# Patient Record
Sex: MALE | Race: WHITE | NOT HISPANIC OR LATINO | Employment: OTHER | URBAN - METROPOLITAN AREA
[De-identification: names, ages, dates, MRNs, and addresses within clinical notes are randomized per-mention and may not be internally consistent; named-entity substitution may affect disease eponyms.]

---

## 2019-03-28 ENCOUNTER — TRANSFERRED RECORDS (OUTPATIENT)
Dept: HEALTH INFORMATION MANAGEMENT | Facility: CLINIC | Age: 64
End: 2019-03-28

## 2019-04-16 ENCOUNTER — TRANSFERRED RECORDS (OUTPATIENT)
Dept: HEALTH INFORMATION MANAGEMENT | Facility: CLINIC | Age: 64
End: 2019-04-16

## 2019-04-29 ENCOUNTER — TRANSFERRED RECORDS (OUTPATIENT)
Dept: HEALTH INFORMATION MANAGEMENT | Facility: CLINIC | Age: 64
End: 2019-04-29

## 2019-06-03 ENCOUNTER — MEDICAL CORRESPONDENCE (OUTPATIENT)
Dept: HEALTH INFORMATION MANAGEMENT | Facility: CLINIC | Age: 64
End: 2019-06-03

## 2019-06-03 ENCOUNTER — TRANSFERRED RECORDS (OUTPATIENT)
Dept: HEALTH INFORMATION MANAGEMENT | Facility: CLINIC | Age: 64
End: 2019-06-03

## 2019-06-17 ENCOUNTER — OFFICE VISIT (OUTPATIENT)
Dept: NEUROSURGERY | Facility: CLINIC | Age: 64
End: 2019-06-17
Payer: COMMERCIAL

## 2019-06-17 VITALS
HEART RATE: 76 BPM | SYSTOLIC BLOOD PRESSURE: 159 MMHG | DIASTOLIC BLOOD PRESSURE: 102 MMHG | OXYGEN SATURATION: 96 % | HEIGHT: 71 IN | BODY MASS INDEX: 26.8 KG/M2 | WEIGHT: 191.4 LBS

## 2019-06-17 DIAGNOSIS — Z01.818 PRE-OPERATIVE EXAM: Primary | ICD-10-CM

## 2019-06-17 DIAGNOSIS — D32.0 BENIGN NEOPLASM OF CEREBRAL MENINGES (H): ICD-10-CM

## 2019-06-17 RX ORDER — LATANOPROST 50 UG/ML
1 SOLUTION/ DROPS OPHTHALMIC AT BEDTIME
COMMUNITY
Start: 2018-10-16

## 2019-06-17 RX ORDER — BRINZOLAMIDE 10 MG/ML
1 SUSPENSION/ DROPS OPHTHALMIC EVERY MORNING
COMMUNITY
Start: 2018-10-16

## 2019-06-17 RX ORDER — RAMIPRIL 10 MG/1
10 CAPSULE ORAL
COMMUNITY
Start: 2016-04-14

## 2019-06-17 RX ORDER — TIMOLOL MALEATE 5 MG/ML
1 SOLUTION/ DROPS OPHTHALMIC EVERY MORNING
COMMUNITY
Start: 2018-05-30

## 2019-06-17 ASSESSMENT — ENCOUNTER SYMPTOMS
LOSS OF CONSCIOUSNESS: 0
SMELL DISTURBANCE: 0
STIFFNESS: 0
HEADACHES: 1
COUGH DISTURBING SLEEP: 0
SORE THROAT: 0
BACK PAIN: 0
DOUBLE VISION: 0
DYSPNEA ON EXERTION: 0
TROUBLE SWALLOWING: 0
WHEEZING: 0
NECK PAIN: 0
SHORTNESS OF BREATH: 1
MUSCLE CRAMPS: 0
NECK MASS: 0
EYE PAIN: 1
MUSCLE WEAKNESS: 0
JOINT SWELLING: 0
INSOMNIA: 0
EYE WATERING: 1
COUGH: 1
MYALGIAS: 0
EYE REDNESS: 0
SPUTUM PRODUCTION: 1
HEMOPTYSIS: 0
SINUS CONGESTION: 1
SINUS PAIN: 1
DEPRESSION: 1
EYE IRRITATION: 1
POSTURAL DYSPNEA: 1
HOARSE VOICE: 0
ARTHRALGIAS: 1
TASTE DISTURBANCE: 0
DISTURBANCES IN COORDINATION: 0
SNORES LOUDLY: 1
NERVOUS/ANXIOUS: 1
MEMORY LOSS: 0
DIZZINESS: 0

## 2019-06-17 ASSESSMENT — MIFFLIN-ST. JEOR: SCORE: 1675.31

## 2019-06-17 ASSESSMENT — PAIN SCALES - GENERAL: PAINLEVEL: MILD PAIN (3)

## 2019-06-17 NOTE — LETTER
6/17/2019       RE: Vinh Mccarty  [address]  [facility] [address]     Dear Colleague,    Thank you for referring your patient, Vinh Mccarty, to the [facility] NEUROSURGERY at [facility]. Please see a copy of my visit note below.    See dictated note.    Again, thank you for allowing me to participate in the care of your patient.      Sincerely,    Matthew Fox MD

## 2019-06-17 NOTE — LETTER
6/17/2019      RE: Vinh Mccarty  [address]  [place] [address]       See dictated note.    Matthew Fox MD

## 2019-06-17 NOTE — LETTER
6/17/2019      RE: Vinh Mccarty  [address]  [place] [address]       See dictated note.    Matthew Fox MD

## 2019-06-17 NOTE — LETTER
6/17/2019       RE: Vinh Mccarty  [address]  [facility] [address]     Dear Colleague,    Thank you for referring your patient, Vinh Mccarty, to the [facility] NEUROSURGERY at [facility]. Please see a copy of my visit note below.    See dictated note.    Again, thank you for allowing me to participate in the care of your patient.      Sincerely,    Matthew Fox MD

## 2019-06-17 NOTE — LETTER
6/17/2019       RE: Vinh Mccarty  [address]  [facility] [address]     Dear Colleague,    Thank you for referring your patient, Vinh Mccarty, to the [facility] NEUROSURGERY at [facility]. Please see a copy of my visit note below.    See dictated note.    Again, thank you for allowing me to participate in the care of your patient.      Sincerely,    Matthew Fox MD

## 2019-06-17 NOTE — LETTER
6/17/2019       RE: Vinh Mccarty  [address]  [facility] [address]     Dear Colleague,    Thank you for referring your patient, Vinh Mccarty, to the [facility] NEUROSURGERY at [facility]. Please see a copy of my visit note below.    See dictated note.    Again, thank you for allowing me to participate in the care of your patient.      Sincerely,    Matthew Fox MD

## 2019-06-17 NOTE — LETTER
6/17/2019       RE: Vinh Mccarty  [address]  [facility] [address]     Dear Colleague,    Thank you for referring your patient, Vinh Mccarty, to the [facility] NEUROSURGERY at [facility]. Please see a copy of my visit note below.    See dictated note.    Again, thank you for allowing me to participate in the care of your patient.      Sincerely,    Matthew Fox MD

## 2019-06-17 NOTE — LETTER
6/17/2019       RE: Vinh Mccarty  [address]  [facility] [address]     Dear Colleague,    Thank you for referring your patient, Vinh Mccarty, to the [facility] NEUROSURGERY at [facility]. Please see a copy of my visit note below.    See dictated note.    Again, thank you for allowing me to participate in the care of your patient.      Sincerely,    Matthew Fox MD

## 2019-06-17 NOTE — LETTER
6/17/2019      RE: Vinh Mccarty  [address]  [place] [address]       See dictated note.    Matthew Fox MD

## 2019-06-17 NOTE — LETTER
6/17/2019       RE: Vinh Mccarty  [address]  [facility] [address]     Dear Colleague,    Thank you for referring your patient, Vinh Mccarty, to the [facility] NEUROSURGERY at [facility]. Please see a copy of my visit note below.    See dictated note.    Again, thank you for allowing me to participate in the care of your patient.      Sincerely,    Matthew Fox MD

## 2019-06-17 NOTE — LETTER
6/17/2019       RE: Vinh Mccarty  [address]  [facility] [address]     Dear Colleague,    Thank you for referring your patient, Vinh Mccarty, to the [facility] NEUROSURGERY at [facility]. Please see a copy of my visit note below.    See dictated note.    Again, thank you for allowing me to participate in the care of your patient.      Sincerely,    Matthew Fox MD

## 2019-06-17 NOTE — LETTER
6/17/2019       RE: Vinh Mccarty  [address]  [facility] [address]     Dear Colleague,    Thank you for referring your patient, Vinh Mccarty, to the [facility] NEUROSURGERY at [facility]. Please see a copy of my visit note below.    See dictated note.    Again, thank you for allowing me to participate in the care of your patient.      Sincerely,    Matthew Fox MD

## 2019-06-17 NOTE — NURSING NOTE
Chief Complaint   Patient presents with     New Patient     [facility] NEW MENINGIOMA       Agustina Leal, EMT

## 2019-06-17 NOTE — LETTER
6/17/2019       RE: Vinh Mccarty  [address]  [facility] [address]     Dear Colleague,    Thank you for referring your patient, Vinh Mccarty, to the [facility] NEUROSURGERY at [facility]. Please see a copy of my visit note below.    See dictated note.    Again, thank you for allowing me to participate in the care of your patient.      Sincerely,    Matthew Fox MD

## 2019-06-17 NOTE — LETTER
6/17/2019       RE: Vinh Mccarty  [address]  [facility] [address]     Dear Colleague,    Thank you for referring your patient, Vinh Mccarty, to the [facility] NEUROSURGERY at [facility]. Please see a copy of my visit note below.    See dictated note.    Again, thank you for allowing me to participate in the care of your patient.      Sincerely,    Matthew Fox MD

## 2019-06-17 NOTE — LETTER
6/17/2019       RE: Vinh Mccarty  [address]  [facility] [address]     Dear Colleague,    Thank you for referring your patient, Vinh Mccarty, to the [facility] NEUROSURGERY at [facility]. Please see a copy of my visit note below.    See dictated note.    Again, thank you for allowing me to participate in the care of your patient.      Sincerely,    Matthew Fox MD

## 2019-06-17 NOTE — LETTER
6/17/2019      RE: Vinh Mccarty  [address]  [place] [address]       See dictated note.    Matthew Fox MD

## 2019-06-17 NOTE — LETTER
6/17/2019       RE: Vinh Mccarty  [address]  [facility] [address]     Dear Colleague,    Thank you for referring your patient, Vinh Mccarty, to the [facility] NEUROSURGERY at [facility]. Please see a copy of my visit note below.    See dictated note.    Again, thank you for allowing me to participate in the care of your patient.      Sincerely,    Matthew Fox MD

## 2019-06-17 NOTE — LETTER
6/17/2019       RE: Vinh Mccarty  [address]  [facility] [address]     Dear Colleague,    Thank you for referring your patient, Vinh Mccarty, to the [facility] NEUROSURGERY at [facility]. Please see a copy of my visit note below.    See dictated note.    Again, thank you for allowing me to participate in the care of your patient.      Sincerely,    Matthew Fox MD

## 2019-06-17 NOTE — LETTER
6/17/2019      RE: Vinh Mccarty  [address]  [place] [address]       See dictated note.    Matthew Fox MD

## 2019-06-17 NOTE — LETTER
6/17/2019      RE: Vinh Mccarty  [address]  [place] [address]       See dictated note.    Matthew Fox MD

## 2019-06-17 NOTE — LETTER
6/17/2019      RE: Vinh Mccarty  [address]  [place] [address]       See dictated note.    Matthew Fox MD

## 2019-06-17 NOTE — LETTER
6/17/2019       RE: Vinh Mccarty  [address]  [facility] [address]     Dear Colleague,    Thank you for referring your patient, Vinh Mccarty, to the [facility] NEUROSURGERY at [facility]. Please see a copy of my visit note below.    See dictated note.    Again, thank you for allowing me to participate in the care of your patient.      Sincerely,    Matthew Fox MD

## 2019-06-17 NOTE — LETTER
6/17/2019       RE: Vinh Mccarty  [address]  [facility] [address]     Dear Colleague,    Thank you for referring your patient, Vinh Mccarty, to the [facility] NEUROSURGERY at [facility]. Please see a copy of my visit note below.    See dictated note.    Again, thank you for allowing me to participate in the care of your patient.      Sincerely,    Matthew Fox MD

## 2019-06-17 NOTE — LETTER
6/17/2019       RE: Vinh Mccarty  [address]  [facility] [address]     Dear Colleague,    Thank you for referring your patient, Vinh Mccarty, to the [facility] NEUROSURGERY at [facility]. Please see a copy of my visit note below.    See dictated note.    Again, thank you for allowing me to participate in the care of your patient.      Sincerely,    Matthew Fox MD

## 2019-06-17 NOTE — LETTER
6/17/2019       RE: Vinh Mccarty  [address]  [facility] [address]     Dear Colleague,    Thank you for referring your patient, Vinh Mccarty, to the [facility] NEUROSURGERY at [facility]. Please see a copy of my visit note below.    See dictated note.    Again, thank you for allowing me to participate in the care of your patient.      Sincerely,    Matthew Fox MD

## 2019-06-17 NOTE — LETTER
6/17/2019       RE: Vinh Mccarty  [address]  [facility] [address]     Dear Colleague,    Thank you for referring your patient, Vinh Mccarty, to the [facility] NEUROSURGERY at [facility]. Please see a copy of my visit note below.    See dictated note.    Again, thank you for allowing me to participate in the care of your patient.      Sincerely,    Matthew Fox MD

## 2019-06-17 NOTE — LETTER
6/17/2019      RE: Vinh Mccarty  [address]  [place] [address]       See dictated note.    Matthew Fox MD

## 2019-06-19 DIAGNOSIS — D32.0 BENIGN NEOPLASM OF CEREBRAL MENINGES (H): Primary | ICD-10-CM

## 2019-07-16 NOTE — TELEPHONE ENCOUNTER
Action 7/16/2019 2:28pm  PP   Action Taken Faxed request for EKG to [facility].        FUTURE VISIT INFORMATION      SURGERY INFORMATION:    Date: 7/25/2019    Location: [facility] OR    Surgeon:  Dr. Matthew Fox    Anesthesia Type:  General    RECORDS REQUESTED FROM:       Primary Care Provider:  Shanita Gan NP, [facility] [place]    Pertinent Medical History:  Hypertension, ERNESTINA    [facility]    Most recent Sleep Study:  6/10/2012 (Scan)

## 2019-07-17 ENCOUNTER — PATIENT OUTREACH (OUTPATIENT)
Dept: NEUROSURGERY | Facility: CLINIC | Age: 64
End: 2019-07-17

## 2019-07-17 NOTE — PROGRESS NOTES
Writer called pt to remind him not to take his ASA tomorrow morning and not to take it anymore prior to surgery.  Pt expressed understanding of the information given.

## 2019-07-18 ENCOUNTER — PATIENT OUTREACH (OUTPATIENT)
Dept: NEUROSURGERY | Facility: CLINIC | Age: 64
End: 2019-07-18

## 2019-07-18 NOTE — PROGRESS NOTES
Pre-op Teaching    Procedure:  (s) - multi select Right frontotemporal craniotomy and resection of tumor       Planned Surgery Date:7/24  Surgeon:Dr. Fox                Discussed pre-op routine and requirements to include:  surgical procedure, post-op recovery and expectations, need for H&P, NPO prior to OR, pre-op antibacterial showers, pain control and importance of follow-up visits.  Surgery scheduling will coordinate OR time/date and update patient as appropriate.  3C will call to re-inforce instructions 24-48 hours prior to surgery.       Ample time was provided for patient questions and in-depth discussion of topics of heightened interest.  Reviewed medication list and provided instructions regarding what medications to stop prior to surgery: ASA 81mg.   Anti-bacterial soap solution for pre-op showers will be provided at PAC visit as well as specific instructions for use. Approximately 20 minutes spent with patient/family discussing and reviewing.      Patient provided triage contact number for questions or concerns that may arise prior to surgery. Pre-op folder with specific written instructions given to patient for review.

## 2019-07-24 ENCOUNTER — PRE VISIT (OUTPATIENT)
Dept: SURGERY | Facility: CLINIC | Age: 64
End: 2019-07-24

## 2019-07-24 ENCOUNTER — OFFICE VISIT (OUTPATIENT)
Dept: SURGERY | Facility: CLINIC | Age: 64
End: 2019-07-24
Payer: COMMERCIAL

## 2019-07-24 ENCOUNTER — ANESTHESIA EVENT (OUTPATIENT)
Dept: SURGERY | Facility: CLINIC | Age: 64
DRG: 027 | End: 2019-07-24
Payer: MEDICARE

## 2019-07-24 VITALS
HEART RATE: 74 BPM | BODY MASS INDEX: 25.48 KG/M2 | TEMPERATURE: 98 F | DIASTOLIC BLOOD PRESSURE: 86 MMHG | WEIGHT: 182 LBS | RESPIRATION RATE: 16 BRPM | HEIGHT: 71 IN | SYSTOLIC BLOOD PRESSURE: 142 MMHG | OXYGEN SATURATION: 96 %

## 2019-07-24 DIAGNOSIS — D32.0 BENIGN NEOPLASM OF CEREBRAL MENINGES (H): ICD-10-CM

## 2019-07-24 DIAGNOSIS — Z01.818 PREOP EXAMINATION: Primary | ICD-10-CM

## 2019-07-24 DIAGNOSIS — Z01.818 PREOP EXAMINATION: ICD-10-CM

## 2019-07-24 LAB
ABO + RH BLD: NORMAL
ABO + RH BLD: NORMAL
ANION GAP SERPL CALCULATED.3IONS-SCNC: 4 MMOL/L (ref 3–14)
BLD GP AB SCN SERPL QL: NORMAL
BLOOD BANK CMNT PATIENT-IMP: NORMAL
BUN SERPL-MCNC: 12 MG/DL (ref 7–30)
CALCIUM SERPL-MCNC: 8.8 MG/DL (ref 8.5–10.1)
CHLORIDE SERPL-SCNC: 111 MMOL/L (ref 94–109)
CO2 SERPL-SCNC: 26 MMOL/L (ref 20–32)
CREAT SERPL-MCNC: 0.98 MG/DL (ref 0.66–1.25)
ERYTHROCYTE [DISTWIDTH] IN BLOOD BY AUTOMATED COUNT: 12 % (ref 10–15)
GFR SERPL CREATININE-BSD FRML MDRD: 81 ML/MIN/{1.73_M2}
GLUCOSE SERPL-MCNC: 101 MG/DL (ref 70–99)
HCT VFR BLD AUTO: 46 % (ref 40–53)
HGB BLD-MCNC: 15.1 G/DL (ref 13.3–17.7)
MCH RBC QN AUTO: 30.8 PG (ref 26.5–33)
MCHC RBC AUTO-ENTMCNC: 32.8 G/DL (ref 31.5–36.5)
MCV RBC AUTO: 94 FL (ref 78–100)
PLATELET # BLD AUTO: 185 10E9/L (ref 150–450)
POTASSIUM SERPL-SCNC: 3.6 MMOL/L (ref 3.4–5.3)
RBC # BLD AUTO: 4.9 10E12/L (ref 4.4–5.9)
SODIUM SERPL-SCNC: 142 MMOL/L (ref 133–144)
SPECIMEN EXP DATE BLD: NORMAL
WBC # BLD AUTO: 7.3 10E9/L (ref 4–11)

## 2019-07-24 ASSESSMENT — MIFFLIN-ST. JEOR: SCORE: 1632.68

## 2019-07-24 ASSESSMENT — PAIN SCALES - GENERAL: PAINLEVEL: MILD PAIN (2)

## 2019-07-24 NOTE — ANESTHESIA PREPROCEDURE EVALUATION
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 Pt has had prior anesthetic. Type: MAC, General and Regional           ROS/MED HX    ENT/Pulmonary: Comment: \"borderline\" ERNESTINA per pt    (+)sleep apnea, allergic rhinitis, doesn't use CPAP , . .    Neurologic: Comment: Benign neoplasm of cerebral meninges, affecting optic nerve, right visual changes      Cardiovascular:     (+) hypertension-range: on ramipril, ---. Taking blood thinners Pt has received instructions: Instructions Given to patient: stopped ASA 19. . . :. . No previous cardiac testing       METS/Exercise Tolerance: Comment: Walks 1-2 miles 5 days/week >4 METS   Hematologic:  - neg hematologic  ROS       Musculoskeletal: Comment: knees  (+) arthritis,  -       GI/Hepatic: Comment: Was taking famotidine for GERD, symptoms resolved w/ wt loss        Renal/Genitourinary:  - ROS Renal section negative       Endo:  - neg endo ROS       Psychiatric:  - neg psychiatric ROS       Infectious Disease:  - neg infectious disease ROS       Malignancy:      - no malignancy   Other:    (+) No chance of pregnancy C-spine cleared: N/A, no H/O Chronic Pain,                       PHYSICAL EXAM:   Mental Status/Neuro: A/A/O; Age Appropriate   Airway: Facies: Feasible  Mallampati: Not Assessed  Mouth/Opening: Not " "Assessed  TM distance: Not Assessed  Neck ROM: Not Assessed   Respiratory: Auscultation: CTAB     Resp. Rate: Normal     Resp. Effort: Normal      CV: Rhythm: Regular  Rate: Age appropriate  Heart: Normal Sounds   Comments:      Dental: Details                Lab Results   Component Value Date     05/05/2014    POTASSIUM 4.2 05/05/2014    CHLORIDE 105 05/05/2014    CO2 27 05/05/2014    BUN 11 05/05/2014    CR 0.93 05/05/2014    GLC 90 05/05/2014    BRAD 9.4 05/05/2014    ALT 34 07/26/2013    TSH 0.64 05/05/2014       Preop Vitals  BP Readings from Last 3 Encounters:   07/24/19 142/86   06/17/19 (!) 159/102   05/05/14 116/80    Pulse Readings from Last 3 Encounters:   07/24/19 74   06/17/19 76   05/05/14 68      Resp Readings from Last 3 Encounters:   07/24/19 16   05/05/14 12   04/08/14 12    SpO2 Readings from Last 3 Encounters:   07/24/19 96%   06/17/19 96%      Temp Readings from Last 1 Encounters:   07/24/19 98  F (36.7  C) (Oral)    Ht Readings from Last 1 Encounters:   07/24/19 1.803 m (5' 11\")      Wt Readings from Last 1 Encounters:   07/24/19 82.6 kg (182 lb)    Estimated body mass index is 25.38 kg/m  as calculated from the following:    Height as of this encounter: 1.803 m (5' 11\").    Weight as of this encounter: 82.6 kg (182 lb).     LDA:            Assessment:   ASA SCORE: 2    H&P: History and physical reviewed and following examination; no interval change.   Smoking Status:  Non-Smoker/Unknown   NPO Status: NPO Appropriate     Plan:   Anes. Type:  General   Pre-Medication: Midazolam   Induction:  IV (Standard)   Airway: ETT; Oral   Access/Monitoring: PIV; 2nd PIV; A-Line   Maintenance: Balanced     Postop Plan:   Postop Pain: Opioids  Postop Sedation/Airway: Not planned  Disposition: Inpatient/Admit     PONV Management:   Adult Risk Factors:, Non-Smoker, Postop Opioids   Prevention: Ondansetron, Dexamethasone     CONSENT: Direct conversation   Plan and risks discussed with: Patient; Spouse "   Blood Products: Consented (ALL Blood Products)                  PAC Discussion and Assessment    ASA Classification: 2  Case is suitable for: [place]  Anesthetic techniques and relevant risks discussed: GA  Invasive monitoring and risk discussed: No  Types:   Possibility and Risk of blood transfusion discussed: No  NPO instructions given:   Additional anesthetic preparation and risks discussed:   Needs early admission to pre-op area:   Other:     PAC Resident/NP Anesthesia Assessment:  Vinh Mccarty is a 64 year old male scheduled to undergo Right Frontotemporal Craniotomy And Resection Of Tumor with Matthew Fox MD on 7/25/19 at [facility] for treatment of Benign neoplasm of cerebral meninges.     He has the following specific operative considerations:      - Pt has had prior anesthetic. Type: MAC, General and Regional - no complications  - Anesthesia considerations:  Refer to PAC assessment in anesthesia records  - Risk of PONV score = 1.  If > 2, anti-emetic intervention recommended.    CARDIAC: METS >4, Walks 1-2 miles 5 days/week      - RCRI : Intermediate risk surgery,.  0.4% risk of major adverse cardiac event.    PULMONARY:    - ERNESTINA, no CPAP    - Never smoked, no asthma    GI: Hx GERD, resolved after wt loss    ENDO: BMI 26, no DM    HEME: VTE risk: 1.8%  - On ASA 81 mg, last dose on 7/17/19    ORTHO: full neck ROM, no TMJ       Patient was discussed with Dr Scales. Patient is optimized and is acceptable candidate for the proposed procedure, provided labs today are within acceptable range. No further diagnostic evaluation is needed.        Reviewed and Signed by PAC Mid-Level Provider/Resident  Mid-Level Provider/Resident: Thuy Kruse PA-C  Date: 7/24/19  Time: 1632    Attending Anesthesiologist Anesthesia Assessment:        Anesthesiologist:   Date:   Time:   Pass/Fail:   Disposition:     PAC Pharmacist Assessment:        Pharmacist:   Date:    Time:        Thuy Kruse PA-C

## 2019-07-24 NOTE — H&P
Pre-Operative H & P     CC:  Preoperative exam to assess for increased cardiopulmonary risk while undergoing surgery and anesthesia.    Date of Encounter: 7/24/2019  Primary Care Physician:  Shanita Gan  Reason for Visit: Benign neoplasm of cerebral meninges (H) [D32.0]    Vinh Mccarty is a 65 y/o male who presents for pre-operative H&P in preparation for Right Frontotemporal Craniotomy And Resection Of Tumor with Matthew Fox MD on 7/25/19 at [facility] for treatment of Benign neoplasm of cerebral meninges.    Mr. Mccarty was evaluated at [facility] for right sided visual loss, mainly in the periphery. MRI of his brain showed a small meningioma that is abutting the right optic nerve and internal carotid artery. It is likely that this is the cause for his visual decline. He now presents for the above procedure.    PMH is also significant for HTN (stable on Ramipril), HLD (taking crestor), ERNESTINA w/o CPAP use, GERD (resolved following wt loss). He is anticoagulated with ASA 81 mg (last dose 7/17/19 for upcoming surgery).    History was obtained from patient & chart review.     Past Medical History  Past Medical History:   Diagnosis Date     HTN (hypertension) 10/10/2011     hyperlipidemia 10/10/2011       Past Surgical History  Past Surgical History:   Procedure Laterality Date     ARTHROSCOPY KNEE  2011 LT; torn menicus knee     bakers cyst / removed  2011 LT     COLONOSCOPY  11/2011     ELBOW SURGERY      Removal of pin     GLAUCOMA SURGERY       surgically repaired/pins  1979    rt elbow fx       Hx of Blood transfusions/reactions: no     Hx of abnormal bleeding or anti-platelet use: on ASA 81 mg    Menstrual history: No LMP for male patient.: N/A    Steroid use in the last year: no    Personal or FH with difficulty with Anesthesia:  no    Prior to Admission Medications  Current Outpatient Medications   Medication Sig Dispense Refill      aspirin 81 MG tablet Take 1 tablet by mouth daily.       brinzolamide (AZOPT) 1 % ophthalmic suspension 1 drop       cefPROZIL (CEFZIL) 500 MG tablet Take 1 tablet (500 mg) by mouth 2 times daily (Patient not taking: Reported on 6/17/2019) 20 tablet 0     CRESTOR 10 MG tablet TAKE 1 TABLET (10 MG) BY MOUTH DAILY 90 tablet 0     famotidine (ACID REDUCER) 10 MG tablet Take 10 mg by mouth daily.       latanoprost (XALATAN) 0.005 % ophthalmic solution Inject 1 drop into the eye       ramipril (ALTACE) 10 MG capsule Take 10 mg by mouth       ramipril (ALTACE) 5 MG capsule TAKE 2 CAPSULES BY MOUTH DAILY. (Patient not taking: Reported on 6/17/2019) 180 capsule 0     sodium chloride (OCEAN NASAL SPRAY) 0.65 % nasal spray Spray 1 spray in nostril daily as needed.       timolol maleate (TIMOPTIC) 0.5 % ophthalmic solution 1 drop         Allergies  Allergies   Allergen Reactions     Cats      congestion     Horse Allergy      Per allergy testing     Penicillins        Social History  Social History     Socioeconomic History     Marital status:      Spouse name: Not on file     Number of children: Not on file     Years of education: Not on file     Highest education level: Not on file   Occupational History     Not on file   Social Needs     Financial resource strain: Not on file     Food insecurity:     Worry: Not on file     Inability: Not on file     Transportation needs:     Medical: Not on file     Non-medical: Not on file   Tobacco Use     Smoking status: Never Smoker     Smokeless tobacco: Never Used   Substance and Sexual Activity     Alcohol use: Yes     Comment: socially     Drug use: No     Sexual activity: Not on file   Lifestyle     Physical activity:     Days per week: Not on file     Minutes per session: Not on file     Stress: Not on file   Relationships     Social connections:     Talks on phone: Not on file     Gets together: Not on file     Attends Latter day service: Not on file     Active member of  "club or organization: Not on file     Attends meetings of clubs or organizations: Not on file     Relationship status: Not on file     Intimate partner violence:     Fear of current or ex partner: Not on file     Emotionally abused: Not on file     Physically abused: Not on file     Forced sexual activity: Not on file   Other Topics Concern      Service Not Asked     Blood Transfusions Yes     Caffeine Concern Yes     Comment: coffee > 32 oz     Occupational Exposure Not Asked     Hobby Hazards Not Asked     Sleep Concern Not Asked     Stress Concern Not Asked     Weight Concern Not Asked     Special Diet Not Asked     Back Care Not Asked     Exercise Yes     Comment: walking     Bike Helmet Not Asked     Seat Belt Not Asked     Self-Exams Not Asked     Parent/sibling w/ CABG, MI or angioplasty before 65F 55M? No   Social History Narrative     Not on file       Family History  Family History   Problem Relation Age of Onset     Heart Disease Father         CHF     Diabetes Father      Other - See Comments Father 62        emphysema     Respiratory Father      Cerebrovascular Disease Mother 94     Other - See Comments Brother         emphysema     Asthma No family hx of      Anesthesia Reaction No family hx of      Deep Vein Thrombosis (DVT) No family hx of        ROS/MED HX  The complete review of systems is negative other than noted in the HPI or here.  Patient denies recent illness, fever and respiratory infection during past month.  Pt denies steroid use during past year.    ENT/Pulmonary: Comment: \"borderline\" ERNESTINA per pt    (+)sleep apnea, allergic rhinitis, doesn't use CPAP , . .    Neurologic: Comment: Benign neoplasm of cerebral meninges, affecting optic nerve, right visual changes      Cardiovascular:     (+) hypertension-range: on ramipril, ---. Taking blood thinners Pt has received instructions: Instructions Given to patient: stopped ASA 7/17/19. . . :. . No previous cardiac testing     " "  METS/Exercise Tolerance: Comment: Walks 1-2 miles 5 days/week >4 METS   Hematologic:  - neg hematologic  ROS       Musculoskeletal: Comment: knees  (+) arthritis,  -       GI/Hepatic: Comment: Was taking famotidine for GERD, symptoms resolved w/ wt loss        Renal/Genitourinary:  - ROS Renal section negative       Endo:  - neg endo ROS       Psychiatric:  - neg psychiatric ROS       Infectious Disease:  - neg infectious disease ROS       Malignancy:      - no malignancy   Other:    (+) No chance of pregnancy C-spine cleared: N/A, no H/O Chronic Pain,             PHYSICAL EXAM:   Mental Status/Neuro: A/A/O; Age Appropriate   Airway: Facies: Feasible  Mallampati: I  Mouth/Opening: Full  TM distance: > 6 cm  Neck ROM: Full   Respiratory: Auscultation: CTAB     Resp. Rate: Normal     Resp. Effort: Normal      CV: Rhythm: Regular  Rate: Age appropriate  Heart: Normal Sounds   Comments:      Dental: Normal                Preop Vitals  BP Readings from Last 3 Encounters:   07/24/19 142/86   06/17/19 (!) 159/102   05/05/14 116/80    Pulse Readings from Last 3 Encounters:   07/24/19 74   06/17/19 76   05/05/14 68      Resp Readings from Last 3 Encounters:   07/24/19 16   05/05/14 12   04/08/14 12    SpO2 Readings from Last 3 Encounters:   07/24/19 96%   06/17/19 96%      Temp Readings from Last 1 Encounters:   07/24/19 98  F (36.7  C) (Oral)    Ht Readings from Last 1 Encounters:   07/24/19 1.803 m (5' 11\")      Wt Readings from Last 1 Encounters:   07/24/19 82.6 kg (182 lb)    Estimated body mass index is 25.38 kg/m  as calculated from the following:    Height as of this encounter: 1.803 m (5' 11\").    Weight as of this encounter: 82.6 kg (182 lb).       Temp: 98  F (36.7  C) Temp src: Oral BP: 142/86 Pulse: 74   Resp: 16 SpO2: 96 %         182 lbs 0 oz  5' 11\"   Body mass index is 25.38 kg/m .    Physical Exam  Constitutional: Awake, alert, cooperative, no apparent distress, and appears stated age.  Eyes: Pupils equal, " round and reactive to light, extra ocular muscles intact, sclera clear, conjunctiva normal.  HENT: Normocephalic, oral pharynx with moist mucus membranes, good dentition. No goiter appreciated. No removable dental hardware.  Respiratory: Clear to auscultation bilaterally, no crackles or wheezing. No SOB when supine.  Cardiovascular: Regular rate and rhythm, normal S1 and S2, and no murmur noted.  Carotids +2, no bruits. No edema. Palpable pulses to radial, DP and PT arteries.   GI: Normal bowel sounds, soft, non-distended, non-tender, no masses palpated, no hepatomegaly.    Lymph/Hematologic: No cervical lymphadenopathy and no supraclavicular lymphadenopathy.  Genitourinary:  deferred  Skin: Warm and dry.  No rashes at anticipated surgical site.   Musculoskeletal: full ROM of neck. There is no redness, warmth, or swelling of the joints. Gross motor strength is normal.    Neurologic: Awake, alert, oriented to name, place and time. Cranial nerves II-XII are grossly intact. Gait is normal. Ambulates from chair to exam table, seats self, lies supine and sits back up w/o assistance.  Neuropsychiatric: Calm, cooperative. Normal affect. Pleasant. Answers questions appropriately, follows commands w/o difficulty.    PRIOR LABS/DIAGNOSTIC STUDIES:  All labs and imaging personally reviewed    MR BRAIN W WO CONTRAST 4/19/19 ([facility]):  IMPRESSION: Two masses at the skull base. Meningioma is most likely, but certainly a neuroma or schwannoma is in the differential. Consider neurosurgery consultation.    Labs today: (personally reviewed)  CBC, BMP, T&S    Outside records reviewed from: Care Everywhere    ASSESSMENT and PLAN  Vinh Mccarty is a 64 year old male scheduled to undergo Right Frontotemporal Craniotomy And Resection Of Tumor with Matthew Fox MD on 7/25/19 at [facility] for treatment of Benign neoplasm of cerebral meninges.     He has the following  specific operative considerations:      - Pt has had prior anesthetic. Type: MAC, General and Regional - no complications  - Anesthesia considerations:  Refer to PAC assessment in anesthesia records  - Risk of PONV score = 1.  If > 2, anti-emetic intervention recommended.    CARDIAC: METS >4, Walks 1-2 miles 5 days/week      - RCRI : Intermediate risk surgery,.  0.4% risk of major adverse cardiac event.    PULMONARY:    - ERNESTINA, no CPAP    - Never smoked, no asthma    GI: Hx GERD, resolved after wt loss    ENDO: BMI 26, no DM    HEME: VTE risk: 1.8%  - On ASA 81 mg, last dose on 7/17/19    ORTHO: full neck ROM, no TMJ       Patient was discussed with Dr Scales. Patient is optimized and is acceptable candidate for the proposed procedure, provided labs today are within acceptable range. No further diagnostic evaluation is needed.    Arrival time, NPO, shower and medication instructions provided by nursing staff today.  Preparing For Your Surgery handout given.    Thuy Kruse PA-C  Preoperative Assessment Center  [facility]  Clinic and Surgery Center  Phone: [phone]  Fax: [phone]

## 2019-07-24 NOTE — PATIENT INSTRUCTIONS
Preparing for Your Surgery      Name:  Vinh Mccarty   MRN:  [id number]   :  1955   Today's Date:  2019     Arriving for surgery:  Surgery date:  19  Arrival time:  05:30 am  Please come to:       [facility] [address]  [address] [place]  [address]    - ? parking is available in front of the hospital      -    Please proceed to [address] on the 3rd floor. [phone]?     - ?If you are in need of directions, wheelchair or escort please stop at the Information Desk in the lobby.  Inform the information person that you are here for surgery; a wheelchair and escort to [address] will be provided.?     What can I eat or drink?  -  You may have solid food or milk products until 8 hours prior to your surgery.  -  You may have water, apple juice or 7up/Sprite until 2 hours prior to your surgery.    Which medicines can I take?  Stop Aspirin, vitamins and supplements one week prior to surgery.  Hold Ibuprofen for 24 hours and/or Naproxen for 48 hours prior to surgery.   Please do not take altace (ramipril) the morning of surgery.  -  Please take these medications the day of surgery:  Tylenol if needed; take all other scheduled medications normally taken in the morning.    How do I prepare myself?  -  Take two showers: one the night before surgery; and one the morning of surgery.         Use Scrubcare or Hibiclens to wash from neck down.  You may use your own     shampoo and conditioner. No other hair products.   -  Do NOT use lotion, powder, deodorant, or antiperspirant the day of your surgery.  -  Do NOT wear any jewelry.  - Do not bring your own medications to the hospital, except for inhalers and eye   drops.  -  Bring your ID and insurance card.        Questions or Concerns:  -If you have questions or concerns regarding the day of surgery, please call [phone].   -For questions after surgery please call your surgeons office.

## 2019-07-25 ENCOUNTER — ANESTHESIA (OUTPATIENT)
Dept: SURGERY | Facility: CLINIC | Age: 64
DRG: 027 | End: 2019-07-25
Payer: MEDICARE

## 2019-07-25 ENCOUNTER — HOSPITAL ENCOUNTER (INPATIENT)
Facility: CLINIC | Age: 64
LOS: 2 days | Discharge: HOME | DRG: 027 | End: 2019-07-27
Attending: NEUROLOGICAL SURGERY | Admitting: NEUROLOGICAL SURGERY
Payer: MEDICARE

## 2019-07-25 DIAGNOSIS — Z98.890 S/P CRANIOTOMY: Primary | ICD-10-CM

## 2019-07-25 LAB
ANION GAP SERPL CALCULATED.3IONS-SCNC: 7 MMOL/L (ref 3–14)
BUN SERPL-MCNC: 7 MG/DL (ref 7–30)
CALCIUM SERPL-MCNC: 8.4 MG/DL (ref 8.5–10.1)
CHLORIDE SERPL-SCNC: 109 MMOL/L (ref 94–109)
CO2 SERPL-SCNC: 23 MMOL/L (ref 20–32)
CREAT SERPL-MCNC: 0.65 MG/DL (ref 0.66–1.25)
ERYTHROCYTE [DISTWIDTH] IN BLOOD BY AUTOMATED COUNT: 12 % (ref 10–15)
GFR SERPL CREATININE-BSD FRML MDRD: >90 ML/MIN/{1.73_M2}
GLUCOSE BLDC GLUCOMTR-MCNC: 133 MG/DL (ref 70–99)
GLUCOSE SERPL-MCNC: 184 MG/DL (ref 70–99)
HCT VFR BLD AUTO: 41.8 % (ref 40–53)
HGB BLD-MCNC: 14.1 G/DL (ref 13.3–17.7)
MAGNESIUM SERPL-MCNC: 1.6 MG/DL (ref 1.6–2.3)
MCH RBC QN AUTO: 30.6 PG (ref 26.5–33)
MCHC RBC AUTO-ENTMCNC: 33.7 G/DL (ref 31.5–36.5)
MCV RBC AUTO: 91 FL (ref 78–100)
PHOSPHATE SERPL-MCNC: 2.6 MG/DL (ref 2.5–4.5)
PLATELET # BLD AUTO: 168 10E9/L (ref 150–450)
POTASSIUM SERPL-SCNC: 3.8 MMOL/L (ref 3.4–5.3)
RBC # BLD AUTO: 4.61 10E12/L (ref 4.4–5.9)
SODIUM SERPL-SCNC: 139 MMOL/L (ref 133–144)
WBC # BLD AUTO: 6.8 10E9/L (ref 4–11)

## 2019-07-25 PROCEDURE — C1713 ANCHOR/SCREW BN/BN,TIS/BN: HCPCS | Performed by: NEUROLOGICAL SURGERY

## 2019-07-25 PROCEDURE — 25000125 ZZHC RX 250: Performed by: NEUROLOGICAL SURGERY

## 2019-07-25 PROCEDURE — 25000132 ZZH RX MED GY IP 250 OP 250 PS 637: Performed by: NURSE PRACTITIONER

## 2019-07-25 PROCEDURE — 27210794 ZZH OR GENERAL SUPPLY STERILE: Performed by: NEUROLOGICAL SURGERY

## 2019-07-25 PROCEDURE — 00B10ZZ EXCISION OF CEREBRAL MENINGES, OPEN APPROACH: ICD-10-PCS | Performed by: NEUROLOGICAL SURGERY

## 2019-07-25 PROCEDURE — 40000170 ZZH STATISTIC PRE-PROCEDURE ASSESSMENT II: Performed by: NEUROLOGICAL SURGERY

## 2019-07-25 PROCEDURE — 80048 BASIC METABOLIC PNL TOTAL CA: CPT | Performed by: NURSE PRACTITIONER

## 2019-07-25 PROCEDURE — 25800030 ZZH RX IP 258 OP 636: Performed by: NURSE PRACTITIONER

## 2019-07-25 PROCEDURE — 27210995 ZZH RX 272: Performed by: NEUROLOGICAL SURGERY

## 2019-07-25 PROCEDURE — 85027 COMPLETE CBC AUTOMATED: CPT | Performed by: NURSE PRACTITIONER

## 2019-07-25 PROCEDURE — 25800030 ZZH RX IP 258 OP 636: Performed by: NURSE ANESTHETIST, CERTIFIED REGISTERED

## 2019-07-25 PROCEDURE — 27810169 ZZH OR IMPLANT GENERAL: Performed by: NEUROLOGICAL SURGERY

## 2019-07-25 PROCEDURE — 71000015 ZZH RECOVERY PHASE 1 LEVEL 2 EA ADDTL HR: Performed by: NEUROLOGICAL SURGERY

## 2019-07-25 PROCEDURE — 25000128 H RX IP 250 OP 636: Performed by: NURSE PRACTITIONER

## 2019-07-25 PROCEDURE — 84100 ASSAY OF PHOSPHORUS: CPT | Performed by: NURSE PRACTITIONER

## 2019-07-25 PROCEDURE — 25000131 ZZH RX MED GY IP 250 OP 636 PS 637: Performed by: STUDENT IN AN ORGANIZED HEALTH CARE EDUCATION/TRAINING PROGRAM

## 2019-07-25 PROCEDURE — 25000128 H RX IP 250 OP 636: Performed by: NURSE ANESTHETIST, CERTIFIED REGISTERED

## 2019-07-25 PROCEDURE — 37000009 ZZH ANESTHESIA TECHNICAL FEE, EACH ADDTL 15 MIN: Performed by: NEUROLOGICAL SURGERY

## 2019-07-25 PROCEDURE — 25800030 ZZH RX IP 258 OP 636: Performed by: ANESTHESIOLOGY

## 2019-07-25 PROCEDURE — 25000125 ZZHC RX 250: Performed by: NURSE ANESTHETIST, CERTIFIED REGISTERED

## 2019-07-25 PROCEDURE — 40000014 ZZH STATISTIC ARTERIAL MONITORING DAILY

## 2019-07-25 PROCEDURE — 83735 ASSAY OF MAGNESIUM: CPT | Performed by: NURSE PRACTITIONER

## 2019-07-25 PROCEDURE — 40000275 ZZH STATISTIC RCP TIME EA 10 MIN

## 2019-07-25 PROCEDURE — 88307 TISSUE EXAM BY PATHOLOGIST: CPT | Performed by: NEUROLOGICAL SURGERY

## 2019-07-25 PROCEDURE — 37000008 ZZH ANESTHESIA TECHNICAL FEE, 1ST 30 MIN: Performed by: NEUROLOGICAL SURGERY

## 2019-07-25 PROCEDURE — 25000128 H RX IP 250 OP 636: Performed by: NEUROLOGICAL SURGERY

## 2019-07-25 PROCEDURE — 36000074 ZZH SURGERY LEVEL 6 1ST 30 MIN - UMMC: Performed by: NEUROLOGICAL SURGERY

## 2019-07-25 PROCEDURE — 25000566 ZZH SEVOFLURANE, EA 15 MIN: Performed by: NEUROLOGICAL SURGERY

## 2019-07-25 PROCEDURE — C1763 CONN TISS, NON-HUMAN: HCPCS | Performed by: NEUROLOGICAL SURGERY

## 2019-07-25 PROCEDURE — 36000076 ZZH SURGERY LEVEL 6 EA 15 ADDTL MIN - UMMC: Performed by: NEUROLOGICAL SURGERY

## 2019-07-25 PROCEDURE — 25000128 H RX IP 250 OP 636: Performed by: ANESTHESIOLOGY

## 2019-07-25 PROCEDURE — 25000125 ZZHC RX 250: Performed by: STUDENT IN AN ORGANIZED HEALTH CARE EDUCATION/TRAINING PROGRAM

## 2019-07-25 PROCEDURE — 00NG0ZZ RELEASE OPTIC NERVE, OPEN APPROACH: ICD-10-PCS | Performed by: NEUROLOGICAL SURGERY

## 2019-07-25 PROCEDURE — 00000146 ZZHCL STATISTIC GLUCOSE BY METER IP

## 2019-07-25 PROCEDURE — 25800030 ZZH RX IP 258 OP 636: Performed by: NEUROLOGICAL SURGERY

## 2019-07-25 PROCEDURE — 20000004 ZZH R&B ICU UMMC

## 2019-07-25 PROCEDURE — 71000014 ZZH RECOVERY PHASE 1 LEVEL 2 FIRST HR: Performed by: NEUROLOGICAL SURGERY

## 2019-07-25 DEVICE — GRAFT DURAGEN 3X3" ID330: Type: IMPLANTABLE DEVICE | Site: BRAIN | Status: FUNCTIONAL

## 2019-07-25 DEVICE — IMP BUR HOLE COVER 17MM LOW PROFILE TI 421.527: Type: IMPLANTABLE DEVICE | Site: BRAIN | Status: FUNCTIONAL

## 2019-07-25 DEVICE — IMP PLATE SYN STR DOG BONE LOW PROFILE 2H TI 421.502: Type: IMPLANTABLE DEVICE | Site: BRAIN | Status: FUNCTIONAL

## 2019-07-25 DEVICE — IMPLANTABLE DEVICE: Type: IMPLANTABLE DEVICE | Site: BRAIN | Status: FUNCTIONAL

## 2019-07-25 RX ORDER — DEXAMETHASONE SODIUM PHOSPHATE 4 MG/ML
INJECTION, SOLUTION INTRA-ARTICULAR; INTRALESIONAL; INTRAMUSCULAR; INTRAVENOUS; SOFT TISSUE PRN
Status: DISCONTINUED | OUTPATIENT
Start: 2019-07-25 | End: 2019-07-25

## 2019-07-25 RX ORDER — NALOXONE HYDROCHLORIDE 0.4 MG/ML
.1-.4 INJECTION, SOLUTION INTRAMUSCULAR; INTRAVENOUS; SUBCUTANEOUS
Status: DISCONTINUED | OUTPATIENT
Start: 2019-07-25 | End: 2019-07-27 | Stop reason: HOSPADM

## 2019-07-25 RX ORDER — DEXAMETHASONE 4 MG/1
4 TABLET ORAL EVERY 8 HOURS SCHEDULED
Status: CANCELLED | OUTPATIENT
Start: 2019-07-25 | End: 2019-07-26

## 2019-07-25 RX ORDER — FENTANYL CITRATE 50 UG/ML
INJECTION, SOLUTION INTRAMUSCULAR; INTRAVENOUS PRN
Status: DISCONTINUED | OUTPATIENT
Start: 2019-07-25 | End: 2019-07-25

## 2019-07-25 RX ORDER — PANTOPRAZOLE SODIUM 40 MG/1
40 TABLET, DELAYED RELEASE ORAL
Status: DISCONTINUED | OUTPATIENT
Start: 2019-07-26 | End: 2019-07-27 | Stop reason: HOSPADM

## 2019-07-25 RX ORDER — LIDOCAINE 40 MG/G
CREAM TOPICAL
Status: DISCONTINUED | OUTPATIENT
Start: 2019-07-25 | End: 2019-07-27 | Stop reason: HOSPADM

## 2019-07-25 RX ORDER — LABETALOL HYDROCHLORIDE 5 MG/ML
INJECTION, SOLUTION INTRAVENOUS PRN
Status: DISCONTINUED | OUTPATIENT
Start: 2019-07-25 | End: 2019-07-25

## 2019-07-25 RX ORDER — LIDOCAINE HYDROCHLORIDE 20 MG/ML
INJECTION, SOLUTION INFILTRATION; PERINEURAL PRN
Status: DISCONTINUED | OUTPATIENT
Start: 2019-07-25 | End: 2019-07-25

## 2019-07-25 RX ORDER — DEXAMETHASONE 1 MG
1 TABLET ORAL DAILY
Status: CANCELLED | OUTPATIENT
Start: 2019-07-30 | End: 2019-07-31

## 2019-07-25 RX ORDER — SODIUM CHLORIDE, SODIUM LACTATE, POTASSIUM CHLORIDE, CALCIUM CHLORIDE 600; 310; 30; 20 MG/100ML; MG/100ML; MG/100ML; MG/100ML
INJECTION, SOLUTION INTRAVENOUS CONTINUOUS
Status: DISCONTINUED | OUTPATIENT
Start: 2019-07-25 | End: 2019-07-25 | Stop reason: HOSPADM

## 2019-07-25 RX ORDER — DEXTROSE MONOHYDRATE 25 G/50ML
25-50 INJECTION, SOLUTION INTRAVENOUS
Status: CANCELLED | OUTPATIENT
Start: 2019-07-25

## 2019-07-25 RX ORDER — SODIUM CHLORIDE 9 MG/ML
INJECTION, SOLUTION INTRAVENOUS CONTINUOUS
Status: DISCONTINUED | OUTPATIENT
Start: 2019-07-25 | End: 2019-07-26

## 2019-07-25 RX ORDER — HYDROMORPHONE HYDROCHLORIDE 1 MG/ML
.3-.5 INJECTION, SOLUTION INTRAMUSCULAR; INTRAVENOUS; SUBCUTANEOUS
Status: DISCONTINUED | OUTPATIENT
Start: 2019-07-25 | End: 2019-07-27 | Stop reason: HOSPADM

## 2019-07-25 RX ORDER — ACETAMINOPHEN 325 MG/1
975 TABLET ORAL ONCE
Status: DISCONTINUED | OUTPATIENT
Start: 2019-07-25 | End: 2019-07-25 | Stop reason: HOSPADM

## 2019-07-25 RX ORDER — ACETAMINOPHEN 325 MG/1
975 TABLET ORAL EVERY 8 HOURS
Status: DISCONTINUED | OUTPATIENT
Start: 2019-07-25 | End: 2019-07-27 | Stop reason: HOSPADM

## 2019-07-25 RX ORDER — ONDANSETRON 2 MG/ML
4 INJECTION INTRAMUSCULAR; INTRAVENOUS EVERY 6 HOURS PRN
Status: DISCONTINUED | OUTPATIENT
Start: 2019-07-25 | End: 2019-07-27 | Stop reason: HOSPADM

## 2019-07-25 RX ORDER — POTASSIUM CHLORIDE 29.8 MG/ML
20 INJECTION INTRAVENOUS
Status: CANCELLED | OUTPATIENT
Start: 2019-07-25

## 2019-07-25 RX ORDER — DEXAMETHASONE 4 MG/1
4 TABLET ORAL EVERY 8 HOURS SCHEDULED
Status: COMPLETED | OUTPATIENT
Start: 2019-07-25 | End: 2019-07-26

## 2019-07-25 RX ORDER — PROCHLORPERAZINE MALEATE 5 MG
5 TABLET ORAL EVERY 6 HOURS PRN
Status: DISCONTINUED | OUTPATIENT
Start: 2019-07-25 | End: 2019-07-27 | Stop reason: HOSPADM

## 2019-07-25 RX ORDER — POLYETHYLENE GLYCOL 3350 17 G/17G
17 POWDER, FOR SOLUTION ORAL DAILY
Status: CANCELLED | OUTPATIENT
Start: 2019-07-26

## 2019-07-25 RX ORDER — ROSUVASTATIN CALCIUM 10 MG/1
10 TABLET, COATED ORAL DAILY
Status: CANCELLED | OUTPATIENT
Start: 2019-07-26

## 2019-07-25 RX ORDER — FAMOTIDINE 10 MG
10 TABLET ORAL DAILY
Status: CANCELLED | OUTPATIENT
Start: 2019-07-26

## 2019-07-25 RX ORDER — ACETAMINOPHEN 325 MG/1
650 TABLET ORAL EVERY 4 HOURS PRN
Status: DISCONTINUED | OUTPATIENT
Start: 2019-07-28 | End: 2019-07-27 | Stop reason: HOSPADM

## 2019-07-25 RX ORDER — DEXAMETHASONE 1.5 MG/1
3 TABLET ORAL EVERY 8 HOURS SCHEDULED
Status: CANCELLED | OUTPATIENT
Start: 2019-07-26 | End: 2019-07-27

## 2019-07-25 RX ORDER — RAMIPRIL 10 MG/1
10 CAPSULE ORAL DAILY
Status: CANCELLED | OUTPATIENT
Start: 2019-07-26

## 2019-07-25 RX ORDER — BUPIVACAINE HYDROCHLORIDE AND EPINEPHRINE 5; 5 MG/ML; UG/ML
INJECTION, SOLUTION PERINEURAL PRN
Status: DISCONTINUED | OUTPATIENT
Start: 2019-07-25 | End: 2019-07-25 | Stop reason: HOSPADM

## 2019-07-25 RX ORDER — DEXAMETHASONE 1 MG
2 TABLET ORAL EVERY 8 HOURS SCHEDULED
Status: DISCONTINUED | OUTPATIENT
Start: 2019-07-27 | End: 2019-07-27 | Stop reason: HOSPADM

## 2019-07-25 RX ORDER — NALOXONE HYDROCHLORIDE 0.4 MG/ML
.1-.4 INJECTION, SOLUTION INTRAMUSCULAR; INTRAVENOUS; SUBCUTANEOUS
Status: DISCONTINUED | OUTPATIENT
Start: 2019-07-25 | End: 2019-07-25

## 2019-07-25 RX ORDER — POTASSIUM CHLORIDE 750 MG/1
20-40 TABLET, EXTENDED RELEASE ORAL
Status: CANCELLED | OUTPATIENT
Start: 2019-07-25

## 2019-07-25 RX ORDER — POTASSIUM CHLORIDE 1.5 G/1.58G
20-40 POWDER, FOR SOLUTION ORAL
Status: CANCELLED | OUTPATIENT
Start: 2019-07-25

## 2019-07-25 RX ORDER — DEXAMETHASONE 1 MG
2 TABLET ORAL DAILY
Status: DISCONTINUED | OUTPATIENT
Start: 2019-07-30 | End: 2019-07-27 | Stop reason: HOSPADM

## 2019-07-25 RX ORDER — AMOXICILLIN 250 MG
1 CAPSULE ORAL 2 TIMES DAILY
Status: DISCONTINUED | OUTPATIENT
Start: 2019-07-25 | End: 2019-07-27 | Stop reason: HOSPADM

## 2019-07-25 RX ORDER — NICOTINE POLACRILEX 4 MG
15-30 LOZENGE BUCCAL
Status: CANCELLED | OUTPATIENT
Start: 2019-07-25

## 2019-07-25 RX ORDER — OXYCODONE HYDROCHLORIDE 5 MG/1
5-10 TABLET ORAL EVERY 4 HOURS PRN
Status: DISCONTINUED | OUTPATIENT
Start: 2019-07-25 | End: 2019-07-27 | Stop reason: HOSPADM

## 2019-07-25 RX ORDER — LIDOCAINE 40 MG/G
CREAM TOPICAL
Status: DISCONTINUED | OUTPATIENT
Start: 2019-07-25 | End: 2019-07-25 | Stop reason: HOSPADM

## 2019-07-25 RX ORDER — LABETALOL 20 MG/4 ML (5 MG/ML) INTRAVENOUS SYRINGE
10-40 EVERY 10 MIN PRN
Status: DISCONTINUED | OUTPATIENT
Start: 2019-07-25 | End: 2019-07-27 | Stop reason: HOSPADM

## 2019-07-25 RX ORDER — DEXAMETHASONE 2 MG/1
2 TABLET ORAL EVERY 8 HOURS SCHEDULED
Status: CANCELLED | OUTPATIENT
Start: 2019-07-27 | End: 2019-07-28

## 2019-07-25 RX ORDER — AMOXICILLIN 250 MG
2 CAPSULE ORAL 2 TIMES DAILY
Status: DISCONTINUED | OUTPATIENT
Start: 2019-07-25 | End: 2019-07-27 | Stop reason: HOSPADM

## 2019-07-25 RX ORDER — POTASSIUM CL/LIDO/0.9 % NACL 10MEQ/0.1L
10 INTRAVENOUS SOLUTION, PIGGYBACK (ML) INTRAVENOUS
Status: CANCELLED | OUTPATIENT
Start: 2019-07-25

## 2019-07-25 RX ORDER — DEXAMETHASONE 1 MG
3 TABLET ORAL EVERY 8 HOURS SCHEDULED
Status: COMPLETED | OUTPATIENT
Start: 2019-07-26 | End: 2019-07-27

## 2019-07-25 RX ORDER — METOCLOPRAMIDE 5 MG/1
5 TABLET ORAL EVERY 6 HOURS PRN
Status: DISCONTINUED | OUTPATIENT
Start: 2019-07-25 | End: 2019-07-27 | Stop reason: HOSPADM

## 2019-07-25 RX ORDER — ONDANSETRON 4 MG/1
4 TABLET, ORALLY DISINTEGRATING ORAL EVERY 6 HOURS PRN
Status: DISCONTINUED | OUTPATIENT
Start: 2019-07-25 | End: 2019-07-27 | Stop reason: HOSPADM

## 2019-07-25 RX ORDER — HYDRALAZINE HYDROCHLORIDE 20 MG/ML
10-20 INJECTION INTRAMUSCULAR; INTRAVENOUS EVERY 30 MIN PRN
Status: DISCONTINUED | OUTPATIENT
Start: 2019-07-25 | End: 2019-07-27 | Stop reason: HOSPADM

## 2019-07-25 RX ORDER — ONDANSETRON 4 MG/1
4 TABLET, ORALLY DISINTEGRATING ORAL EVERY 30 MIN PRN
Status: DISCONTINUED | OUTPATIENT
Start: 2019-07-25 | End: 2019-07-25 | Stop reason: HOSPADM

## 2019-07-25 RX ORDER — MANNITOL 20 G/100ML
INJECTION, SOLUTION INTRAVENOUS PRN
Status: DISCONTINUED | OUTPATIENT
Start: 2019-07-25 | End: 2019-07-25

## 2019-07-25 RX ORDER — ESMOLOL HYDROCHLORIDE 10 MG/ML
INJECTION INTRAVENOUS PRN
Status: DISCONTINUED | OUTPATIENT
Start: 2019-07-25 | End: 2019-07-25

## 2019-07-25 RX ORDER — CLINDAMYCIN PHOSPHATE 900 MG/50ML
900 INJECTION, SOLUTION INTRAVENOUS SEE ADMIN INSTRUCTIONS
Status: DISCONTINUED | OUTPATIENT
Start: 2019-07-25 | End: 2019-07-25 | Stop reason: HOSPADM

## 2019-07-25 RX ORDER — HYDROMORPHONE HYDROCHLORIDE 1 MG/ML
.3-.5 INJECTION, SOLUTION INTRAMUSCULAR; INTRAVENOUS; SUBCUTANEOUS EVERY 5 MIN PRN
Status: DISCONTINUED | OUTPATIENT
Start: 2019-07-25 | End: 2019-07-25 | Stop reason: HOSPADM

## 2019-07-25 RX ORDER — PROPOFOL 10 MG/ML
INJECTION, EMULSION INTRAVENOUS PRN
Status: DISCONTINUED | OUTPATIENT
Start: 2019-07-25 | End: 2019-07-25

## 2019-07-25 RX ORDER — METOCLOPRAMIDE HYDROCHLORIDE 5 MG/ML
5 INJECTION INTRAMUSCULAR; INTRAVENOUS EVERY 6 HOURS PRN
Status: DISCONTINUED | OUTPATIENT
Start: 2019-07-25 | End: 2019-07-27 | Stop reason: HOSPADM

## 2019-07-25 RX ORDER — ONDANSETRON 2 MG/ML
4 INJECTION INTRAMUSCULAR; INTRAVENOUS EVERY 30 MIN PRN
Status: DISCONTINUED | OUTPATIENT
Start: 2019-07-25 | End: 2019-07-25 | Stop reason: HOSPADM

## 2019-07-25 RX ORDER — CLINDAMYCIN PHOSPHATE 900 MG/50ML
900 INJECTION, SOLUTION INTRAVENOUS
Status: COMPLETED | OUTPATIENT
Start: 2019-07-25 | End: 2019-07-25

## 2019-07-25 RX ORDER — DEXAMETHASONE 1 MG
2 TABLET ORAL EVERY 12 HOURS SCHEDULED
Status: DISCONTINUED | OUTPATIENT
Start: 2019-07-28 | End: 2019-07-27 | Stop reason: HOSPADM

## 2019-07-25 RX ORDER — SODIUM CHLORIDE, SODIUM LACTATE, POTASSIUM CHLORIDE, CALCIUM CHLORIDE 600; 310; 30; 20 MG/100ML; MG/100ML; MG/100ML; MG/100ML
INJECTION, SOLUTION INTRAVENOUS CONTINUOUS PRN
Status: DISCONTINUED | OUTPATIENT
Start: 2019-07-25 | End: 2019-07-25

## 2019-07-25 RX ORDER — MAGNESIUM SULFATE HEPTAHYDRATE 40 MG/ML
4 INJECTION, SOLUTION INTRAVENOUS EVERY 4 HOURS PRN
Status: CANCELLED | OUTPATIENT
Start: 2019-07-25

## 2019-07-25 RX ORDER — ONDANSETRON 2 MG/ML
INJECTION INTRAMUSCULAR; INTRAVENOUS PRN
Status: DISCONTINUED | OUTPATIENT
Start: 2019-07-25 | End: 2019-07-25

## 2019-07-25 RX ORDER — POTASSIUM CHLORIDE 7.45 MG/ML
10 INJECTION INTRAVENOUS
Status: CANCELLED | OUTPATIENT
Start: 2019-07-25

## 2019-07-25 RX ORDER — FENTANYL CITRATE 50 UG/ML
25-50 INJECTION, SOLUTION INTRAMUSCULAR; INTRAVENOUS
Status: DISCONTINUED | OUTPATIENT
Start: 2019-07-25 | End: 2019-07-25 | Stop reason: HOSPADM

## 2019-07-25 RX ORDER — DEXAMETHASONE 1 MG
1 TABLET ORAL EVERY 8 HOURS SCHEDULED
Status: CANCELLED | OUTPATIENT
Start: 2019-07-28 | End: 2019-07-29

## 2019-07-25 RX ADMIN — FENTANYL CITRATE 50 MCG: 50 INJECTION, SOLUTION INTRAMUSCULAR; INTRAVENOUS at 13:44

## 2019-07-25 RX ADMIN — ROCURONIUM BROMIDE 70 MG: 10 INJECTION INTRAVENOUS at 07:51

## 2019-07-25 RX ADMIN — SENNOSIDES AND DOCUSATE SODIUM 1 TABLET: 8.6; 5 TABLET ORAL at 21:52

## 2019-07-25 RX ADMIN — ONDANSETRON 4 MG: 2 INJECTION INTRAMUSCULAR; INTRAVENOUS at 20:27

## 2019-07-25 RX ADMIN — PROPOFOL 90 MG: 10 INJECTION, EMULSION INTRAVENOUS at 08:11

## 2019-07-25 RX ADMIN — FENTANYL CITRATE 50 MCG: 50 INJECTION, SOLUTION INTRAMUSCULAR; INTRAVENOUS at 15:15

## 2019-07-25 RX ADMIN — SODIUM CHLORIDE, POTASSIUM CHLORIDE, SODIUM LACTATE AND CALCIUM CHLORIDE: 600; 310; 30; 20 INJECTION, SOLUTION INTRAVENOUS at 08:00

## 2019-07-25 RX ADMIN — CLINDAMYCIN PHOSPHATE 900 MG: 18 INJECTION, SOLUTION INTRAVENOUS at 08:24

## 2019-07-25 RX ADMIN — ONDANSETRON 4 MG: 2 INJECTION INTRAMUSCULAR; INTRAVENOUS at 15:52

## 2019-07-25 RX ADMIN — ROCURONIUM BROMIDE 30 MG: 10 INJECTION INTRAVENOUS at 12:47

## 2019-07-25 RX ADMIN — FENTANYL CITRATE 50 MCG: 50 INJECTION, SOLUTION INTRAMUSCULAR; INTRAVENOUS at 15:56

## 2019-07-25 RX ADMIN — ROCURONIUM BROMIDE 10 MG: 10 INJECTION INTRAVENOUS at 11:31

## 2019-07-25 RX ADMIN — FENTANYL CITRATE 25 MCG: 50 INJECTION INTRAMUSCULAR; INTRAVENOUS at 17:03

## 2019-07-25 RX ADMIN — DEXAMETHASONE SODIUM PHOSPHATE 10 MG: 4 INJECTION, SOLUTION INTRA-ARTICULAR; INTRALESIONAL; INTRAMUSCULAR; INTRAVENOUS; SOFT TISSUE at 08:30

## 2019-07-25 RX ADMIN — ESMOLOL HYDROCHLORIDE 50 MG: 10 INJECTION, SOLUTION INTRAVENOUS at 08:11

## 2019-07-25 RX ADMIN — LABETALOL HYDROCHLORIDE 10 MG: 5 INJECTION INTRAVENOUS at 16:18

## 2019-07-25 RX ADMIN — ROCURONIUM BROMIDE 10 MG: 10 INJECTION INTRAVENOUS at 10:12

## 2019-07-25 RX ADMIN — ONDANSETRON 4 MG: 2 INJECTION INTRAMUSCULAR; INTRAVENOUS at 08:30

## 2019-07-25 RX ADMIN — OXYCODONE HYDROCHLORIDE 10 MG: 5 TABLET ORAL at 20:21

## 2019-07-25 RX ADMIN — HYDROMORPHONE HYDROCHLORIDE 0.3 MG: 1 INJECTION, SOLUTION INTRAMUSCULAR; INTRAVENOUS; SUBCUTANEOUS at 18:18

## 2019-07-25 RX ADMIN — LABETALOL HYDROCHLORIDE 10 MG: 5 INJECTION INTRAVENOUS at 16:10

## 2019-07-25 RX ADMIN — LABETALOL HYDROCHLORIDE 10 MG: 5 INJECTION INTRAVENOUS at 16:04

## 2019-07-25 RX ADMIN — FENTANYL CITRATE 50 MCG: 50 INJECTION, SOLUTION INTRAMUSCULAR; INTRAVENOUS at 07:43

## 2019-07-25 RX ADMIN — FENTANYL CITRATE 50 MCG: 50 INJECTION, SOLUTION INTRAMUSCULAR; INTRAVENOUS at 07:51

## 2019-07-25 RX ADMIN — SUGAMMADEX 200 MG: 100 INJECTION, SOLUTION INTRAVENOUS at 16:02

## 2019-07-25 RX ADMIN — ROCURONIUM BROMIDE 20 MG: 10 INJECTION INTRAVENOUS at 15:15

## 2019-07-25 RX ADMIN — HYDRALAZINE HYDROCHLORIDE 20 MG: 20 INJECTION INTRAMUSCULAR; INTRAVENOUS at 20:26

## 2019-07-25 RX ADMIN — ROCURONIUM BROMIDE 10 MG: 10 INJECTION INTRAVENOUS at 11:49

## 2019-07-25 RX ADMIN — CLINDAMYCIN PHOSPHATE 900 MG: 18 INJECTION, SOLUTION INTRAVENOUS at 13:53

## 2019-07-25 RX ADMIN — FENTANYL CITRATE 50 MCG: 50 INJECTION, SOLUTION INTRAMUSCULAR; INTRAVENOUS at 08:51

## 2019-07-25 RX ADMIN — LABETALOL HYDROCHLORIDE 10 MG: 5 INJECTION INTRAVENOUS at 16:14

## 2019-07-25 RX ADMIN — PROCHLORPERAZINE EDISYLATE 5 MG: 5 INJECTION INTRAMUSCULAR; INTRAVENOUS at 16:49

## 2019-07-25 RX ADMIN — FENTANYL CITRATE 50 MCG: 50 INJECTION, SOLUTION INTRAMUSCULAR; INTRAVENOUS at 13:02

## 2019-07-25 RX ADMIN — PROPOFOL 200 MG: 10 INJECTION, EMULSION INTRAVENOUS at 07:51

## 2019-07-25 RX ADMIN — SODIUM CHLORIDE: 9 INJECTION, SOLUTION INTRAVENOUS at 17:04

## 2019-07-25 RX ADMIN — FENTANYL CITRATE 50 MCG: 50 INJECTION, SOLUTION INTRAMUSCULAR; INTRAVENOUS at 09:25

## 2019-07-25 RX ADMIN — HYDROMORPHONE HYDROCHLORIDE 0.3 MG: 1 INJECTION, SOLUTION INTRAMUSCULAR; INTRAVENOUS; SUBCUTANEOUS at 23:13

## 2019-07-25 RX ADMIN — FENTANYL CITRATE 50 MCG: 50 INJECTION, SOLUTION INTRAMUSCULAR; INTRAVENOUS at 08:09

## 2019-07-25 RX ADMIN — ROCURONIUM BROMIDE 20 MG: 10 INJECTION INTRAVENOUS at 14:13

## 2019-07-25 RX ADMIN — ROCURONIUM BROMIDE 10 MG: 10 INJECTION INTRAVENOUS at 10:49

## 2019-07-25 RX ADMIN — DEXAMETHASONE 4 MG: 4 TABLET ORAL at 21:52

## 2019-07-25 RX ADMIN — MANNITOL 41.5 G: 20 INJECTION, SOLUTION INTRAVENOUS at 08:15

## 2019-07-25 RX ADMIN — ROCURONIUM BROMIDE 10 MG: 10 INJECTION INTRAVENOUS at 13:37

## 2019-07-25 RX ADMIN — ROCURONIUM BROMIDE 10 MG: 10 INJECTION INTRAVENOUS at 09:25

## 2019-07-25 RX ADMIN — SODIUM CHLORIDE, POTASSIUM CHLORIDE, SODIUM LACTATE AND CALCIUM CHLORIDE: 600; 310; 30; 20 INJECTION, SOLUTION INTRAVENOUS at 12:38

## 2019-07-25 RX ADMIN — FENTANYL CITRATE 50 MCG: 50 INJECTION, SOLUTION INTRAMUSCULAR; INTRAVENOUS at 16:14

## 2019-07-25 RX ADMIN — LABETALOL HYDROCHLORIDE 10 MG: 5 INJECTION INTRAVENOUS at 15:59

## 2019-07-25 RX ADMIN — SODIUM CHLORIDE, POTASSIUM CHLORIDE, SODIUM LACTATE AND CALCIUM CHLORIDE: 600; 310; 30; 20 INJECTION, SOLUTION INTRAVENOUS at 07:35

## 2019-07-25 RX ADMIN — LIDOCAINE HYDROCHLORIDE 100 MG: 20 INJECTION, SOLUTION INFILTRATION; PERINEURAL at 07:51

## 2019-07-25 ASSESSMENT — PAIN DESCRIPTION - DESCRIPTORS
DESCRIPTORS: HEADACHE

## 2019-07-25 ASSESSMENT — MIFFLIN-ST. JEOR: SCORE: 1640.13

## 2019-07-25 ASSESSMENT — ACTIVITIES OF DAILY LIVING (ADL): ADLS_ACUITY_SCORE: 14

## 2019-07-25 NOTE — BRIEF OP NOTE
[facility], [place]    Brief Operative Note    Pre-operative diagnosis: Benign Neoplasm Of Cerebral Meninges  Post-operative diagnosis * No post-op diagnosis entered *  Procedure: Procedure(s):  Right Frontotemporal Craniotomy And Resection Of Tumor  Surgeon: Surgeon(s) and Role:     * Matthew Fox MD - Primary     * Leschke, John M, MD - Resident - Assisting     * Tacho Mathis MD - Resident - Assisting  Anesthesia: General   Estimated blood loss: 30cc  Drains: None  Specimens:   ID Type Source Tests Collected by Time Destination   A : skullbase tumor Tissue Brain SURGICAL PATHOLOGY EXAM Matthew Fox MD 7/25/2019 11:38 AM      Findings:   gross total resection of clinoidal meningioma.  Complications: None.  Implants:    Implant Name Type Inv. Item Serial No.  Lot No. LRB No. Used   GRAFT DURAGEN 3X3&quot; [id number] Bone/Tissue/Biologic GRAFT DURAGEN 3X3&quot; ID-[id number]  [employer] [id number] Right 1   IMP PLATE SYN STR DOG BONE LOW PROFILE 2H .502 Metallic Hardware/[place] IMP PLATE SYN STR DOG BONE LOW PROFILE 2H .502  SYNTHES-STRATEC NONE Right 1   IMP SCR SYN MATRIX LOW PROFILE 2.0X03MM EMERG [id number] Metallic Hardware/[place] IMP SCR SYN MATRIX LOW PROFILE 2.0X03MM EMERG [id number]  SYN[employer]ES-STRATEC NONE Right 11   IMP BUR HOLE COVER 17MM LOW PROFILE .527 Metallic Hardware/[place] IMP BUR HOLE COVER 17MM LOW PROFILE .527  [employer] NONE Right 2        To ICU overnight - pre-op exam with right eye temporal hemianopsia. Will monitor visual function post op given tumor adherence to the optic nerve.

## 2019-07-25 NOTE — ANESTHESIA CARE TRANSFER NOTE
Patient: Vinh Mccarty    Procedure(s):  Right Frontotemporal Craniotomy And Resection Of Tumor    Diagnosis: Benign Neoplasm Of Cerebral Meninges  Diagnosis Additional Information: No value filed.    Anesthesia Type:   General     Note:  Airway :Nasal Cannula  Patient transferred to:PACU  Comments: To PACU, VSS, airway patent, RN at bedside, patient awake and alert.Handoff Report: Identifed the Patient, Identified the Reponsible Provider, Reviewed the pertinent medical history, Discussed the surgical course, Reviewed Intra-OP anesthesia mangement and issues during anesthesia, Set expectations for post-procedure period and Allowed opportunity for questions and acknowledgement of understanding      Vitals: (Last set prior to Anesthesia Care Transfer)    CRNA VITALS  7/25/2019 1553 - 7/25/2019 1632      7/25/2019             Resp Rate (observed):  8                Electronically Signed By: RYLEE Ansari CRNA  July 25, 2019  4:32 PM

## 2019-07-25 NOTE — ANESTHESIA POSTPROCEDURE EVALUATION
Anesthesia POST Procedure Evaluation    Patient: Vinh Mccarty   MRN:     [id number] Gender:   male   Age:    64 year old :      1955        Preoperative Diagnosis: Benign Neoplasm Of Cerebral Meninges   Procedure(s):  Right Frontotemporal Craniotomy And Resection Of Tumor   Postop Comments: No value filed.       Anesthesia Type:  Not documented  General    Reportable Event: NO     PAIN: Uncomplicated   Sign Out status: Comfortable, Well controlled pain     PONV: No PONV   Sign Out status:  No Nausea or Vomiting     Neuro/Psych: Uneventful perioperative course   Sign Out Status: Preoperative baseline; Age appropriate mentation     Airway/Resp.: Uneventful perioperative course   Sign Out Status: Non labored breathing, age appropriate RR; Resp. Status within EXPECTED Parameters     CV: Uneventful perioperative course   Sign Out status: Appropriate BP and perfusion indices; Appropriate HR/Rhythm     Disposition:   Sign Out in:  ICU  Disposition:  ICU  Recovery Course: Recovery in ICU  Follow-Up: Not required           Last Anesthesia Record Vitals:  CRNA VITALS  2019 1549 - 2019 1649      2019             Pulse:  75    ART BP:  130/75          Last PACU Vitals:  Vitals Value Taken Time   /84 2019  4:50 PM   Temp     Pulse 74 2019  4:50 PM   Resp 10 2019  4:30 PM   SpO2 95 % 2019  5:00 PM   Temp src     NIBP     Pulse 75 2019  4:33 PM   SpO2     Resp     Temp     Ht Rate     Temp 2     Vitals shown include unvalidated device data.      Electronically Signed By: Courtney Tobar MD, 2019, 5:02 PM

## 2019-07-25 NOTE — PROGRESS NOTES
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 He understands risks of the surgery including death, stroke, blindness, failure to improve, CSF leak, infection, hemorrhage, need for reoperation and agrees to proceed.  SHAHLA Fox MD

## 2019-07-25 NOTE — OR NURSING
Dr. Mathis reviewed lab results drawn in PACU. Patient okay to transfer to . Dr. Mathis notified that patient has slight visual cut on right side that was also seen in pre-op. Patient feels it is getting better. Will continue to monitor.

## 2019-07-25 NOTE — ANESTHESIA PROCEDURE NOTES
Arterial Line Procedure Note  Staff:     Anesthesiologist:  Alberto Gonzalez MD  Location: In OR After Induction  Procedure Start/Stop Times:     patient identified, IV checked, site marked, risks and benefits discussed, informed consent, monitors and equipment checked, pre-op evaluation and at physician/surgeon's request      Correct Patient: Yes      Correct Position: Yes      Correct Site: Yes      Correct Procedure: Yes      Correct Laterality:  Yes    Site Marked:  Yes  Line Placement:     Procedure:  Arterial Line    Insertion Site:  Radial    Insertion laterality:  Left    Skin Prep: Chloraprep      Patient Prep: mask, sterile gloves, hat and hand hygiene      Local skin infiltration:  None    Ultrasound Guided?: No      Catheter size:  20 gauge, 12 cm    Cath secured with: suture      Dressing:  Tegaderm    Complications:  None obvious    Arterial waveform: Yes      IBP within 10% of NIBP: Yes

## 2019-07-25 NOTE — OR NURSING
Dr. Leschke at bedside in PACU. Performed neuro exam, intact, except slightly disoriented on situation, but is improving. Confirmed no head CT needed prior to transfer to .

## 2019-07-26 ENCOUNTER — APPOINTMENT (OUTPATIENT)
Dept: PHYSICAL THERAPY | Facility: CLINIC | Age: 64
DRG: 027 | End: 2019-07-26
Attending: NURSE PRACTITIONER
Payer: MEDICARE

## 2019-07-26 LAB
GLUCOSE BLDC GLUCOMTR-MCNC: 169 MG/DL (ref 70–99)
INTERPRETATION ECG - MUSE: NORMAL

## 2019-07-26 PROCEDURE — 97530 THERAPEUTIC ACTIVITIES: CPT | Mod: GP | Performed by: STUDENT IN AN ORGANIZED HEALTH CARE EDUCATION/TRAINING PROGRAM

## 2019-07-26 PROCEDURE — 00000146 ZZHCL STATISTIC GLUCOSE BY METER IP

## 2019-07-26 PROCEDURE — 25000131 ZZH RX MED GY IP 250 OP 636 PS 637: Performed by: STUDENT IN AN ORGANIZED HEALTH CARE EDUCATION/TRAINING PROGRAM

## 2019-07-26 PROCEDURE — 97161 PT EVAL LOW COMPLEX 20 MIN: CPT | Mod: GP | Performed by: STUDENT IN AN ORGANIZED HEALTH CARE EDUCATION/TRAINING PROGRAM

## 2019-07-26 PROCEDURE — 93010 ELECTROCARDIOGRAM REPORT: CPT | Performed by: INTERNAL MEDICINE

## 2019-07-26 PROCEDURE — 25800030 ZZH RX IP 258 OP 636: Performed by: NURSE PRACTITIONER

## 2019-07-26 PROCEDURE — 93005 ELECTROCARDIOGRAM TRACING: CPT

## 2019-07-26 PROCEDURE — 12000001 ZZH R&B MED SURG/OB UMMC

## 2019-07-26 PROCEDURE — 97116 GAIT TRAINING THERAPY: CPT | Mod: GP | Performed by: STUDENT IN AN ORGANIZED HEALTH CARE EDUCATION/TRAINING PROGRAM

## 2019-07-26 PROCEDURE — 25000125 ZZHC RX 250: Performed by: NEUROLOGICAL SURGERY

## 2019-07-26 PROCEDURE — 25000132 ZZH RX MED GY IP 250 OP 250 PS 637: Performed by: NURSE PRACTITIONER

## 2019-07-26 RX ORDER — TIMOLOL MALEATE 5 MG/ML
1 SOLUTION/ DROPS OPHTHALMIC DAILY
Status: DISCONTINUED | OUTPATIENT
Start: 2019-07-26 | End: 2019-07-27 | Stop reason: HOSPADM

## 2019-07-26 RX ORDER — TIMOLOL MALEATE 5 MG/ML
1 SOLUTION/ DROPS OPHTHALMIC DAILY
Status: DISCONTINUED | OUTPATIENT
Start: 2019-07-26 | End: 2019-07-26

## 2019-07-26 RX ORDER — LATANOPROST 50 UG/ML
1 SOLUTION/ DROPS OPHTHALMIC DAILY
Status: DISCONTINUED | OUTPATIENT
Start: 2019-07-26 | End: 2019-07-26

## 2019-07-26 RX ORDER — LATANOPROST 50 UG/ML
1 SOLUTION/ DROPS OPHTHALMIC AT BEDTIME
Status: DISCONTINUED | OUTPATIENT
Start: 2019-07-27 | End: 2019-07-27 | Stop reason: HOSPADM

## 2019-07-26 RX ORDER — BRINZOLAMIDE 10 MG/ML
1 SUSPENSION/ DROPS OPHTHALMIC 3 TIMES DAILY
Status: DISCONTINUED | OUTPATIENT
Start: 2019-07-26 | End: 2019-07-26

## 2019-07-26 RX ORDER — BRINZOLAMIDE 10 MG/ML
1 SUSPENSION/ DROPS OPHTHALMIC EVERY MORNING
Status: DISCONTINUED | OUTPATIENT
Start: 2019-07-26 | End: 2019-07-27 | Stop reason: HOSPADM

## 2019-07-26 RX ADMIN — DEXAMETHASONE 4 MG: 4 TABLET ORAL at 13:27

## 2019-07-26 RX ADMIN — OXYCODONE HYDROCHLORIDE 5 MG: 5 TABLET ORAL at 11:22

## 2019-07-26 RX ADMIN — OXYCODONE HYDROCHLORIDE 5 MG: 5 TABLET ORAL at 23:05

## 2019-07-26 RX ADMIN — SODIUM CHLORIDE: 9 INJECTION, SOLUTION INTRAVENOUS at 05:29

## 2019-07-26 RX ADMIN — OXYCODONE HYDROCHLORIDE 5 MG: 5 TABLET ORAL at 18:33

## 2019-07-26 RX ADMIN — ACETAMINOPHEN 975 MG: 325 TABLET, FILM COATED ORAL at 05:28

## 2019-07-26 RX ADMIN — DEXAMETHASONE 3 MG: 1 TABLET ORAL at 21:15

## 2019-07-26 RX ADMIN — ACETAMINOPHEN 975 MG: 325 TABLET, FILM COATED ORAL at 21:15

## 2019-07-26 RX ADMIN — OXYCODONE HYDROCHLORIDE 5 MG: 5 TABLET ORAL at 03:35

## 2019-07-26 RX ADMIN — ACETAMINOPHEN 975 MG: 325 TABLET, FILM COATED ORAL at 13:27

## 2019-07-26 RX ADMIN — TIMOLOL MALEATE 1 DROP: 5 SOLUTION OPHTHALMIC at 19:52

## 2019-07-26 RX ADMIN — SENNOSIDES AND DOCUSATE SODIUM 2 TABLET: 8.6; 5 TABLET ORAL at 20:02

## 2019-07-26 RX ADMIN — DEXAMETHASONE 4 MG: 4 TABLET ORAL at 05:28

## 2019-07-26 RX ADMIN — PANTOPRAZOLE SODIUM 40 MG: 40 TABLET, DELAYED RELEASE ORAL at 08:14

## 2019-07-26 ASSESSMENT — PAIN DESCRIPTION - DESCRIPTORS
DESCRIPTORS: HEADACHE

## 2019-07-26 ASSESSMENT — ACTIVITIES OF DAILY LIVING (ADL)
ADLS_ACUITY_SCORE: 13

## 2019-07-26 NOTE — PROGRESS NOTES
Admitted/transferred from: PACU  Reason for admission/transfer: R frontal temporal cranny   Patient status upon admission/transfer: Stable  Interventions: Skin assessment, pain management, frequent neuro checks  Plan: neuro checks, manage pain  2 RN skin assessment: completed by Reid Cabral RN and JOSE Bourgeois.   Result of skin assessment and interventions/actions: Intact  Height, weight, drug calc weight: done  Patient belongings:   MDRO education (if applicable):

## 2019-07-26 NOTE — PROGRESS NOTES
Pt admitted to 4A from PACU on 7/25/19.    Neuro: A&Ox4, Follows commands, HA (treated with tylenol, oxycodone, and dilaudid), R-field visual cut (hx of Glaucoma).   Cv: BP maintained within goal <140 (Sys: 110-130), tachycardic at beginning of shift, SR currently. S1/S2 no adventitious sounds. Pulses +2 equal bilaterally. Cap refill <3.  Resp: NC 2L, SpO2: . Capnography to be continued until 1900 on 7/26. EtCo2: 28-35.  GI/: Bentley cath with AUO (75/hr) which was removed at 0600. Pt states BM on morning of 7/25. BS+, soft/nontender.  Skin: Incision site on R-temporal area. Skin warm and appropriate color.    Drips: NS @ 75ml/hr  Sedation: None    Plan: Continue to monitor, notify MD of acute changes. Transfer to .

## 2019-07-26 NOTE — PLAN OF CARE
Discharge Planner PT   Patient plan for discharge: Not addressed   Current status: Patient currently ambulating ~350ft with SBA due to mild unsteadiness. Patient performs SBA for transfers in and out of bed. Overall doing well post op. Occasionally nearly running into things on the R side due to baseline R field cut.   Barriers to return to prior living situation: None  Recommendations for discharge: Home with assistance for IADLS pending trial of stairs and formal balance assessment.   Rationale for recommendations: Patient minorly unsteady and hasn't trialed stairs yet.        Entered by: Feli Wilburn 07/26/2019 10:32 AM

## 2019-07-26 NOTE — PROGRESS NOTES
07/26/19 1000   Quick Adds   Type of Visit Initial PT Evaluation   Living Environment   Lives With spouse   Living Arrangements house   Home Accessibility stairs to enter home   Number of Stairs, Main Entrance 3   Transportation Anticipated family or friend will provide   Living Environment Comment Patient lives with his wife in a home with 3 PIETER. He can stay on the main level   Self-Care   Usual Activity Tolerance excellent   Current Activity Tolerance good   Regular Exercise Yes   Activity/Exercise Type walking   Exercise Amount/Frequency daily   Activity/Exercise/Self-Care Comment Patient is exercising daily walking several miles per day.    Functional Level Prior   Ambulation 0-->independent   Transferring 0-->independent   Toileting 0-->independent   Bathing 0-->independent   Communication 0-->understands/communicates without difficulty   Swallowing 0-->swallows foods/liquids without difficulty   Cognition 0 - no cognition issues reported   Fall history within last six months no   Which of the above functional risks had a recent onset or change? none   Prior Functional Level Comment Patient ambulates miles a day without AD. He does have a baseline R peripheral field deficit. He reports he occasionally is effected by this if people come up on his right side too quickly but overall has learned to adapt.    General Information   Onset of Illness/Injury or Date of Surgery - Date 07/25/19   Referring Physician Becca Stewart   Patient/Family Goals Statement Get back home    Pertinent History of Current Problem (include personal factors and/or comorbidities that impact the POC) 63 y/o male who presents for pre-operative H&P in preparation for Right Frontotemporal Craniotomy And Resection Of Tumor with Matthew Fox MD on 7/25/19 at [facility] . PMH HTN, HLD and glacoma surgery with baseline R visual field cut   Precautions/Limitations   (cranial)   General Observations R visual field  cut   General Info Comments Activity: Up with assistance    Cognitive Status Examination   Orientation orientation to person, place and time   Level of Consciousness alert   Follows Commands and Answers Questions 100% of the time   Personal Safety and Judgment intact   Memory intact   Cognitive Comment No deficits noted.    Pain Assessment   Patient Currently in Pain Yes, see Vital Sign flowsheet   Integumentary/Edema   Integumentary/Edema no deficits were identifed   Integumentary/Edema Comments Incision covered on examination with scant blood noted    Posture    Posture Not impaired   Range of Motion (ROM)   ROM Quick Adds No deficits were identified   Strength   Manual Muscle Testing Quick Adds No deficits were identified   Strength Comments Not formally tested due to cranial precautions- stands slowly but without A, reports baseline L ankle problems    Bed Mobility   Bed Mobility Comments Min A for supine to sit    Transfer Skills   Transfer Comments Sit to stand with SBA- appears minorly effortful, but able to do with assistance only for balance   Gait   Gait Comments Ambulated with decreased stance time on the RLE and quicker heel off on the L side, slightly wider RUDDY but not overt LOB. Gait is slow but steady throughout with SBA and no AD    Balance   Balance Comments Stands with SBA without UE support.    Sensory Examination   Sensory Perception Comments NT   Coordination   Coordination Comments NT   Muscle Tone   Muscle Tone Comments NT   General Therapy Interventions   Planned Therapy Interventions bed mobility training;balance training;gait training;transfer training;risk factor education;progressive activity/exercise   Clinical Impression   Criteria for Skilled Therapeutic Intervention yes, treatment indicated   PT Diagnosis Impaired functional mobility   Influenced by the following impairments Pain, high level balance deficits, R visual field cut and joint pain   Functional limitations due to  "impairments Bed mobility, transfers, gait and stairs    Clinical Presentation Stable/Uncomplicated   Clinical Presentation Rationale Stable vitals/neuro status    Clinical Decision Making (Complexity) Low complexity   Therapy Frequency Daily   Predicted Duration of Therapy Intervention (days/wks) 4 days    Anticipated Discharge Disposition Home with Assist   Risk & Benefits of therapy have been explained Yes   Patient, Family & other staff in agreement with plan of care Yes   Clinical Impression Comments Patient moving well post-op with mild unsteadiness noted. Anticipate with 1-2 more sessions and trial of stairs, patient will be able to discharge home with assistance from wife for heavy ADLs to adhere to cranial precautions.    [facility] Gema Touch-PAC TM \"6 Clicks\"   2016, Trustees of [facility], under license to [employer].  All rights reserved.   6 Clicks Short Forms Basic Mobility Inpatient Short Form   [facility] AM-PAC  \"6 Clicks\" V.2 Basic Mobility Inpatient Short Form   1. Turning from your back to your side while in a flat bed without using bedrails? 3 - A Little   2. Moving from lying on your back to sitting on the side of a flat bed without using bedrails? 3 - A Little   3. Moving to and from a bed to a chair (including a wheelchair)? 3 - A Little   4. Standing up from a chair using your arms (e.g., wheelchair, or bedside chair)? 3 - A Little   5. To walk in hospital room? 3 - A Little   6. Climbing 3-5 steps with a railing? 3 - A Little   Basic Mobility Raw Score (Score out of 24.Lower scores equate to lower levels of function) 18   Total Evaluation Time   Total Evaluation Time (Minutes) 5     "

## 2019-07-26 NOTE — PLAN OF CARE
Pt to transfer to 6A from 4A following R craniotomy. Pt travel via wheelchair, belongings sent, family with patient and aware of transfer status.  Pt with VSS, pain controlled, able to tolerate clear liquids with no further nausea.  L radial art line pulled this am approx 1100, BPs within parameters, no prns required.  Pt with baseline neuro status of R visual field cut, blurry vision in R eye, hx of glaucoma surgery/treatment.  PTA eye gtts ordered by MD.  Pt requires magnifying glass and flashlight to read, room service with assist ordered to facilitate meal ordering.  Bentley cath removed approx 0700 prior to shift change.  No void at time of transfer.  Continue plan of care, assess for neuro changes. Family present and supportive.

## 2019-07-26 NOTE — PLAN OF CARE
Shift:   VS: Temp: 98.6  F (37  C) Temp src: Oral BP: 120/70 Pulse: 73 Heart Rate: 79 Resp: 16 SpO2: 97 % O2 Device: None (Room air) Oxygen Delivery: 2 LPM  Pain: Given scheduled tylenol and prn oxycodone 5mg for mild headache  Neuro: Neuros at baseline. R visual field cut, blurry vision in R eye, hx of glaucoma surgery/treatment.   Respiratory: RA, denies SOB. CAPNO discontinued.  GI/Diet/Appetite: Good oral intake. Denies N/V.   :  Adequate UO. No BM yet. Urinal at bedside.  LDA's: PIVs SL  Skin: R temporal incision dressing intact. L radial pressure dressing intact.  Activity: Ambulated in hanson and bathroom with SBA and gait belt.  Tests/Procedures:   Pertinent Labs/Lab Collection:      Plan: .Continue with cares and update MD with any changes.

## 2019-07-27 VITALS
OXYGEN SATURATION: 94 % | BODY MASS INDEX: 25.71 KG/M2 | TEMPERATURE: 98.5 F | HEIGHT: 71 IN | RESPIRATION RATE: 16 BRPM | WEIGHT: 183.64 LBS | DIASTOLIC BLOOD PRESSURE: 84 MMHG | SYSTOLIC BLOOD PRESSURE: 120 MMHG | HEART RATE: 73 BPM

## 2019-07-27 PROCEDURE — 25000132 ZZH RX MED GY IP 250 OP 250 PS 637: Performed by: NEUROLOGICAL SURGERY

## 2019-07-27 PROCEDURE — 25000125 ZZHC RX 250: Performed by: NEUROLOGICAL SURGERY

## 2019-07-27 PROCEDURE — 25000131 ZZH RX MED GY IP 250 OP 636 PS 637: Performed by: STUDENT IN AN ORGANIZED HEALTH CARE EDUCATION/TRAINING PROGRAM

## 2019-07-27 PROCEDURE — 25000132 ZZH RX MED GY IP 250 OP 250 PS 637: Performed by: NURSE PRACTITIONER

## 2019-07-27 PROCEDURE — 25000131 ZZH RX MED GY IP 250 OP 636 PS 637: Performed by: NURSE PRACTITIONER

## 2019-07-27 RX ORDER — DEXAMETHASONE 2 MG/1
2 TABLET ORAL EVERY 8 HOURS
Qty: 1 TABLET | Refills: 0 | Status: SHIPPED | OUTPATIENT
Start: 2019-07-27 | End: 2019-07-28

## 2019-07-27 RX ORDER — OXYCODONE HYDROCHLORIDE 5 MG/1
5 TABLET ORAL EVERY 4 HOURS PRN
Qty: 40 TABLET | Refills: 0 | Status: SHIPPED | OUTPATIENT
Start: 2019-07-27

## 2019-07-27 RX ORDER — DEXAMETHASONE 2 MG/1
2 TABLET ORAL DAILY
Qty: 1 TABLET | Refills: 0 | Status: SHIPPED | OUTPATIENT
Start: 2019-07-30 | End: 2019-07-31

## 2019-07-27 RX ORDER — ACETAMINOPHEN 325 MG/1
650 TABLET ORAL EVERY 4 HOURS PRN
Qty: 1 BOTTLE | Refills: 0 | Status: SHIPPED | OUTPATIENT
Start: 2019-07-28

## 2019-07-27 RX ORDER — AMOXICILLIN 250 MG
1 CAPSULE ORAL 2 TIMES DAILY
Qty: 14 TABLET | Refills: 0 | Status: SHIPPED | OUTPATIENT
Start: 2019-07-27 | End: 2019-08-03

## 2019-07-27 RX ORDER — ONDANSETRON 4 MG/1
4 TABLET, ORALLY DISINTEGRATING ORAL EVERY 6 HOURS PRN
Qty: 20 TABLET | Refills: 0 | Status: SHIPPED | OUTPATIENT
Start: 2019-07-27 | End: 2019-08-03

## 2019-07-27 RX ORDER — DEXAMETHASONE 2 MG/1
2 TABLET ORAL EVERY 12 HOURS
Qty: 2 TABLET | Refills: 0 | Status: SHIPPED | OUTPATIENT
Start: 2019-07-28 | End: 2019-07-29

## 2019-07-27 RX ORDER — PANTOPRAZOLE SODIUM 40 MG/1
40 TABLET, DELAYED RELEASE ORAL
Qty: 3 TABLET | Refills: 0 | Status: SHIPPED | OUTPATIENT
Start: 2019-07-28 | End: 2019-07-31

## 2019-07-27 RX ADMIN — ONDANSETRON 4 MG: 4 TABLET, ORALLY DISINTEGRATING ORAL at 04:12

## 2019-07-27 RX ADMIN — PANTOPRAZOLE SODIUM 40 MG: 40 TABLET, DELAYED RELEASE ORAL at 07:12

## 2019-07-27 RX ADMIN — OXYCODONE HYDROCHLORIDE 5 MG: 5 TABLET ORAL at 15:10

## 2019-07-27 RX ADMIN — OXYCODONE HYDROCHLORIDE 5 MG: 5 TABLET ORAL at 07:13

## 2019-07-27 RX ADMIN — ACETAMINOPHEN 975 MG: 325 TABLET, FILM COATED ORAL at 13:10

## 2019-07-27 RX ADMIN — BRINZOLAMIDE 1 DROP: 10 SUSPENSION/ DROPS OPHTHALMIC at 07:13

## 2019-07-27 RX ADMIN — OXYCODONE HYDROCHLORIDE 5 MG: 5 TABLET ORAL at 04:02

## 2019-07-27 RX ADMIN — TIMOLOL MALEATE 1 DROP: 5 SOLUTION OPHTHALMIC at 07:13

## 2019-07-27 RX ADMIN — SENNOSIDES AND DOCUSATE SODIUM 2 TABLET: 8.6; 5 TABLET ORAL at 07:12

## 2019-07-27 RX ADMIN — DEXAMETHASONE 3 MG: 1 TABLET ORAL at 05:13

## 2019-07-27 RX ADMIN — DEXAMETHASONE 3 MG: 1 TABLET ORAL at 13:10

## 2019-07-27 RX ADMIN — ACETAMINOPHEN 975 MG: 325 TABLET, FILM COATED ORAL at 05:13

## 2019-07-27 ASSESSMENT — ACTIVITIES OF DAILY LIVING (ADL)
ADLS_ACUITY_SCORE: 13

## 2019-07-27 NOTE — PLAN OF CARE
POD#2 s/p Right Frontotemporal Craniotomy And Resection Of Tumor, VSS, Patient c/o headache relieved with Tylenol and Oxycodone. Up Independently. Shower completed from neck down. R crani incision with sutures, JAMES, CDI. Regular diet, tolerated well. No BM, passing gas, BS+, Senna given. Plan to discharge this afternoon, continue to monitor and follow POC.

## 2019-07-27 NOTE — OP NOTE
Procedure Date: 07/25/2019      PREOPERATIVE DIAGNOSES:     1.  Right clinoidal and planum sphenoidale meningioma.   2.  Vision loss secondary to #1.      POSTOPERATIVE DIAGNOSES:     1.  Right clinoidal and planum sphenoidale meningioma.     2.  Vision loss secondary to  #1.      TITLE OF PROCEDURES:   1.  Right frontal craniotomy.   2.  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   3.  Right optic canal decompression.   4.  Intraoperative use of microscope.      ATTENDING SURGEON:  Matthew Fox MD      RESIDENT SURGEON:  John Leschke, MD      ANESTHESIA:  GETA.      HISTORY OF PRESENT ILLNESS:  Mr. Mccarty is a 64-year-old man who presented with right-sided vision loss.  A formal ophthalmological examination confirmed visual field deficit, as well as a decline in his right-sided visual acuity.  Imaging studies show a moderate-sized enhancing tumor along the medial aspect of the planum sphenoidale with extension towards the right optic canal and anterior clinoid process.  There is marked elevation of the right optic nerve.  Imaging characteristics are consistent with a meningioma.  Because of the extension into the optic canal, we determined that a transcranial approach would give us the best chance of complete decompression of the optic nerve and achieve a gross total resection.  He presents for the above stated procedure.      DESCRIPTION OF PROCEDURE:  Upon intubation and induction of general anesthesia, the patient was placed in supine position on the operating table.  The Reynolds headholder was applied and his head was rotated about 30 degrees to the left and placed in a slight extension.  After securing his head in this position, his scalp was prepared and draped in the usual sterile fashion.  We marked out  a frontotemporal scalp incision just behind the hairline on the right side.  We planned to use the upper two-thirds of this incision.  The planned incision  was infiltrated with 0.5% bupivacaine with epinephrine.      The incision was carried down to the pericranium and temporalis fascia.  the upper 1/3 of the temporalis fascia and muscle were incised with the monopolar cautery.  The myocutaneous flap was elevated and retracted anteriorly and inferiorly with  fishhooks.  A single bur hole was made along the frontal convexity just above the superior temporal line.  A durotomy was identified in the bur hole.  We made a second bur hole in the superior aspect of the squamous temporal bone.  Next, we used the high-speed cutting drill to complete a frontal craniotomy, centered over the keyhole.  The bone flap was elevated and set aside.  The patient's dura was quite adherent and there were numerous durotomies.  We drilled down the inner table of the frontal bone and a flattened some of the sphenoid ridge.  We used the existing durotomy to complete the dural incision and retracted this anteriorly against the sphenoid ridge.  The operative microscope was brought into the field.      The frontal lobe was elevated and the carotid cistern was opened with the [place] blade.  We released some of the frontal lobe from its basal arachnoid attachments using micro scissors.  While the internal carotid artery was readily visible, the right optic nerve was splayed and was initially indiscernible from the underlying and adjacent tumor.  We worked medial to the tumor and released more of the basal arachnoid attachments.  We identified the left optic nerve and followed this posteriorly towards the optic chiasm.  However, the optic chiasm was not readily visible due to overhang from the tumor, as well as dense arachnoid investments.  We decided to identify the right optic nerve in its canal.  This would help define this portion of the nerve as well as provide early decompression.      The dura overlying the anterior clinoid process and the right optic canal was incised and flapped down.  The  optic canal was unroofed using a 2 mm mariya drill bit and under continuous irrigation.  We drilled down the bone to eggshell thickness and then curetted the remainder off.  We defined the medial and lateral aspects of the nerve and extended the drilling into the anterior clinoid process.  The optic canal was slightly hyperostotic.  We incised the optic sheath with the [place] blade and completed division of the falciform ligament with micro scissors.  We could see the transition between the normal-appearing nerve and the optic canal and its splayed cisternal component.  We still could not identify the medial border of the right optic nerve within the cistern and decided to debulk the medial aspect of the tumor.  We used a combination of micro scissors and the Sonopet ultrasonic aspirator.  We cauterized and took down the dural attachments off the planum.  Specimens were sent for permanent pathology.  Following the debulking, we were able to mobilize the tumor capsule off the basal frontal lobe.  We continued debulking more laterally underneath the right optic nerve.  The decompression of the tumor led to increased definition of the right optic nerve.        We then turned our attention to the carotid-optic space.  We took down the arachnoid overlying the space.  We identified the superior hypophyseal branches.  We mobilized the ICA laterally and resected the lateral aspect of the tumor using micro scissors and the Sonopet.  There was still residual tumor directly under the right optic nerve, but we could now see the lateral and medial borders.  We mobilized the tumor capsule further off the basal frontal lobe.  We dissected the tumor capsule sharply off the right olfactory tract and several basal frontal veins.  We finally identified  the optic chiasm and then identified its junction with the right optic nerve.  We continued mobilizing the capsule off of the undersurface of the right optic nerve and completed this  portion of the resection.  The right optic nerve was mostly decompressed.  There remained a small hidden portion under the optic nerve as it entered its canal.  We used a blunt micro hooks to deliver this portion of the tumor and sharply dissected this residual off of its dural base.  We inspected on either side of the optic canal and identified the prominent ophthalmic artery.  There was no obvious residual tumor.  The wound was irrigated and bleeding points were secured.  We then resected the dura off the planum, as well as the anterior clinoid process.  The optic apparatus appeared anatomically intact and fully decompressed.      There was one area of subpial dissection along the right orbitofrontal gyrus.  This was covered with Surgicel.  The dura was reapproximated with interrupted 4-0 Nurolon.  The durotomies were covered with a layer of DuraGen.  Bone flap was replaced and secured with Synthes mini plates and screws.  Temporalis fascia and muscle were reapproximated with interrupted 2-0 Vicryl.  The scalp was then closed in 2 layers.  A sterile dressing was applied.  The head burt was removed.  He was extubated and transported to the recovery room without incident.  Blood loss was approximately 200 mL.  Sponge and needle counts were correct at the end of the case.        I was present for the key portions and immediately available for the entire operation.  Please note that the firm consistency of the tumor and its dense adhesions to the right optic nerve increased the complexity of the operation and added 20% of time and effort.         MARE MONTE MD             D: 2019   T: 2019   MT: DA      Name:     DARNELL MCCONNELL   MRN:      [id number]        Account:        [id number]   :      1955           Procedure Date: 2019      Document: [id number]

## 2019-07-27 NOTE — PLAN OF CARE
Status: POD#2 s/p Right Frontotemporal Craniotomy And Resection Of Tumor  Vitals: VSS  Neuros: Intact ex R field cut and blurred vision of right eye  IV: PIV saline locked  Resp/trach: Lung sounds clear throughout  Diet: Regular  Bowel status: No BM, Senna given, BS+  : Voiding spontaneously  Skin: R crani incision with sutures, CDI  Pain: Patient c/o head pain relieved with Tylenol and Oxycodone  Activity: Up with SBA  Social: Patient calm and cooperative with cares, slept throughout night  Plan: Discharge home today at 1200, continue to monitor and follow POC

## 2019-07-27 NOTE — PLAN OF CARE
Status: POD#2 s/p Right Frontotemporal Craniotomy And Resection Of Tumor  Vitals: VSS on room air  Neuros: A&Ox4. slight blurred vision and vision field cut in right eye at baseline. 5/5 strengths and neuros intact otherwise.  IV: PIV removed for discharge.  Resp/trach: lung sounds clear. No respiratory issues.  Diet: regular diet. Tolerating well.  Bowel status: has not had a bowel movement since before surgery. bowel sounds active in all quadrants, passing gas, on bowel regimen.  Gu: voiding without difficulty.  Skin: right cranial incision with sutures and JAMES.  Pain: intermittent incisional pain. Tylenol and oxy prn for pain control.  Activity: up independently.  Social: family at bedside.  Plan: patient discharged home @ 1515 with family. Discharge instructions reviewed & sent home with patient. Medications picked up at pharmacy.

## 2019-07-28 ENCOUNTER — PATIENT OUTREACH (OUTPATIENT)
Dept: CARE COORDINATION | Facility: CLINIC | Age: 64
End: 2019-07-28

## 2019-07-28 NOTE — PLAN OF CARE
Physical Therapy Discharge Summary    Reason for therapy discharge:    Discharged to home.    Progress towards therapy goal(s). See goals on Care Plan in [facility] electronic health record for goal details.  Goals not met.  Barriers to achieving goals:   discharge from facility.    Therapy recommendation(s):    Continued therapy is recommended.  Rationale/Recommendations:  for continued progression of indep moblity.

## 2019-07-29 ENCOUNTER — DOCUMENTATION ONLY (OUTPATIENT)
Dept: PHARMACY | Facility: CLINIC | Age: 64
End: 2019-07-29

## 2019-07-29 ENCOUNTER — PATIENT OUTREACH (OUTPATIENT)
Dept: NEUROSURGERY | Facility: CLINIC | Age: 64
End: 2019-07-29

## 2019-07-29 LAB — COPATH REPORT: NORMAL

## 2019-07-29 NOTE — PROGRESS NOTES
Neurosurgery Discharge Follow-Up      Responsible Attending Physician:   Date of Discharge:    Discharge to:  Home    Current Status:  Pt is a 65 y/o male s/p 1.  Right frontal craniotomy.   2.  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   3.  Right optic canal decompression on .  Reports pre-op symptoms improved.   Operative  pain is decreasing.  Ambulating without assistance.  Pain well controlled with current meds, ample supply.  Reports incision CDI without signs of infection.  Denies redness, swelling, increased tenderness, or elevated temp.  Denies  bladder issues.  Pt state that he has not had a BM since last Wednesday.  Writer suggested OTC Miralax, Magnesium, and a suppository and follow the instructions on the package.  Writer and pt will touch base tomorrow.  Discharge instructions and medication use were reviewed.  RN triage/on call number given:  [phone] or after hours and w/e - [phone].  Patient verbalized understanding and agreement with current plan.    Follow up appointments/imaging/tests needed:

## 2019-07-29 NOTE — DISCHARGE SUMMARY
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  Right frontal craniotomy.   2.  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   3.  Right optic canal decompression.            Brief History of Illness:   Mr. Mccarty is a 64-year-old man who presented with right-sided vision loss.  A formal ophthalmological examination confirmed visual field deficit, as well as a decline in his right-sided visual acuity.  Imaging studies show a moderate-sized enhancing tumor along the medial aspect of the planum sphenoidale with extension towards the right optic canal and anterior clinoid process.  There is marked elevation of the right optic nerve.  Imaging characteristics are consistent with a meningioma.  Because of the extension into the optic canal, we determined that a transcranial approach would give us the best chance of complete decompression of the optic nerve and achieve a gross total resection.  He presented for the above stated procedures.            Hospital Course:   Patient underwent above-mentioned procedure on 7/25/19. The operation was uncomplicated and he was admitted to the surgical ICU for routine post operative cares. On post operative day 1 he was doing well and transferred to the floor. On post operative day 2, he was ambulating, voiding without a smiley, eating a regular diet, pain was well controlled and therefore he was discharged home.     Exam at discharge:  /84 (BP Location: Right arm)   Pulse  "73   Temp 98.5  F (36.9  C) (Oral)   Resp 16   Ht 1.803 m (5' 11\")   Wt 83.3 kg (183 lb 10.3 oz)   SpO2 94%   BMI 25.61 kg/m    S: Doing well postoperatively.  Continues to note ongoing right visual field cut, vision intact otherwise.     O:  Exam:  General: Awake;  Alert, In No Acute Distress  Pulm: Breathing Comfortably on room air   Mental status: Oriented x Person, Place and Time   Cranial Nerves: Cranial Nerves II-XII Intact Bilaterally  Strength:      Del Tr Bi WE WF Gr  R 5 5 5 5 5 5  L 5 5 5 5 5 5     HF KE KF DF PF   R 5 5 5 5 5   L 5 5 5 5 5     Pronator Drift: Absent  Sensory: Intact to Light Touch  Reflexes: No Hyperreflexia,  s or Clonus Present; Toes Down-Going Bilaterally  INCISION: Clean, Dry and Intact with Surgical Dressing            Discharge Medications:     Discharge Medication List as of 7/27/2019  2:41 PM      START taking these medications    Details   acetaminophen (TYLENOL) 325 MG tablet Take 2 tablets (650 mg) by mouth every 4 hours as needed for other (multimodal surgical pain management along with NSAIDS and opioid medication as indicated based on pain control and physical function.), Disp-1 Bottle, R-0, Local Print      !! dexamethasone (DECADRON) 2 MG tablet Take 1 tablet (2 mg) by mouth every 8 hours for 1 dose At 10pm, Disp-1 tablet, R-0, E-Prescribe      !! dexamethasone (DECADRON) 2 MG tablet Take 1 tablet (2 mg) by mouth every 12 hours for 1 day Starting 7/28 at 10pm, Disp-2 tablet, R-0, E-Prescribe      !! dexamethasone (DECADRON) 2 MG tablet Take 1 tablet (2 mg) by mouth daily for 1 day At 8am, Disp-1 tablet, R-0, E-Prescribe      ondansetron (ZOFRAN-ODT) 4 MG ODT tab Take 1 tablet (4 mg) by mouth every 6 hours as needed for nausea or vomiting, Disp-20 tablet, R-0, E-Prescribe      oxyCODONE (ROXICODONE) 5 MG tablet Take 1 tablet (5 mg) by mouth every 4 hours as needed for moderate to severe pain, Disp-40 tablet, R-0, Local Print      pantoprazole (PROTONIX) 40 MG " EC tablet Take 1 tablet (40 mg) by mouth every morning (before breakfast) for 3 days, Disp-3 tablet, R-0, E-Prescribe      senna-docusate (SENOKOT-S/PERICOLACE) 8.6-50 MG tablet Take 1 tablet by mouth 2 times daily for 7 days, Disp-14 tablet, R-0, E-Prescribe       !! - Potential duplicate medications found. Please discuss with provider.      CONTINUE these medications which have CHANGED    Details   aspirin (ASA) 81 MG tablet Take 1 tablet (81 mg) by mouth daily Please hold for 7 days after surgery (until 8/1), Historical         CONTINUE these medications which have NOT CHANGED    Details   brinzolamide (AZOPT) 1 % ophthalmic suspension Place 1 drop Into the left eye every morning , Historical      CRESTOR 10 MG tablet TAKE 1 TABLET (10 MG) BY MOUTH DAILY, Disp-90 tablet, R-0, E-Prescribe      famotidine (ACID REDUCER) 10 MG tablet Take 10 mg by mouth daily., 10 mg, Oral, DAILY, Until Discontinued, Historical      latanoprost (XALATAN) 0.005 % ophthalmic solution Place 1 drop into both eyes At Bedtime , Historical      ramipril (ALTACE) 10 MG capsule Take 10 mg by mouth, Historical      sodium chloride (OCEAN NASAL SPRAY) 0.65 % nasal spray Spray 1 spray in nostril daily as needed., 1 spray, Nasal, DAILY PRN, Until Discontinued, Historical      timolol maleate (TIMOPTIC) 0.5 % ophthalmic solution Place 1 drop Into the left eye every morning , Historical         STOP taking these medications       cefPROZIL (CEFZIL) 500 MG tablet Comments:   Reason for Stopping:                       Discharge Instructions and Follow-Up:     Discharge diet: Regular   Discharge activity:  -We encourage short frequent walks, increasing as tolerated.  - No driving until you are seen in clinic and cleared by your neurosurgeon or while on narcotics (oxycodone).  If you have had a seizure, you may not drive for at least 3 months according to [place] law.    - No strenuous activity.  - No lifting more than 10 pounds until you are seen  in clinic (a gallon of milk weighs approximately 8 pounds)    Discharge follow-up: You underwent surgery to have a brain tumor removed by Matthew Fox MD   - If you have not received the results of the pathology (diagnosis) at the time of discharge, our office will call you with these results as soon as they become available, or we will discuss them with you during your clinic visit, whichever is first.     - If you are on a steroid medication (decadron or dexamethasone) please follow the detailed instructions with the prescription to slowly decrease the amount you are taking.     - You will have follow up scheduled with the physician assistants and/or nurse practitioners in our clinic 2 weeks after your surgery.  If you live far away, you may see your primary care doctor for a wound check at 2 weeks.     - If you have not heard from our clinic about your follow up visit by 3-4 days following your discharge, please call our clinic at [phone] to schedule an appointment with the Neurosurgery teams.     - Follow up with Dr. Fox in neurosurgery clinic in 3 months with MRI brain with and without contrast.    Wound care:    - You are ok to shower, but do not soak your incisions. Pat them dry if they get damp.   - Avoid coloring your hair or permanent styling until cleared by your surgeon   - No baths, hot tubs or pools for 4-6 weeks after surgery.   - No strenuous activity.   - No lifting more than 10 pounds until you are seen in clinic (a gallon of milk weighs approximately 8 pounds)                 Please call if you have:  1. increased pain, redness, drainage, swelling at your incision  2. fevers > 101.5 F degrees  3. with any questions or concerns.  You may reach the Neurosurgery clinic at [phone] during regular work hours. ER at [phone].    and ask for the Neurosurgery Resident on call at [phone], during off hours or weekends.         Discharge Disposition:     Discharged to  home          Rene Rose MD  Neurosurgery Resident PGY2

## 2019-07-29 NOTE — PROGRESS NOTES
[facility]: Post-Discharge Note  SITUATION                                                      Admission:    Admission Date: 07/25/19   Reason for Admission: S/P craniotomy   Discharge:   Discharge Date: 07/27/19  Discharge Diagnosis: S/P craniotomy   Discharge Service: Neurosurgery     BACKGROUND                                                      TITLE OF PROCEDURES:   1.  Right frontal craniotomy.   2.  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   3.  Right optic canal decompression.   4.  Intraoperative use of microscope.     HISTORY OF PRESENT ILLNESS:  Mr. Mccarty is a 64-year-old man who presented with right-sided vision loss.  A formal ophthalmological examination confirmed visual field deficit, as well as a decline in his right-sided visual acuity.  Imaging studies show a moderate-sized enhancing tumor along the medial aspect of the planum sphenoidale with extension towards the right optic canal and anterior clinoid process.  There is marked elevation of the right optic nerve.  Imaging characteristics are consistent with a meningioma.  Because of the extension into the optic canal, we determined that a transcranial approach would give us the best chance of complete decompression of the optic nerve and achieve a gross total resection.  He presents for the above stated procedure.     ASSESSMENT      Discharge Assessment  Patient reports symptoms are: Improved  Does the patient have all of their medications?: Yes  Does patient know what their new medications are for?: Yes  Does patient have a follow-up appointment scheduled?: Yes  Does patient have any other questions or concerns?: No    Post-op  Did the patient have surgery or a procedure: Yes  Incision: healing  Drainage: No  Bleeding: none  Fever: No  Chills: No  Redness: No  Warmth: No  Swelling: No  Incision site pain: No  Eating & Drinking: eating and drinking without complaints/concerns  PO  Intake: regular diet  Bowel Function: normal  Urinary Status: voiding without complaint/concerns    PLAN                                                      Future Appointments   Date Time Provider Department Center   8/8/2019  3:30 PM Rosa M Hansen APRN [facility]           Marta Alvares, CMA

## 2019-07-30 ENCOUNTER — PATIENT OUTREACH (OUTPATIENT)
Dept: NEUROSURGERY | Facility: CLINIC | Age: 64
End: 2019-07-30

## 2019-09-16 ENCOUNTER — PATIENT OUTREACH (OUTPATIENT)
Dept: NEUROSURGERY | Facility: CLINIC | Age: 64
End: 2019-09-16

## 2019-09-16 ENCOUNTER — OFFICE VISIT (OUTPATIENT)
Dept: NEUROSURGERY | Facility: CLINIC | Age: 64
End: 2019-09-16
Payer: COMMERCIAL

## 2019-09-16 ENCOUNTER — ANCILLARY PROCEDURE (OUTPATIENT)
Dept: MRI IMAGING | Facility: CLINIC | Age: 64
End: 2019-09-16
Attending: NEUROLOGICAL SURGERY
Payer: COMMERCIAL

## 2019-09-16 VITALS
BODY MASS INDEX: 26.26 KG/M2 | HEART RATE: 74 BPM | OXYGEN SATURATION: 98 % | RESPIRATION RATE: 16 BRPM | SYSTOLIC BLOOD PRESSURE: 143 MMHG | DIASTOLIC BLOOD PRESSURE: 87 MMHG | HEIGHT: 71 IN | WEIGHT: 187.6 LBS

## 2019-09-16 DIAGNOSIS — Z98.890 S/P CRANIOTOMY: Primary | ICD-10-CM

## 2019-09-16 DIAGNOSIS — D32.0 BENIGN NEOPLASM OF CEREBRAL MENINGES (H): Primary | ICD-10-CM

## 2019-09-16 DIAGNOSIS — D32.0 BENIGN NEOPLASM OF CEREBRAL MENINGES (H): ICD-10-CM

## 2019-09-16 DIAGNOSIS — Z98.890 S/P CRANIOTOMY: ICD-10-CM

## 2019-09-16 LAB — RADIOLOGIST FLAGS: ABNORMAL

## 2019-09-16 RX ORDER — GADOBUTROL 604.72 MG/ML
7.5 INJECTION INTRAVENOUS ONCE
Status: COMPLETED | OUTPATIENT
Start: 2019-09-16 | End: 2019-09-16

## 2019-09-16 RX ADMIN — GADOBUTROL 7.5 ML: 604.72 INJECTION INTRAVENOUS at 11:25

## 2019-09-16 ASSESSMENT — PAIN SCALES - GENERAL: PAINLEVEL: NO PAIN (0)

## 2019-09-16 ASSESSMENT — MIFFLIN-ST. JEOR: SCORE: 1658.08

## 2019-09-16 NOTE — PATIENT INSTRUCTIONS
You have an appointment today for a 12:15pm MRI on the first [facility].  Dr. Fox will review the MRI and someone from his office will be in touch with you regarding the results and follow up.

## 2019-09-16 NOTE — LETTER
9/16/2019      RE: Vinh Mccarty  [address]  [place] [address]       See dictated note.    Matthew Fox MD

## 2019-09-16 NOTE — LETTER
9/16/2019       RE: Vinh Mccarty  [address]  [facility] [address]     Dear Colleague,    Thank you for referring your patient, Vinh Mccarty, to the [facility] NEUROSURGERY at [facility]. Please see a copy of my visit note below.    See dictated note.    Again, thank you for allowing me to participate in the care of your patient.      Sincerely,    Matthew Fox MD

## 2019-09-16 NOTE — LETTER
9/16/2019      RE: Vinh Mccarty  [address]  [place] [address]       See dictated note.    Matthew Fox MD

## 2019-09-16 NOTE — LETTER
9/16/2019       RE: Vinh Mccarty  [address]  [facility] [address]     Dear Colleague,    Thank you for referring your patient, Vinh Mccarty, to the [facility] NEUROSURGERY at [facility]. Please see a copy of my visit note below.    See dictated note.    Again, thank you for allowing me to participate in the care of your patient.      Sincerely,    Mathtew Fox MD

## 2019-09-16 NOTE — LETTER
9/16/2019       RE: Vinh Mccarty  [address]  [facility] [address]     Dear Colleague,    Thank you for referring your patient, Vinh Mccarty, to the [facility] NEUROSURGERY at [facility]. Please see a copy of my visit note below.    See dictated note.    Again, thank you for allowing me to participate in the care of your patient.      Sincerely,    Matthew Fox MD

## 2019-09-23 ENCOUNTER — PATIENT OUTREACH (OUTPATIENT)
Dept: NEUROSURGERY | Facility: CLINIC | Age: 64
End: 2019-09-23

## 2022-05-09 ENCOUNTER — TELEPHONE (OUTPATIENT)
Dept: NEUROSURGERY | Facility: CLINIC | Age: 67
End: 2022-05-09
Payer: COMMERCIAL

## 2022-05-09 NOTE — TELEPHONE ENCOUNTER
Patient called stating that he had a new MRI completed in April and would like Dr. Hodge to review. He is wondering about radiation vs. Another surgery. He is currently followed by Dr. Neal Blank at [facility] in [place]. Writer will forward to Dr. Hodge's team for follow up/next steps.

## 2022-05-19 ENCOUNTER — TELEPHONE (OUTPATIENT)
Dept: NEUROSURGERY | Facility: CLINIC | Age: 67
End: 2022-05-19
Payer: COMMERCIAL

## 2022-05-19 NOTE — TELEPHONE ENCOUNTER
Spoke with Vinh ARRINGTON RT 7/25/22 Right Frontotemporal Craniotomy and Resection of Tumor, currently being followed by Dr Abhay BENAVIDES in [place].  New MRI Brain completed on 4/20/22, Dr Blank recommending CyberKnife treatment.  Patient asking for appointment for RT to review films and give 2nd opinion.    Note sent to MR to get MR films for Dr Fox ot review.  Note sent to scheduling for appointment with RT to review.

## 2022-05-19 NOTE — TELEPHONE ENCOUNTER
Writer left voice mail for [facility] in [facility] for imaging to be pushed to [facility]. Reports verified in Care Everywhere    Ekaterina Trinidad LPN  Neurosurgery

## 2022-05-19 NOTE — TELEPHONE ENCOUNTER
Returned patient call. Patient reports that he had a MRI done in [place] that shows he has another lesion on his brain. In addition, his previous meningioma is growing in size. He would like to have a second opinion from Dr. Hodge as far as treatment options (radiation vs. Surgery). Writer forwarded to Dr. Hodge's team for further assistance.

## 2022-05-20 ENCOUNTER — CARE COORDINATION (OUTPATIENT)
Dept: NEUROSURGERY | Facility: CLINIC | Age: 67
End: 2022-05-20
Payer: COMMERCIAL

## 2022-05-20 NOTE — PROGRESS NOTES
Writer left voice mail for urgent request for 04/20/2022 MR imaging from [facility] to be pushed to [facility].     Ekaterina Trinidad LPN  Neurosurgery

## 2022-05-31 ENCOUNTER — TELEPHONE (OUTPATIENT)
Dept: NEUROSURGERY | Facility: CLINIC | Age: 67
End: 2022-05-31
Payer: COMMERCIAL

## 2022-06-02 ENCOUNTER — TELEPHONE (OUTPATIENT)
Dept: NEUROSURGERY | Facility: CLINIC | Age: 67
End: 2022-06-02
Payer: COMMERCIAL

## 2022-07-10 NOTE — TELEPHONE ENCOUNTER
7/11/2022-Request for images faxed to [facility]-MR @ 920am    7/22/2022-[facility] Images now in PACS-MR @ 921am        FUTURE VISIT INFORMATION      FUTURE VISIT INFORMATION:    Date: 7/26/2022    Time: 2pm    Location: [facility]  REFERRAL INFORMATION:    Referring provider:  Self     Referring providers clinic:      Reason for visit/diagnosis  Follow-up     RECORDS REQUESTED FROM:       Clinic name Comments Records Status Imaging Status   Internal Dr. Fox-9/16/2019, 6/17/2019    MR Brain-9/6/2019 Epic PACS         [facility]  NARCISO Uribe-4/25/2022    Dr. Blank-10/7/2019    MR IAC-4/20/2022    MR Brain-3/11/2022 Care EVerywhere Requested to PACS

## 2022-07-26 ENCOUNTER — VIRTUAL VISIT (OUTPATIENT)
Dept: NEUROSURGERY | Facility: CLINIC | Age: 67
End: 2022-07-26
Payer: COMMERCIAL

## 2022-07-26 ENCOUNTER — PRE VISIT (OUTPATIENT)
Dept: NEUROSURGERY | Facility: CLINIC | Age: 67
End: 2022-07-26

## 2022-07-26 DIAGNOSIS — D32.0 BENIGN NEOPLASM OF CEREBRAL MENINGES (H): Primary | ICD-10-CM

## 2022-07-26 PROCEDURE — 99207 PR NO DOCUMENTATION ON VISIT: CPT | Performed by: NEUROLOGICAL SURGERY

## 2022-07-26 RX ORDER — AMLODIPINE BESYLATE 5 MG/1
5 TABLET ORAL
COMMUNITY
Start: 2022-04-13

## 2022-07-26 NOTE — NURSING NOTE
Chief Complaint   Patient presents with     Telephone     Right Frontotemporal Craniotomy And Resection Of Tumor

## 2022-07-26 NOTE — LETTER
7/26/2022       RE: Vinh Mccarty  [address]  [place] [address]     Dear Colleague,    Thank you for referring your patient, Vihn Mccarty, to the [facility] NEUROSURGERY CLINIC [facility]. Please see a copy of my visit note below.    Vinh is a 67 year old who is being evaluated via a billable telephone visit.      What phone number would you like to be contacted at? [phone]  How would you like to obtain your AVS? Mail a copy  Phone call duration: 10 minutes    Feels nauseous intermittently, Very active. Fell recently and struck head, no LOC. No HA. Can't read from right eye. Eye exam pending with Dr. Malhotra. Glaucoma surgery right eye 5-6 yrs ago. Peripheral vision never recovered from right eye. [facility].    See dictated note.    SHAHLA Fox MD      Again, thank you for allowing me to participate in the care of your patient.      Sincerely,    Matthew Fox MD

## 2022-07-26 NOTE — LETTER
2022      RE: Vinh Mccarty  [address]  [place] [address]         Feels nauseous intermittently, Very active. Fell recently and struck head, no LOC. No HA. Can't read from right eye. Eye exam pending with Dr. Malhotra. Glaucoma surgery right eye 5-6 yrs ago. Peripheral vision never recovered from right eye. Repeat MRI with [facility] in 2023.    See dictated note.    SHAHLA Fox MD    Service Date: 2022    Paresh Alvarado MD   [facility]  [address] [place] [address]    RE:      Vinh Mccarty  MRN:  [id number]  :   1955    Dear Dr. Alvarado:    We spoke to Mr. Mccarty as part of a telephone followup in [facility] on 2022.  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  His vision did not improve following surgery.  Confounding his right-sided vision was his glaucoma, for which he underwent surgery 5-6 years ago.  His right temporal field deficit never recovered following surgery.  His visual acuity is not good enough to read from the right side.  His left-sided vision seems to be normal.    He fell recently and struck his head, but did not lose consciousness.  He denies any headaches.  He remains very active, but he does have intermittent nausea.    He was evaluated earlier this year by our neurosurgical colleagues up in [place] for a growing second meningioma.  The second meningioma is in the superior aspect of the right cerebellopontine cistern extending towards Meckel cave.  Because this posterior fossa meningioma has grown, he was evaluated for radiosurgery.  He saw Dr. Denney on 2022, and he recommended hypofractionated stereotactic radiosurgery.    Mr. Mccarty is not having any problems with his balance or facial sensation.  Apart from recovering from his recent fall, he is doing well.  He has an  upcoming eye exam with Dr. Malhotra.    Over the phone, he sounded well.  Speech, language and phonation were normal.    REVIEW OF STUDIES:  We went over his MRI from 04/20/2022.  We compared it to his previous MRIs.  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  It measures about 1 cm.  We do not see any obvious recurrence of the right clinoidal meningioma.  There is some atrophy of the right optic nerve, which is stable.    IMPRESSION AND PLAN:  Mr. Mccarty's vision has stabilized, and it did not improve substantially following resection of a clinoidal meningioma.  He has a second meningioma in the posterior fossa that has grown, but still remains small.  He is asymptomatic from the second meningioma.  We believe that Dr. Denney and Dr. Blank's recommendation for radiosurgery is reasonable, given the small size of the tumor and its current growth and anticipated growth.  On the other hand, continued observation is also reasonable.  There is room for this tumor to grow without necessarily compromising the efficacy of radiosurgery.  It is also possible that this tumor will stabilize and not grow further for a while.  Therefore, we agree that surgical resection of this tumor is unnecessary at this time.  Surgery remains an option because of the accessibility of the tumor.  Ultimately, we believe that continued observation is reasonable.  If there is continued growth, then we agree with his other physicians that radiosurgery would be better than surgery for his age.  We recommend repeating an MRI in 04/2023 (1 year from his last MRI).  This can be done at the [facility] in [place], [place].  He seemed to have a good understanding of all of the options, and we stressed to him that both observation and radiosurgery are reasonable.  We will also look forward to reviewing the results of his upcoming eye exam.  Please do not hesitate to  contact us with questions.    Sincerely,    Matthew Fox MD        D: 2022   T: 2022   MT: adriana    Name:     DARNELL MCCONNELL  MRN:      [id number]        Account:      [id number]   :      1955           Service Date: 2022       Document: [id number]

## 2022-07-26 NOTE — LETTER
2022       RE: Vinh Mccarty  [address]  [place] [address]     Dear Colleague,    Thank you for referring your patient, Vinh Mccarty, to the [facility] NEUROSURGERY CLINIC [place] at [facility]. Please see a copy of my visit note below.    Feels nauseous intermittently, Very active. Fell recently and struck head, no LOC. No HA. Can't read from right eye. Eye exam pending with Dr. Malhotra. Glaucoma surgery right eye 5-6 yrs ago. Peripheral vision never recovered from right eye. Repeat MRI with [facility] in 2023.    See dictated note.    SHAHLA Fox MD    Service Date: 2022    Paresh Alvarado MD   [facility]  [address] [place] [address]    RE:      Vinh Mccarty  MRN:  [id number]  :   1955    Dear Dr. Alvarado:    We spoke to Mr. Mccarty as part of a telephone followup in [facility] on 2022.  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  His vision did not improve following surgery.  Confounding his right-sided vision was his glaucoma, for which he underwent surgery 5-6 years ago.  His right temporal field deficit never recovered following surgery.  His visual acuity is not good enough to read from the right side.  His left-sided vision seems to be normal.    He fell recently and struck his head, but did not lose consciousness.  He denies any headaches.  He remains very active, but he does have intermittent nausea.    He was evaluated earlier this year by our neurosurgical colleagues up in [place] for a growing second meningioma.  The second meningioma is in the superior aspect of the right cerebellopontine cistern extending towards Meckel cave.  Because this posterior fossa meningioma has grown, he was evaluated for radiosurgery.  He saw Dr. Denney on  05/02/2022, and he recommended hypofractionated stereotactic radiosurgery.    Mr. Mccarty is not having any problems with his balance or facial sensation.  Apart from recovering from his recent fall, he is doing well.  He has an upcoming eye exam with Dr. Malhotra.    Over the phone, he sounded well.  Speech, language and phonation were normal.    REVIEW OF STUDIES:  We went over his MRI from 04/20/2022.  We compared it to his previous MRIs.  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  It measures about 1 cm.  We do not see any obvious recurrence of the right clinoidal meningioma.  There is some atrophy of the right optic nerve, which is stable.    IMPRESSION AND PLAN:  Mr. Mccarty's vision has stabilized, and it did not improve substantially following resection of a clinoidal meningioma.  He has a second meningioma in the posterior fossa that has grown, but still remains small.  He is asymptomatic from the second meningioma.  We believe that Dr. Denney and Dr. Blank's recommendation for radiosurgery is reasonable, given the small size of the tumor and its current growth and anticipated growth.  On the other hand, continued observation is also reasonable.  There is room for this tumor to grow without necessarily compromising the efficacy of radiosurgery.  It is also possible that this tumor will stabilize and not grow further for a while.  Therefore, we agree that surgical resection of this tumor is unnecessary at this time.  Surgery remains an option because of the accessibility of the tumor.  Ultimately, we believe that continued observation is reasonable.  If there is continued growth, then we agree with his other physicians that radiosurgery would be better than surgery for his age.  We recommend repeating an MRI in 04/2023 (1 year from his last MRI).  This can be done at the [facility] in [place], [place].  He seemed to  have a good understanding of all of the options, and we stressed to him that both observation and radiosurgery are reasonable.  We will also look forward to reviewing the results of his upcoming eye exam.  Please do not hesitate to contact us with questions.    Sincerely,    Matthew Fox MD        D: 2022   T: 2022   MT: adriana    Name:     DARNELL MCCONNELL  MRN:      [id number]        Account:      [id number]   :      1955           Service Date: 2022       Document: [id number]

## 2022-07-26 NOTE — LETTER
July 26, 2022       TO: Vinh Mccarty  [address]  [place] [address]       DearMr.Bibiana,    We are writing to inform you of your test results.    {ump results letter list:[id number]}    No results found from the In Basket message.    ***

## 2022-07-26 NOTE — PROGRESS NOTES
Vinh is a 67 year old who is being evaluated via a billable telephone visit.      What phone number would you like to be contacted at? [phone]  How would you like to obtain your AVS? Mail a copy  Phone call duration: 10 minutes    Feels nauseous intermittently, Very active. Fell recently and struck head, no LOC. No HA. Can't read from right eye. Eye exam pending with Dr. Malhotra. Glaucoma surgery right eye 5-6 yrs ago. Peripheral vision never recovered from right eye. Repeat MRI with [facility] in April 2023.    See dictated note.    SHAHLA Fox MD

## 2022-07-27 NOTE — PATIENT INSTRUCTIONS
Repeat MRI brain with contrast in April 2023 (ordered)    Phone follow up after MRI    Obtain eye exam records from Dr. Malhotra after upcoming appointment.

## 2022-07-27 NOTE — PROGRESS NOTES
Service Date: 2022    Paresh Alvarado MD   [facility]  [address] [place] [address]    RE:      Vinh Mccarty  MRN:  [id number]  :   1955    Dear Dr. Alvarado:    We spoke to Mr. Mccarty as part of a telephone followup in [facility] on 2022.  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  His vision did not improve following surgery.  Confounding his right-sided vision was his glaucoma, for which he underwent surgery 5-6 years ago.  His right temporal field deficit never recovered following surgery.  His visual acuity is not good enough to read from the right side.  His left-sided vision seems to be normal.    He fell recently and struck his head, but did not lose consciousness.  He denies any headaches.  He remains very active, but he does have intermittent nausea.    He was evaluated earlier this year by our neurosurgical colleagues up in [place] for a growing second meningioma.  The second meningioma is in the superior aspect of the right cerebellopontine cistern extending towards Meckel cave.  Because this posterior fossa meningioma has grown, he was evaluated for radiosurgery.  He saw Dr. Denney on 2022, and he recommended hypofractionated stereotactic radiosurgery.    Mr. Mccarty is not having any problems with his balance or facial sensation.  Apart from recovering from his recent fall, he is doing well.  He has an upcoming eye exam with Dr. Malhotra.    Over the phone, he sounded well.  Speech, language and phonation were normal.    REVIEW OF STUDIES:  We went over his MRI from 2022.  We compared it to his previous MRIs.  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  It measures about 1 cm.  We do not see any obvious recurrence of the right clinoidal meningioma.  There is  some atrophy of the right optic nerve, which is stable.    IMPRESSION AND PLAN:  Mr. Mccarty's vision has stabilized, and it did not improve substantially following resection of a clinoidal meningioma.  He has a second meningioma in the posterior fossa that has grown, but still remains small.  He is asymptomatic from the second meningioma.  We believe that Dr. Denney and Dr. Blank's recommendation for radiosurgery is reasonable, given the small size of the tumor and its current growth and anticipated growth.  On the other hand, continued observation is also reasonable.  There is room for this tumor to grow without necessarily compromising the efficacy of radiosurgery.  It is also possible that this tumor will stabilize and not grow further for a while.  Therefore, we agree that surgical resection of this tumor is unnecessary at this time.  Surgery remains an option because of the accessibility of the tumor.  Ultimately, we believe that continued observation is reasonable.  If there is continued growth, then we agree with his other physicians that radiosurgery would be better than surgery for his age.  We recommend repeating an MRI in 2023 (1 year from his last MRI).  This can be done at the [facility] in [place], [place].  He seemed to have a good understanding of all of the options, and we stressed to him that both observation and radiosurgery are reasonable.  We will also look forward to reviewing the results of his upcoming eye exam.  Please do not hesitate to contact us with questions.    Sincerely,    Matthew Fox MD        D: 2022   T: 2022   MT: adriana    Name:     DARNELL MCCARTY  MRN:      [id number]        Account:      [id number]   :      1955           Service Date: 2022       Document: [id number]

## 2022-07-28 ENCOUNTER — TELEPHONE (OUTPATIENT)
Dept: NEUROSURGERY | Facility: CLINIC | Age: 67
End: 2022-07-28

## 2022-07-28 NOTE — TELEPHONE ENCOUNTER
[facility] Call Center    Phone Message    May a detailed message be left on voicemail: yes     Reason for Call:    Patient called request  fax MRI orders to [facility] [phone]    Action Taken: Message routed to:  [facility] ([facility] neurosurgery    Travel Screening: Not Applicable

## 2022-07-29 NOTE — TELEPHONE ENCOUNTER
Writer faxed MRI order to [facility]   Fax: [phone]    Left patient a voice mail regarding above with call back number .     Ekaterina Trinidad LPN  Neurosurgery

## 2023-04-13 ENCOUNTER — TELEPHONE (OUTPATIENT)
Dept: NEUROSURGERY | Facility: CLINIC | Age: 68
End: 2023-04-13
Payer: COMMERCIAL

## 2023-04-13 NOTE — TELEPHONE ENCOUNTER
Writer resolved 04/10/2023 MR Head Brain [facility] in Pacs/Report in Care Everywhere. Called patient to let him know imaging received. Per patient request, sent message to RNCC for Dr. Fox.     Ekaterina Trinidad LPN  Neurosurgery

## 2023-04-13 NOTE — TELEPHONE ENCOUNTER
[facility] Call Center    Phone Message    May a detailed message be left on voicemail: yes     Reason for Call: Other: Patient called wanting to confirm that his recent MRI done 4/10/2023 at [facility] in [place], [place] was received by Dr. Fox. Patient is wondering what the next steps are and how to proceed.     Action Taken: Message routed to:  Other: Neurosurgery    Travel Screening: Not Applicable

## 2023-04-18 NOTE — PROGRESS NOTES
"Neurosurgery Progress Note      S: Doing well postoperatively.  Continues to note ongoing right visual field cut, vision intact otherwise.     O:  Exam:  /72   Pulse (!) 224   Temp 99.4  F (37.4  C) (Oral)   Resp 18   Ht 1.803 m (5' 11\")   Wt 83.3 kg (183 lb 10.3 oz)   SpO2 96%   BMI 25.61 kg/m    General: Awake;  Alert, In No Acute Distress  Pulm: Breathing Comfortably on room air   Mental status: Oriented x Person, Place and Time   Cranial Nerves: Cranial Nerves II-XII Intact Bilaterally  Strength:      Del Tr Bi WE WF Gr  R 5 5 5 5 5 5  L 5 5 5 5 5 5     HF KE KF DF PF   R 5 5 5 5 5   L 5 5 5 5 5     Pronator Drift: Absent  Sensory: Intact to Light Touch  Reflexes: No Hyperreflexia,  s or Clonus Present; Toes Down-Going Bilaterally  INCISION: Clean, Dry and Intact with Surgical Dressing     Assessment: Mr. Mccarty is a 59 year old male with Right-Sided Peripheral Vision Loss Related to the Presence of Meningioma Abutting the Optic Nerve and Internal Carotid Artery; Now S/P Right Frontotemporal Craniotomy for Tumor Resection.     Plan:     Neuro:   Neuro Examination Q 4HR   Sutures out: 8/8/2019     HEENT: Continue home Eye Drops (Brinzolamide, Latanoprost, and Timolol)    Cardiovascular: Goal SBP < 160 mmHg; Continue home Ramipril 10 mg QD and Crestor 10 mg QD; Hold home Aspirin 81 mmHg until 8/1    Pulmonary: Incentive Spirometry Q 1 HR While Awake    Gastrointestinal: Advance Diet as Tolerated; Bowel Regimen     Renal: Remove Bentley on Post-Operative Day #1     Heme: No issues     Endocrine: No issues    Infectious: Racheal-Operative Antibiotics Completed    PT/OT: Evaluations Pending     DVT prophylaxis: Sequential compression devices to Bilateral Lower Extremities    Ulcer prophylaxis: Not Indicated    DISPO:  6A    Barriers: evaluation by therapies      RYLEE Lizama CNP  Department of Neurosurgery  Pager: [phone]    I have reviewed the history above and agree with the " resident's assessment and plan.  SHAHLA Fox MD     Body Location Override (Optional - Billing Will Still Be Based On Selected Body Map Location If Applicable): UMBILICUS Detail Level: Detailed Depth Of Biopsy: dermis Was A Bandage Applied: Yes Size Of Lesion In Cm: 0.3 X Size Of Lesion In Cm: 0 Biopsy Type: H and E Biopsy Method: double edge Personna blade Anesthesia Type: 1% lidocaine with epinephrine Anesthesia Volume In Cc (Will Not Render If 0): 0.5 Hemostasis: Aluminum Chloride Wound Care: Vaseline Dressing: bandage Destruction After The Procedure: No Type Of Destruction Used: Curettage Curettage Text: The wound bed was treated with curettage after the biopsy was performed. Cryotherapy Text: The wound bed was treated with cryotherapy after the biopsy was performed. Electrodesiccation Text: The wound bed was treated with electrodesiccation after the biopsy was performed. Electrodesiccation And Curettage Text: The wound bed was treated with electrodesiccation and curettage after the biopsy was performed. Silver Nitrate Text: The wound bed was treated with silver nitrate after the biopsy was performed. Lab: [id number] Consent: Written consent was obtained and risks were reviewed including but not limited to scarring, infection, bleeding, scabbing, incomplete removal, nerve damage and allergy to anesthesia. Post-Care Instructions: I reviewed with the patient in detail post-care instructions. Patient is to keep the biopsy site dry overnight, and then apply bacitracin twice daily until healed. Patient may apply hydrogen peroxide soaks to remove any crusting. Notification Instructions: Patient will be notified of biopsy results. However, patient instructed to call the office if not contacted within 2 weeks. Billing Type: [employer] Information: Selecting Yes will display possible errors in your note based on the variables you have selected. This validation is only offered as a suggestion for you. PLEASE NOTE THAT THE VALIDATION TEXT WILL BE REMOVED WHEN YOU FINALIZE YOUR NOTE. IF YOU WANT TO FAX A PRELIMINARY NOTE YOU WILL NEED TO TOGGLE THIS TO 'NO' IF YOU DO NOT WANT IT IN YOUR FAXED NOTE. Body Location Override (Optional - Billing Will Still Be Based On Selected Body Map Location If Applicable): LEFT NASOLABIAL FOLD Size Of Lesion In Cm: 0.4 Lab: [id number] Billing Type: Third-Party Bill

## 2023-05-02 ENCOUNTER — TELEPHONE (OUTPATIENT)
Dept: NEUROSURGERY | Facility: CLINIC | Age: 68
End: 2023-05-02
Payer: COMMERCIAL

## 2023-05-02 NOTE — TELEPHONE ENCOUNTER
Writer confirmed 04/10/2023 MRI [facility] pushed to PAC, report in Epic/Care Everywhere. Routed message to Neurosurgery Clinic Scheduling for appointment with Dr. Fox to review imaging results. Marilyn Farnsworth RNCC included for awareness. Patient asking for next steps.     Ekaterina Trinidad LPN  Neurosurgery

## 2023-05-02 NOTE — TELEPHONE ENCOUNTER
Health Call Center    Phone Message    May a detailed message be left on voicemail: yes     Reason for Call: Other: Patient is calling to check on the results and status of Dr. Fox reviewing his MRI. The patient would like a call back to discuss what his next steps are.     Action Taken: Message routed to:  [facility] ([facility]): [facility] Neurosurgery    Travel Screening: Not Applicable

## 2023-05-18 ENCOUNTER — TELEPHONE (OUTPATIENT)
Dept: NEUROSURGERY | Facility: CLINIC | Age: 68
End: 2023-05-18
Payer: COMMERCIAL

## 2023-05-18 NOTE — TELEPHONE ENCOUNTER
Writer routed to Neurosurgery Clinic Scheduling   Imaging in Pacs/Report scanned to patient chart.     *Patient requesting earlier appointment.     Ekaterina Trinidad LPN  Neurosurgery

## 2023-05-18 NOTE — TELEPHONE ENCOUNTER
Health Call Center    Phone Message    May a detailed message be left on voicemail: yes     Reason for Call: Other: Pt would like a call back with his MRI results writer did offer him a phone appt with  but Pt would not like to wait until July. Please call Pt back.  Pt would like to know if he will be needing surgery and would like to get that on the books if so.     Action Taken: Message routed to:  [facility]): Neurosurgery    Travel Screening: Not Applicable

## 2023-05-30 ENCOUNTER — VIRTUAL VISIT (OUTPATIENT)
Dept: NEUROSURGERY | Facility: CLINIC | Age: 68
End: 2023-05-30
Payer: COMMERCIAL

## 2023-05-30 DIAGNOSIS — D32.0 CEREBRAL MENINGIOMA (H): ICD-10-CM

## 2023-05-30 DIAGNOSIS — D32.0 BENIGN NEOPLASM OF CEREBRAL MENINGES (H): Primary | ICD-10-CM

## 2023-05-30 PROCEDURE — 99202 OFFICE O/P NEW SF 15 MIN: CPT | Mod: 93 | Performed by: NEUROLOGICAL SURGERY

## 2023-05-30 NOTE — PATIENT INSTRUCTIONS
Repeat MRI brain with contrast at [facility] in one year (ordered).    Stroke & Endovascular RN Care Coordinators:    Vania Barajas, RN, BSN  Maricruz Sawyer, RN, CNRN, SCRN    If you have any questions please contact the RN Care Coordinators at [phone], option 1.     After business hours call the  at [phone] and have the Neuro-Interventional Fellow paged.    Thank you for choosing [facility] for your health care needs.

## 2023-05-30 NOTE — NURSING NOTE
Is the patient currently in the state of [place]? YES    Visit mode:VIDEO    If the visit is dropped, the patient can be reconnected by: TELEPHONE VISIT: Phone number: [phone]    Will anyone else be joining the visit? NO      How would you like to obtain your AVS? Mail a copy    Are changes needed to the allergy or medication list? NO    Reason for visit: Telephone (Benign neoplasm of cerebral meninges )

## 2023-05-30 NOTE — PROGRESS NOTES
Virtual Visit Details    Type of service:  Telephone Visit   Phone call duration: 15 minutes     Feels well in general. Right vision remains poor. Can still drive but not at night.   No balance issues. Very active.  No facial pain. Right eye throbbing intermittently 1/week, very brief.     Repeat MRI at [facility] in one year    Dr. Malhotra is ophthalmologist.     See dictated note.    SHAHLA Fox MD

## 2023-05-30 NOTE — LETTER
2023       RE: Vinh Mccarty  [address]  [place] [address]     Dear Colleague,    Thank you for referring your patient, Vinh Mccarty, to the [facility] NEUROSURGERY CLINIC [place] at [facility]. Please see a copy of my visit note below.    Virtual Visit Details    Type of service:  Telephone Visit   Phone call duration: 15 minutes     Feels well in general. Right vision remains poor. Can still drive but not at night.   No balance issues. Very active.  No facial pain. Right eye throbbing intermittently 1/week, very brief.     Repeat MRI at [facility] in one year    Dr. Malhotra is ophthalmologist.     See dictated note.    SHAHLA Fox MD    Service Date: 2023    Paresh Alvarado MD  [facility]  [address] [place]  [address]    RE:  Vinh Mccarty  MRN:  [id number]  :  1955    Dear Dr. Alvarado:    We spoke to Mr. Mccarty as part of a telephone followup in [facility] today.  He is 4 years out from a right frontal craniotomy and resection of clinoidal meningioma.  His right sided vision did not recover following surgery. He reports stability in his vision, although Dr. Malhotra's exam notes are not available to us.  He is still able to drive, but he avoids night driving.  He denies any problems with his balance.  He is very active.  He is not having any facial pain but has very brief episodes once a week of right eye throbbing.  The symptoms last a few seconds and then resolve.  He denies any sensory changes in the face.    Over the phone, his speech, language and phonation were normal.    REVIEW OF STUDIES:  We went over his MRI from 04/10/2023 and compared it to last year's study.  All of the reports suggest up to 4 mm of growth in the remaining right petrous apex meningioma.  We believe there has been minimal growth.  The mass effect on the ventral lateral pedro is  unchanged.  We do not see any recurrence of the resected right clinoidal meningioma.    IMPRESSION AND PLAN:  The posterior fossa meningioma remains small, approximately 12-13 mm.  We believe the growth has been minimal over the past few years and can continue observation.  If there is obvious growth, then radiosurgery would likely be the best option, though craniotomy and resection can also be considered.  He should continue with his scheduled eye exams with Dr. Malhotra.  Please do not hesitate to contact us with questions.    Sincerely,    Matthew Fox MD        D: 2023   T: 2023   MT: per    Name:     DARNELL MCCONNELL  MRN:      [id number]        Account:      [id number]   :      1955           Service Date: 2023       Document: [id number]

## 2023-05-30 NOTE — PROGRESS NOTES
Service Date: 2023    Paresh Alvarado MD  [facility]  [address] [place]  [address]    RE:  Darnell Mccarty  MRN:  [id number]  :  1955    Dear Dr. Alvarado:    We spoke to Mr. Mccarty as part of a telephone followup in [facility] today.  He is 4 years out from a right frontal craniotomy and resection of clinoidal meningioma.  His right sided vision did not recover following surgery. He reports stability in his vision, although Dr. Malhotra's exam notes are not available to us.  He is still able to drive, but he avoids night driving.  He denies any problems with his balance.  He is very active.  He is not having any facial pain but has very brief episodes once a week of right eye throbbing.  The symptoms last a few seconds and then resolve.  He denies any sensory changes in the face.    Over the phone, his speech, language and phonation were normal.    REVIEW OF STUDIES:  We went over his MRI from 04/10/2023 and compared it to last year's study.  All of the reports suggest up to 4 mm of growth in the remaining right petrous apex meningioma.  We believe there has been minimal growth.  The mass effect on the ventral lateral pedro is unchanged.  We do not see any recurrence of the resected right clinoidal meningioma.    IMPRESSION AND PLAN:  The posterior fossa meningioma remains small, approximately 12-13 mm.  We believe the growth has been minimal over the past few years and can continue observation.  If there is obvious growth, then radiosurgery would likely be the best option, though craniotomy and resection can also be considered.  He should continue with his scheduled eye exams with Dr. Malhotra.  Please do not hesitate to contact us with questions.    Sincerely,    Matthew Fox MD        D: 2023   T: 2023   MT: per    Name:     DARNELL MCCARTY  MRN:      [id number]        Account:      [id number]   :      1955            Service Date: 05/30/2023       Document: [id number]

## 2023-05-30 NOTE — LETTER
2023      RE: Darnell Mccarty  [address]  [place] [address]           Service Date: 2023    Paresh Alvarado MD  [facility]  [address] [place]  [address]    RE:  Darnell Mccarty  MRN:  [id number]  :  1955    Dear Dr. Alvarado:    We spoke to Mr. Mccarty as part of a telephone followup in Cranial Neurosurgery Clinic today.  He is 4 years out from a right frontal craniotomy and resection of clinoidal meningioma.  His right sided vision did not recover following surgery. He reports stability in his vision, although Dr. Malhotra's exam notes are not available to us.  He is still able to drive, but he avoids night driving.  He denies any problems with his balance.  He is very active.  He is not having any facial pain but has very brief episodes once a week of right eye throbbing.  The symptoms last a few seconds and then resolve.  He denies any sensory changes in the face.    Over the phone, his speech, language and phonation were normal.    REVIEW OF STUDIES:  We went over his MRI from 04/10/2023 and compared it to last year's study.  All of the reports suggest up to 4 mm of growth in the remaining right petrous apex meningioma.  We believe there has been minimal growth.  The mass effect on the ventral lateral pedro is unchanged.  We do not see any recurrence of the resected right clinoidal meningioma.    IMPRESSION AND PLAN:  The posterior fossa meningioma remains small, approximately 12-13 mm.  We believe the growth has been minimal over the past few years and can continue observation.  If there is obvious growth, then radiosurgery would likely be the best option, though craniotomy and resection can also be considered.  He should continue with his scheduled eye exams with Dr. Malhotra.  Please do not hesitate to contact us with questions.    Sincerely,    Matthew Fox MD        D: 2023   T: 2023   MT: per    Name:     YOONDARNELL ZAMORA  DENI.  MRN:      [id number]        Account:      [id number]   :      1955           Service Date: 2023       Document: [id number]     Matthew Fox MD

## 2024-03-08 ENCOUNTER — DOCUMENTATION ONLY (OUTPATIENT)
Dept: NEUROSURGERY | Facility: CLINIC | Age: 69
End: 2024-03-08
Payer: COMMERCIAL

## 2024-03-08 NOTE — CONFIDENTIAL NOTE
Faxed MRI order to [facility] at fax# [phone]. Confirmed order was received. Spoke to patient let him know order was faxed to  in [place] per his request. patient aware and will call to scheduled. No other questions or concerns at this time.

## 2024-06-26 ENCOUNTER — CARE COORDINATION (OUTPATIENT)
Dept: NEUROSURGERY | Facility: CLINIC | Age: 69
End: 2024-06-26
Payer: COMMERCIAL

## 2024-06-26 ENCOUNTER — TELEPHONE (OUTPATIENT)
Dept: NEUROSURGERY | Facility: CLINIC | Age: 69
End: 2024-06-26
Payer: COMMERCIAL

## 2024-06-26 NOTE — TELEPHONE ENCOUNTER
Outbound call to [facility],  Dr Tawana Ritter is patients Ophthalmology physician .  Patient had yearly exam in Jan 2024, appointment on July 11th is only a pressure check.  Faxing report to Dr Fox now at .  Note sent to to Ruddy's team to try and find an OV RTN Telephone appointment to review MRI Brain, film now in PACS.    Voices understanding.

## 2024-06-26 NOTE — TELEPHONE ENCOUNTER
Spoke with patient  MRI completed on 5/30/24 at [facility] in [facility], films need to push to PAC, report in Care Everywhere.  Patient has eye exam on 7/11/24 At [facility]  will call to get report faxed to Dr crump.    Explained appointment will be set for Telephone OV with Dr Crump after 7/11 so he has all the material needed.  Patient has eye vision issues and important to have office visit information.      Will call back with Telephone OV info  Voices understanding

## 2024-07-01 ENCOUNTER — TELEPHONE (OUTPATIENT)
Dept: NEUROSURGERY | Facility: CLINIC | Age: 69
End: 2024-07-01
Payer: COMMERCIAL

## 2024-07-01 NOTE — TELEPHONE ENCOUNTER
RECORDS RECEIVED FROM: Care Everywhere   REASON FOR VISIT: Benign neoplasm of cerebral meninges   PROVIDER: Matthew Fox MD   DATE OF APPT: 7/10/24 @ 1:30 pm    NOTES (FOR ALL VISITS) STATUS DETAILS   OFFICE NOTE from referring provider Internal 7/1/24 (Phone Note)    OFFICE NOTE from other specialist Care Everywhere 5/30/24, 7/26/22, 9/16/19, 6/17/19 [facility]    4/25/22 Carol Uribe APRN, CNP @[facility] Neurosurgery    10/7/19 Neal Blank MD  @[facility] Neurosurgery    9/13/19 Kobi Valdovinos MD  @[facility] Ophthalmology    See Additional Encounters   DISCHARGE SUMMARY from hospital Internal 7/25/19-7/27/19 Matthew Fox MD @[facility]     OPERATIVE REPORT Internal 7/25/19 Matthew Fox MD @[facility] Right Frontotemporal Craniotomy And Resection Of Tumor      MEDICATION LIST Internal    IMAGING  (FOR ALL VISITS)     MRI (HEAD, NECK, SPINE) Internal [facility]  5/30/24 MR Brain  4/10/23 MR Brain  4/20/22 MR IAC  4/16/19 MR [facility] Face & or Neck  4/16/19 MR Brain      [facility]  9/16/19 MR Brain     CT (HEAD, NECK, SPINE) Internal [facility]  3/4/20 CT Head  1/2/20 CT Head  11/11/19 CT Head  10/7/19 CT Head

## 2024-07-01 NOTE — TELEPHONE ENCOUNTER
Appointment with RT   7/10/24 @ 130PM Telephone office visit with Dr Fox    Left detailed message for appointment 7/10/23 130 PM Telephone Dr Fox.    Please call Laurie GARCIA to verify

## 2024-07-10 ENCOUNTER — PRE VISIT (OUTPATIENT)
Dept: NEUROSURGERY | Facility: CLINIC | Age: 69
End: 2024-07-10

## 2024-07-10 ENCOUNTER — VIRTUAL VISIT (OUTPATIENT)
Dept: NEUROSURGERY | Facility: CLINIC | Age: 69
End: 2024-07-10
Payer: COMMERCIAL

## 2024-07-10 VITALS — HEIGHT: 70 IN | BODY MASS INDEX: 24.34 KG/M2 | WEIGHT: 170 LBS

## 2024-07-10 DIAGNOSIS — D32.0 CEREBRAL MENINGIOMA (H): Primary | ICD-10-CM

## 2024-07-10 PROCEDURE — 99213 OFFICE O/P EST LOW 20 MIN: CPT | Performed by: NEUROLOGICAL SURGERY

## 2024-07-10 RX ORDER — OMEPRAZOLE 40 MG/1
CAPSULE, DELAYED RELEASE ORAL
COMMUNITY
Start: 2024-01-31

## 2024-07-10 ASSESSMENT — PAIN SCALES - GENERAL: PAINLEVEL: NO PAIN (0)

## 2024-07-10 NOTE — LETTER
7/10/2024       RE: Vinh Mccarty  [address]  [place] [address]     Dear Colleague,    Thank you for referring your patient, Vinh Mccarty, to the [facility] [place] at [facility]. Please see a copy of my visit note below.    Virtual Visit Details  Date of service: 7/10/2024  Type of service:  Telephone Visit   Phone call duration: 25 minutes   Originating Location (pt. Location): Home    Distant Location (provider location):  On-site    Paresh Alvarado MD  [facility]  [address] [place]  [address]     RE:  Vinh Mccarty  MRN:  [id number]  :  1955     Dear Dr. Alvarado:    We spoke to Mr. Mccarty as part of a telephone follow-up in cranial neurosurgery clinic today.  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    At his recent 's license renewal, the vision exam suggested worsening, and he now has a restricted license.  For the most part, he is independent and feels well otherwise.  Specifically, he denies any problems with balance or hearing.  He denies any facial pain or arm sensory changes.    Over the phone, his speech and language and phonation were normal.    We reviewed the MRI from 2024 and compared it to the past few studies.  There is no obvious residual or recurrence of the resected right clinoidal meningioma.  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  There is mild increase in the size of the right petrous ridge meningioma with mild compression of the right ventral lateral pedro.  The origin of the right trigeminal nerve also appears compressed.  There is no edema in the brainstem.  The tumor measures about 15 mm in maximum dimension and has grown considerably compared to the studies dating back to 2019.    Given the current size of the tumor, its continued  growth, and his good life expectancy, we believe treatment is indicated.  We discussed surgery and radiosurgery for this tumor.  We went over the advantages and disadvantages of each option.  If he is open to the idea of an additional craniotomy, we believe this is the best option.  If not, then radiosurgery is the best option.  He seemed to have good understanding and we will check in with him later this month to answer any additional questions.  We will keep you informed of his progress.  Please not hesitate contact us with questions.    Sincerely,        Matthew Fox MD  Department of Neurosurgery

## 2024-07-10 NOTE — LETTER
October 1, 2024    Vinh Mccarty  [address]  [place] [address]    Dear Vinh,     Below you will find a schedule of appointments and information about preparing for surgery. Please review the information and let me know what questions you have.      Thank you,  Laurie Greer,  RN, BSN  Neurosurgery Care Coordinator  [facility] Neurosurgery  [phone]        Surgery     Surgery: Right Retrosigmoid approach for resection of meningioma  Date/time: 10/28/24 0730 . Arrive on [address] at 0530  Surgeon: Dr. Thaddeus Fox  Location: [facility]  3rd Floor ([address]), [address] [place] [address], [phone]            Pre- and Post-Surgery Appointments     Preoperative Assessment Center (PAC)  At this appointment, a provider will review your health history and medications, and will explain how to prepare for surgery.   Date/time: Your PCP Office  Location: 5th [facility], [address] [place] [address]  Provider: Your Primary Care Office with in 30 days of Procedure.         Pre-Surgery Lab Tests   Date/time: to be determined by your PCP  Location:         Post-Surgery Follow-up  Date/time: 11/5/24  1:30 PM IN PERSON  Location: 3rd [facility], [address] [place] [address]  Provider: Lorna CONTI           Preparing for Surgery     Medications   7 days before surgery: Stop aspirin, fish oil (omega-3 fatty acids), multivitamins and other supplements.     4 days before surgery: Stop naproxen. You may take acetaminophen (Tylenol) if you need something for pain relief prior to surgery.      24 hours before surgery: Stop ibuprofen. You may take acetaminophen (Tylenol) if you need something for pain relief prior to surgery.      *Please follow any additional medication instructions given at your PCP pre operative  appointment. Be sure to follow those instructions as well.          Eating and Drinking Before Surgery Arrival Time  8 hours before surgery arrival time: Stop eating solid food and dairy products.   You may have clear liquids until 2 hours before arrival time. Examples of clear liquids: water, broth, clear juice (like apple juice), clear soda (7-Up, Sprite), black coffee or plain tea, Gatorade. Do not drink milk or other dairy products.      2 hours before arrival time: Stop drinking clear liquids.         Preoperative Showers   You will need to purchase a 4-ounce bottle of antibacterial soap such as Hibiclens or ScrubCare at your local pharmacy. If your pharmacy doesn't carry one of these brands, the pharmacist can recommend a substitute.     The night before surgery, take a shower using your regular soap and shampoo. Before getting out of the shower, use half the bottle of the antibacterial soap from the neck down. Let it sit on your skin for 1-2 minutes before rinsing. Use clean towels, clean pajamas and clean sheets on your bed. Do not use lotion, powder, fragrance or deodorant.      The day of surgery, repeat the above shower process. Wear clean clothes to the hospital. Do not use lotion, powder, fragrance or deodorant.         *If you become ill or have a fever within a few days before your surgery, let your provider know right away.   *Do not drink alcohol or use cannabis products 24 hours before or after surgery, or if you are taking narcotic pain medication.  *Remove all jewelry and leave other valuables at home.   *Bring your insurance card and identification with you on the day of surgery.   *You must have a responsible adult who can drive you to and from surgery, and who can stay with you for the first 24 hours after your discharge from the hospital.

## 2024-07-10 NOTE — PROGRESS NOTES
Virtual Visit Details  Date of service: 7/10/2024  Type of service:  Telephone Visit   Phone call duration: 25 minutes   Originating Location (pt. Location): Home    Distant Location (provider location):  On-site    Paresh Alvarado MD  [facility]  [address] [place]  [address]     RE:  Vinh Mccarty  MRN:  [id number]  :  1955     Dear Dr. Alvarado:    We spoke to Mr. Mccarty as part of a telephone follow-up in cranial neurosurgery clinic today.  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    At his recent 's license renewal, the vision exam suggested worsening, and he now has a restricted license.  For the most part, he is independent and feels well otherwise.  Specifically, he denies any problems with balance or hearing.  He denies any facial pain or arm sensory changes.    Over the phone, his speech and language and phonation were normal.    We reviewed the MRI from 2024 and compared it to the past few studies.  There is no obvious residual or recurrence of the resected right clinoidal meningioma.  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  There is mild increase in the size of the right petrous ridge meningioma with mild compression of the right ventral lateral pedro.  The origin of the right trigeminal nerve also appears compressed.  There is no edema in the brainstem.  The tumor measures about 15 mm in maximum dimension and has grown considerably compared to the studies dating back to 2019.    Given the current size of the tumor, its continued growth, and his good life expectancy, we believe treatment is indicated.  We discussed surgery and radiosurgery for this tumor.  We went over the advantages and disadvantages of each option.  If he is open to the idea of an additional craniotomy, we believe this is the best option.  If not, then radiosurgery is the best option.   He seemed to have good understanding and we will check in with him later this month to answer any additional questions.  We will keep you informed of his progress.  Please not hesitate contact us with questions.    Sincerely,        Matthew Fox MD  Department of Neurosurgery

## 2024-07-10 NOTE — LETTER
7/10/2024       RE: Vinh Mccarty  [address]  [place] [address]     Dear Vinh,     Below you will find a schedule of appointments and information about preparing for surgery. Please review the information and let me know what questions you have.      Thank you,  Laurie Greer,  RN, BSN  Neurosurgery Care Coordinator  [facility] Neurosurgery  [phone]        Surgery     Surgery: Right Retrosigmoid approach for resection of meningioma  Date/time: 10/28/24 0730 . Arrive on [address] at 0530  Surgeon: Dr. Thaddeus Fox  Location: [facility]  3rd Floor ([address]), [address] [place] [address], [phone]            Pre- and Post-Surgery Appointments     Preoperative Assessment Center (PAC)  At this appointment, a provider will review your health history and medications, and will explain how to prepare for surgery.   Date/time: Your PCP Office  Location: 5th [facility], [address] [place] [address]  Provider: Your Primary Care Office with in 30 days of Procedure.         Pre-Surgery Lab Tests   Date/time: to be determined by your PCP  Location:         Post-Surgery Follow-up  Date/time: 11/5/24  1:30 PM IN PERSON  Location: [address] [place] [address], [address] [place] [address]  Provider: Lorna CONTI           Preparing for Surgery     Medications   7 days before surgery: Stop aspirin, fish oil (omega-3 fatty acids), multivitamins and other supplements.     4 days before surgery: Stop naproxen. You may take acetaminophen (Tylenol) if you need something for pain relief prior to surgery.      24 hours before surgery: Stop ibuprofen. You may take acetaminophen (Tylenol) if you need something for pain relief prior to surgery.      *Please follow any additional medication instructions given at your PCP pre operative  appointment. Be sure to follow those instructions as well.          Eating and Drinking Before Surgery Arrival Time  8 hours before surgery arrival time: Stop eating solid food and dairy products.   You may have clear liquids until 2 hours before arrival time. Examples of clear liquids: water, broth, clear juice (like apple juice), clear soda (7-Up, Sprite), black coffee or plain tea, Gatorade. Do not drink milk or other dairy products.      2 hours before arrival time: Stop drinking clear liquids.         Preoperative Showers   You will need to purchase a 4-ounce bottle of antibacterial soap such as Hibiclens or ScrubCare at your local pharmacy. If your pharmacy doesn't carry one of these brands, the pharmacist can recommend a substitute.     The night before surgery, take a shower using your regular soap and shampoo. Before getting out of the shower, use half the bottle of the antibacterial soap from the neck down. Let it sit on your skin for 1-2 minutes before rinsing. Use clean towels, clean pajamas and clean sheets on your bed. Do not use lotion, powder, fragrance or deodorant.      The day of surgery, repeat the above shower process. Wear clean clothes to the hospital. Do not use lotion, powder, fragrance or deodorant.         *If you become ill or have a fever within a few days before your surgery, let your provider know right away.   *Do not drink alcohol or use cannabis products 24 hours before or after surgery, or if you are taking narcotic pain medication.  *Remove all jewelry and leave other valuables at home.   *Bring your insurance card and identification with you on the day of surgery.   *You must have a responsible adult who can drive you to and from surgery, and who can stay with you for the first 24 hours after your discharge from the hospital.

## 2024-07-10 NOTE — NURSING NOTE
Current patient location: [address] [place] [address] RD  [facility] [address]    Is the patient currently in the state of [place]? YES    Visit mode:TELEPHONE    If the visit is dropped, the patient can be reconnected by: TELEPHONE VISIT: Phone number: [phone]    Will anyone else be joining the visit? NO  (If patient encounters technical issues they should call [phone]    How would you like to obtain your AVS? Mail a copy    Are changes needed to the allergy or medication list? No    Are refills needed on medications prescribed by this physician? NO    Reason for visit: Consult    Bela VALENCIA

## 2024-07-25 ENCOUNTER — TELEPHONE (OUTPATIENT)
Dept: NEUROSURGERY | Facility: CLINIC | Age: 69
End: 2024-07-25
Payer: COMMERCIAL

## 2024-07-25 NOTE — TELEPHONE ENCOUNTER
Outbound call to patient asking if he had any questions regarding treatment options for Dr Fox.  Please call Laurie GARCIA   Thank you

## 2024-08-14 ENCOUNTER — TELEPHONE (OUTPATIENT)
Dept: NEUROSURGERY | Facility: CLINIC | Age: 69
End: 2024-08-14
Payer: COMMERCIAL

## 2024-08-16 NOTE — TELEPHONE ENCOUNTER
Spoke with Vinh,   Vinh states he has decided on the procedure with Dr Fox, wanting surgery end of October time frame.   States he needs to get things completed around home prior to procedure and ready for winter.    He will also need to find transportation , lives about 3 hours away.    Explained Dr Fox will write a surgery request, then the hospital scheduling will call with date/time.   Patient will need a pre op at PCP office within 30 days of procedure.      Patient voices understanding.  Note sent to Dr Fox.

## 2024-09-13 DIAGNOSIS — D32.0 BENIGN NEOPLASM OF CEREBRAL MENINGES (H): Primary | ICD-10-CM

## 2024-09-17 ENCOUNTER — TELEPHONE (OUTPATIENT)
Dept: NEUROSURGERY | Facility: CLINIC | Age: 69
End: 2024-09-17
Payer: COMMERCIAL

## 2024-09-17 NOTE — TELEPHONE ENCOUNTER
I called patient in regards to scheduling surgery with Dr. Fox, but patient would like to call back when he's talked to over dates with family.      Torsten Shine on 9/17/2024 at 10:47 AM

## 2024-09-18 NOTE — TELEPHONE ENCOUNTER
Patient called back  to schedule surgery with Dr. Thaddeus Fox    Spoke with:  Vinh (patient)    Date of Surgery: 10/28/2024    Estimated Arrival time Discussed with Patient:  No    Location of surgery: [facility]     Pre-Op H&P: Complete with PCP     Post-Op Appts: 11/15/2024     Imaging:  No       Discussed with patient pre-op RN will call 2-3 days prior to surgery with arrival time and instructions:  Yes     Packet sent out: No 09/18/24  via [employer]    Vendor/Rep/Monitoring:   Yes via Online Portal Confirmation #:     Additional Comments:      All patients questions were answered and patient was instructed to review surgical packet and call back with any questions or concerns.       Torsten Shine on 9/18/2024 at 10:03 AM

## 2024-09-24 ENCOUNTER — TELEPHONE (OUTPATIENT)
Dept: NEUROSURGERY | Facility: CLINIC | Age: 69
End: 2024-09-24
Payer: COMMERCIAL

## 2024-09-24 NOTE — TELEPHONE ENCOUNTER
Dear Vinh,    Below you will find a schedule of appointments and information about preparing for surgery. Please review the information and let me know what questions you have.     Thank you,  Laurie Greer,  RN, BSN  Neurosurgery Care Coordinator  [facility] Neurosurgery  [phone]      Surgery    Surgery: Right Retrosigmoid approach for resection of meningioma  Date/time: 10/28/24 0730 . Arrive on [address] at 0530  Surgeon: Dr. Thaddeus Fox  Location: [facility]  3rd Floor ([address]), [address] [place] [address], [phone]         Pre- and Post-Surgery Appointments    Preoperative Assessment Center (PAC)  At this appointment, a provider will review your health history and medications, and will explain how to prepare for surgery.   Date/time: Your PCP Office  Location: 5th [facility], [address] [place] [address]  Provider: Your Primary Care Office with in 30 days of Procedure.       Pre-Surgery Lab Tests   Date/time: to be determined by your PCP  Location:       Post-Surgery Follow-up  Date/time: 11/5/24  1:30 PM IN PERSON  Location: 3rd [facility], [address] [place] [address]  Provider: Lorna CONTI        Preparing for Surgery    Medications   7 days before surgery: Stop aspirin, fish oil (omega-3 fatty acids), multivitamins and other supplements.    4 days before surgery: Stop naproxen. You may take acetaminophen (Tylenol) if you need something for pain relief prior to surgery.     24 hours before surgery: Stop ibuprofen. You may take acetaminophen (Tylenol) if you need something for pain relief prior to surgery.     *Please follow any additional medication instructions given at your PCP pre operative  appointment. Be sure to follow those instructions as well.       Eating and Drinking Before Surgery Arrival Time  8 hours before surgery arrival time: Stop eating  solid food and dairy products.   You may have clear liquids until 2 hours before arrival time. Examples of clear liquids: water, broth, clear juice (like apple juice), clear soda (7-Up, Sprite), black coffee or plain tea, Gatorade. Do not drink milk or other dairy products.     2 hours before arrival time: Stop drinking clear liquids.       Preoperative Showers   You will need to purchase a 4-ounce bottle of antibacterial soap such as Hibiclens or ScrubCare at your local pharmacy. If your pharmacy doesn't carry one of these brands, the pharmacist can recommend a substitute.    The night before surgery, take a shower using your regular soap and shampoo. Before getting out of the shower, use half the bottle of the antibacterial soap from the neck down. Let it sit on your skin for 1-2 minutes before rinsing. Use clean towels, clean pajamas and clean sheets on your bed. Do not use lotion, powder, fragrance or deodorant.     The day of surgery, repeat the above shower process. Wear clean clothes to the hospital. Do not use lotion, powder, fragrance or deodorant.       *If you become ill or have a fever within a few days before your surgery, let your provider know right away.   *Do not drink alcohol or use cannabis products 24 hours before or after surgery, or if you are taking narcotic pain medication.  *Remove all jewelry and leave other valuables at home.   *Bring your insurance card and identification with you on the day of surgery.   *You must have a responsible adult who can drive you to and from surgery, and who can stay with you for the first 24 hours after your discharge from the hospital.

## 2024-09-24 NOTE — TELEPHONE ENCOUNTER
RT Instructions for procedure.     ----- Message from Matthew Fox sent at 9/13/2024  9:54 AM CDT -----  Regarding: case request for late October  Dear Team,    Case request placed for this patient. Last week of October if that works for patient.  H&P can be done locally or a virtual PAC visit is fine too. No imaging needed  Thanks    RT

## 2024-10-01 ENCOUNTER — CARE COORDINATION (OUTPATIENT)
Dept: NEUROSURGERY | Facility: CLINIC | Age: 69
End: 2024-10-01
Payer: COMMERCIAL

## 2024-10-02 ENCOUNTER — TELEPHONE (OUTPATIENT)
Dept: NEUROSURGERY | Facility: CLINIC | Age: 69
End: 2024-10-02
Payer: COMMERCIAL

## 2024-10-02 NOTE — TELEPHONE ENCOUNTER
Reviewed information with Vinh during a Telephone call.        Dear Vinh,    Below you will find a schedule of appointments and information about preparing for surgery. Please review the information and let me know what questions you have.     Thank you,  Laurie Greer, RN, BSN  Neurosurgery Care Coordinator  [facility]  [phone]      Surgery    Surgery: Right retrosigmoid approach for resection of meningioma,  Date/time: 10/28 0730.  Arrive on [address] at 0530 AM.    Surgeon: Dr. JAVAN Fox  Location: [facility]  3rd Floor ([address]), [address] [place] [address], [phone]         Pre- and Post-Surgery Appointments    Preoperative Assessment Center (PAC)  At this appointment, a provider will review your health history and medications, and will explain how to prepare for surgery.   Date/time: Patient requests Primary Care Provider to complete Pre Operative Evaluation  Location:  [facility]  Provider:       Pre-Surgery Lab Tests   Date/time: Ordered by Primary Care As Needed  Location:       Post-Surgery Follow-up  Date/time: 11/15/24 1:30 PM IN PERSON     FYI:  Ask at time of discharge if Primary Care can Remove Sutures and do a Follow Up Telephone call for Post surgery follow up.    Location: 3rd [facility], [address] [place] [address]  Provider: Lorna CONTI        Preparing for Surgery    Medications   7 days before surgery: Stop aspirin, fish oil (omega-3 fatty acids), multivitamins and other supplements.    4 days before surgery: Stop naproxen. You may take acetaminophen (Tylenol) if you need something for pain relief prior to surgery.     24 hours before surgery: Stop ibuprofen. You may take acetaminophen (Tylenol) if you need something for pain relief prior to surgery.     *You will receive additional medication instructions at your PAC appointment. Be sure to follow those instructions as  well.       Eating and Drinking Before Surgery Arrival Time  8 hours before surgery arrival time: Stop eating solid food and dairy products.   You may have clear liquids until 2 hours before arrival time. Examples of clear liquids: water, broth, clear juice (like apple juice), clear soda (7-Up, Sprite), black coffee or plain tea, Gatorade. Do not drink milk or other dairy products.     2 hours before arrival time: Stop drinking clear liquids.       Preoperative Showers   You will need to purchase a 4-ounce bottle of antibacterial soap such as Hibiclens or ScrubCare at your local pharmacy. If your pharmacy doesn't carry one of these brands, the pharmacist can recommend a substitute.    The night before surgery, take a shower using your regular soap and shampoo. Before getting out of the shower, use half the bottle of the antibacterial soap from the neck down. Let it sit on your skin for 1-2 minutes before rinsing. Use clean towels, clean pajamas and clean sheets on your bed. Do not use lotion, powder, fragrance or deodorant.     The day of surgery, repeat the above shower process. Wear clean clothes to the hospital. Do not use lotion, powder, fragrance or deodorant.       *If you become ill or have a fever within a few days before your surgery, let your provider know right away.   *Do not drink alcohol or use cannabis products 24 hours before or after surgery, or if you are taking narcotic pain medication.  *Remove all jewelry and leave other valuables at home.   *Bring your insurance card and identification with you on the day of surgery.   *You must have a responsible adult who can drive you to and from surgery, and who can stay with you for the first 24 hours after your discharge from the hospital.

## 2024-10-18 ENCOUNTER — TRANSFERRED RECORDS (OUTPATIENT)
Dept: MULTI SPECIALTY CLINIC | Facility: CLINIC | Age: 69
End: 2024-10-18
Payer: COMMERCIAL

## 2024-10-18 LAB
CREATININE (EXTERNAL): 0.77 MG/DL (ref 0.8–1.5)
GFR ESTIMATED (EXTERNAL): >90 ML/MIN/1.73M2
GLUCOSE (EXTERNAL): 87 MG/DL (ref 70–100)
HBA1C MFR BLD: 5.4 %
POTASSIUM (EXTERNAL): 4.3 MMOL/L (ref 3.5–5.1)

## 2024-10-25 ENCOUNTER — ANESTHESIA EVENT (OUTPATIENT)
Dept: SURGERY | Facility: CLINIC | Age: 69
DRG: 025 | End: 2024-10-25
Payer: MEDICARE

## 2024-10-25 ENCOUNTER — TELEPHONE (OUTPATIENT)
Dept: NEUROSURGERY | Facility: CLINIC | Age: 69
End: 2024-10-25
Payer: COMMERCIAL

## 2024-10-25 NOTE — TELEPHONE ENCOUNTER
Patient calling scheduled for procedure with Dr Finn on 10/28/24.  Patient takes both Ramipril 10mg and Amlodipine 5mg every morning.  Patient states was called by pre admission nursing and told to hold one of the medications but does not know which medication.  He has called that phone number back but has not received a return call.    IF you do not hear from PAN, hold both medications the morning of surgery.      Patient voices understanding.

## 2024-10-25 NOTE — ANESTHESIA PREPROCEDURE EVALUATION
Anesthesia Pre-Procedure Evaluation    Patient: Vinh Mccarty   MRN: [id number] : 1955        Procedure : Procedure(s):  Right retrosigmoid approach for resection of meningioma  SURGICAL PROCUREMENT, Abdominal FAT GRAFT          Past Medical History:   Diagnosis Date    HTN (hypertension) 10/10/2011    hyperlipidemia 10/10/2011      Past Surgical History:   Procedure Laterality Date    ARTHROSCOPY KNEE      LT; torn menicus knee    bakers cyst / removed      LT    COLONOSCOPY  2011    CRANIOTOMY, EXCISE TUMOR COMPLEX, COMBINED Right 2019    Procedure: Right Frontotemporal Craniotomy And Resection Of Tumor;  Surgeon: Matthew Fox MD;  Location: [facility] OR    ELBOW SURGERY      Removal of pin    GLAUCOMA SURGERY      surgically repaired/pins  1979    rt elbow fx      Allergies   Allergen Reactions    Cats      congestion    Horse Protein      Per allergy testing    Penicillins     Prabhakar Grasses       Social History     Tobacco Use    Smoking status: Never    Smokeless tobacco: Never   Substance Use Topics    Alcohol use: Yes     Comment: socially      Wt Readings from Last 1 Encounters:   07/10/24 77.1 kg (170 lb)        Anesthesia Evaluation            ROS/MED HX  ENT/Pulmonary:     (+) sleep apnea, doesn't use CPAP,                                      Neurologic: Comment: History of meningiomas, resected without sequela      Cardiovascular:     (+) Dyslipidemia hypertension- -   -  - -                                      METS/Exercise Tolerance: >4 METS    Hematologic:  - neg hematologic  ROS     Musculoskeletal:  - neg musculoskeletal ROS     GI/Hepatic:     (+) GERD,                   Renal/Genitourinary:  - neg Renal ROS     Endo:  - neg endo ROS     Psychiatric/Substance Use:  - neg psychiatric ROS     Infectious Disease:  - neg infectious disease ROS     Malignancy:       Other: Comment: Glaucoma - neg other ROS          Physical Exam    Airway        Mallampati: II  "  TM distance: > 3 FB   Neck ROM: full   Mouth opening: > 3 cm    Respiratory Devices and Support         Dental       (+) Minor Abnormalities - some fillings, tiny chips      Cardiovascular   cardiovascular exam normal          Pulmonary   pulmonary exam normal                OUTSIDE LABS:  CBC:   Lab Results   Component Value Date    WBC 6.8 07/25/2019    WBC 7.3 07/24/2019    HGB 14.1 07/25/2019    HGB 15.1 07/24/2019    HCT 41.8 07/25/2019    HCT 46.0 07/24/2019     07/25/2019     07/24/2019     BMP:   Lab Results   Component Value Date     07/25/2019     07/24/2019    POTASSIUM 3.8 07/25/2019    POTASSIUM 3.6 07/24/2019    CHLORIDE 109 07/25/2019    CHLORIDE 111 (H) 07/24/2019    CO2 23 07/25/2019    CO2 26 07/24/2019    BUN 7 07/25/2019    BUN 12 07/24/2019    CR 0.65 (L) 07/25/2019    CR 0.98 07/24/2019     (H) 07/25/2019     (H) 07/24/2019     COAGS: No results found for: \"PTT\", \"INR\", \"FIBR\"  POC:   Lab Results   Component Value Date     (H) 07/26/2019     HEPATIC:   Lab Results   Component Value Date    ALT 34 07/26/2013     OTHER:   Lab Results   Component Value Date    BRAD 8.4 (L) 07/25/2019    PHOS 2.6 07/25/2019    MAG 1.6 07/25/2019    TSH 0.64 05/05/2014       Anesthesia Plan    ASA Status:  2       Anesthesia Type: General.     - Airway: ETT   Induction: Intravenous, Propofol.   Maintenance: Balanced.   Techniques and Equipment:     - Lines/Monitors: 2nd IV, Arterial Line, BIS     - Blood: T&S     Consents    Anesthesia Plan(s) and associated risks, benefits, and realistic alternatives discussed. Questions answered and patient/representative(s) expressed understanding.     - Discussed: Risks, Benefits and Alternatives for BOTH SEDATION and the PROCEDURE were discussed     - Discussed with:       - Extended Intubation/Ventilatory Support Discussed: Yes.      - Patient is DNR/DNI Status: No     Use of blood products discussed: No .     Postoperative " Care    Pain management: Multi-modal analgesia, IV analgesics, Oral pain medications.   PONV prophylaxis: Ondansetron (or other 5HT-3), Dexamethasone or Solumedrol     Comments:               Ulises Cao MD    I have reviewed the pertinent notes and labs in the chart from the past 30 days and (re)examined the patient.  Any updates or changes from those notes are reflected in this note.               # Hypertension: Noted on problem list

## 2024-10-28 ENCOUNTER — ANESTHESIA (OUTPATIENT)
Dept: SURGERY | Facility: CLINIC | Age: 69
DRG: 025 | End: 2024-10-28
Payer: MEDICARE

## 2024-10-28 ENCOUNTER — DOCUMENTATION ONLY (OUTPATIENT)
Dept: NEUROSURGERY | Facility: CLINIC | Age: 69
End: 2024-10-28
Payer: COMMERCIAL

## 2024-10-28 ENCOUNTER — HOSPITAL ENCOUNTER (INPATIENT)
Facility: CLINIC | Age: 69
LOS: 4 days | Discharge: HOME | DRG: 025 | End: 2024-11-01
Attending: NEUROLOGICAL SURGERY | Admitting: NEUROLOGICAL SURGERY
Payer: MEDICARE

## 2024-10-28 DIAGNOSIS — Z98.890 S/P CRANIOTOMY: Primary | ICD-10-CM

## 2024-10-28 DIAGNOSIS — D32.9 MENINGIOMA (H): ICD-10-CM

## 2024-10-28 LAB
ABO/RH(D): NORMAL
ANTIBODY SCREEN: NEGATIVE
GLUCOSE BLDC GLUCOMTR-MCNC: 108 MG/DL (ref 70–99)
GLUCOSE BLDC GLUCOMTR-MCNC: 148 MG/DL (ref 70–99)
SPECIMEN EXPIRATION DATE: NORMAL

## 2024-10-28 PROCEDURE — 250N000013 HC RX MED GY IP 250 OP 250 PS 637

## 2024-10-28 PROCEDURE — 0NU70JZ SUPPLEMENT OCCIPITAL BONE WITH SYNTHETIC SUBSTITUTE, OPEN APPROACH: ICD-10-PCS | Performed by: NEUROLOGICAL SURGERY

## 2024-10-28 PROCEDURE — 258N000003 HC RX IP 258 OP 636: Performed by: NEUROLOGICAL SURGERY

## 2024-10-28 PROCEDURE — 250N000011 HC RX IP 250 OP 636

## 2024-10-28 PROCEDURE — 258N000003 HC RX IP 258 OP 636

## 2024-10-28 PROCEDURE — C1763 CONN TISS, NON-HUMAN: HCPCS | Performed by: NEUROLOGICAL SURGERY

## 2024-10-28 PROCEDURE — 250N000011 HC RX IP 250 OP 636: Mod: JZ | Performed by: NURSE PRACTITIONER

## 2024-10-28 PROCEDURE — 88307 TISSUE EXAM BY PATHOLOGIST: CPT | Mod: 26 | Performed by: SPECIALIST

## 2024-10-28 PROCEDURE — 00B70ZZ EXCISION OF CEREBRAL HEMISPHERE, OPEN APPROACH: ICD-10-PCS | Performed by: NEUROLOGICAL SURGERY

## 2024-10-28 PROCEDURE — 61510 CRNEC TREPH EXC BRN TUM STTL: CPT | Performed by: ANESTHESIOLOGY

## 2024-10-28 PROCEDURE — 999N000141 HC STATISTIC PRE-PROCEDURE NURSING ASSESSMENT: Performed by: NEUROLOGICAL SURGERY

## 2024-10-28 PROCEDURE — 250N000025 HC SEVOFLURANE, PER MIN: Performed by: NEUROLOGICAL SURGERY

## 2024-10-28 PROCEDURE — C1713 ANCHOR/SCREW BN/BN,TIS/BN: HCPCS | Performed by: NEUROLOGICAL SURGERY

## 2024-10-28 PROCEDURE — 86900 BLOOD TYPING SEROLOGIC ABO: CPT | Performed by: NEUROLOGICAL SURGERY

## 2024-10-28 PROCEDURE — 710N000011 HC RECOVERY PHASE 1, LEVEL 3, PER MIN: Performed by: NEUROLOGICAL SURGERY

## 2024-10-28 PROCEDURE — 200N000002 HC R&B ICU UMMC

## 2024-10-28 PROCEDURE — 272N000004 HC RX 272: Performed by: NEUROLOGICAL SURGERY

## 2024-10-28 PROCEDURE — 370N000017 HC ANESTHESIA TECHNICAL FEE, PER MIN: Performed by: NEUROLOGICAL SURGERY

## 2024-10-28 PROCEDURE — 250N000009 HC RX 250: Performed by: NEUROLOGICAL SURGERY

## 2024-10-28 PROCEDURE — 36620 INSERTION CATHETER ARTERY: CPT | Mod: 59 | Performed by: ANESTHESIOLOGY

## 2024-10-28 PROCEDURE — 250N000009 HC RX 250

## 2024-10-28 PROCEDURE — 86850 RBC ANTIBODY SCREEN: CPT | Performed by: NEUROLOGICAL SURGERY

## 2024-10-28 PROCEDURE — 0JB80ZZ EXCISION OF ABDOMEN SUBCUTANEOUS TISSUE AND FASCIA, OPEN APPROACH: ICD-10-PCS | Performed by: NEUROLOGICAL SURGERY

## 2024-10-28 PROCEDURE — 272N000001 HC OR GENERAL SUPPLY STERILE: Performed by: NEUROLOGICAL SURGERY

## 2024-10-28 PROCEDURE — 250N000012 HC RX MED GY IP 250 OP 636 PS 637

## 2024-10-28 PROCEDURE — 360N000079 HC SURGERY LEVEL 6, PER MIN: Performed by: NEUROLOGICAL SURGERY

## 2024-10-28 PROCEDURE — 88307 TISSUE EXAM BY PATHOLOGIST: CPT | Mod: TC | Performed by: NEUROLOGICAL SURGERY

## 2024-10-28 DEVICE — GRAFT DURAGEN 2X2" ID220: Type: IMPLANTABLE DEVICE | Site: CRANIAL | Status: FUNCTIONAL

## 2024-10-28 DEVICE — IMPLANTABLE DEVICE: Type: IMPLANTABLE DEVICE | Site: CRANIAL | Status: FUNCTIONAL

## 2024-10-28 DEVICE — IMP MESH SYN MATRIX NEURO CONTOUR 38X45MM 04.503.081: Type: IMPLANTABLE DEVICE | Site: CRANIAL | Status: FUNCTIONAL

## 2024-10-28 DEVICE — IMP SCR SYN MATRIX LOW PRO 1.5X04MM SELF DRILL 04.503.104.01: Type: IMPLANTABLE DEVICE | Site: CRANIAL | Status: FUNCTIONAL

## 2024-10-28 RX ORDER — ONDANSETRON 2 MG/ML
INJECTION INTRAMUSCULAR; INTRAVENOUS PRN
Status: DISCONTINUED | OUTPATIENT
Start: 2024-10-28 | End: 2024-10-28

## 2024-10-28 RX ORDER — FENTANYL CITRATE 50 UG/ML
25 INJECTION, SOLUTION INTRAMUSCULAR; INTRAVENOUS EVERY 5 MIN PRN
Status: DISCONTINUED | OUTPATIENT
Start: 2024-10-28 | End: 2024-10-28 | Stop reason: HOSPADM

## 2024-10-28 RX ORDER — SODIUM CHLORIDE, SODIUM LACTATE, POTASSIUM CHLORIDE, CALCIUM CHLORIDE 600; 310; 30; 20 MG/100ML; MG/100ML; MG/100ML; MG/100ML
INJECTION, SOLUTION INTRAVENOUS CONTINUOUS PRN
Status: DISCONTINUED | OUTPATIENT
Start: 2024-10-28 | End: 2024-10-28

## 2024-10-28 RX ORDER — FENTANYL CITRATE 50 UG/ML
INJECTION, SOLUTION INTRAMUSCULAR; INTRAVENOUS PRN
Status: DISCONTINUED | OUTPATIENT
Start: 2024-10-28 | End: 2024-10-28

## 2024-10-28 RX ORDER — OXYCODONE HYDROCHLORIDE 5 MG/1
5 TABLET ORAL EVERY 4 HOURS PRN
Status: DISCONTINUED | OUTPATIENT
Start: 2024-10-28 | End: 2024-11-01 | Stop reason: HOSPADM

## 2024-10-28 RX ORDER — DEXAMETHASONE SODIUM PHOSPHATE 4 MG/ML
INJECTION, SOLUTION INTRA-ARTICULAR; INTRALESIONAL; INTRAMUSCULAR; INTRAVENOUS; SOFT TISSUE PRN
Status: DISCONTINUED | OUTPATIENT
Start: 2024-10-28 | End: 2024-10-28

## 2024-10-28 RX ORDER — DEXAMETHASONE SODIUM PHOSPHATE 4 MG/ML
4 INJECTION, SOLUTION INTRA-ARTICULAR; INTRALESIONAL; INTRAMUSCULAR; INTRAVENOUS; SOFT TISSUE
Status: DISCONTINUED | OUTPATIENT
Start: 2024-10-28 | End: 2024-10-28 | Stop reason: HOSPADM

## 2024-10-28 RX ORDER — PROCHLORPERAZINE 25 MG
12.5 SUPPOSITORY, RECTAL RECTAL EVERY 12 HOURS PRN
Status: DISCONTINUED | OUTPATIENT
Start: 2024-10-28 | End: 2024-11-01 | Stop reason: HOSPADM

## 2024-10-28 RX ORDER — LIDOCAINE HYDROCHLORIDE 20 MG/ML
INJECTION, SOLUTION INFILTRATION; PERINEURAL PRN
Status: DISCONTINUED | OUTPATIENT
Start: 2024-10-28 | End: 2024-10-28

## 2024-10-28 RX ORDER — DEXAMETHASONE 4 MG/1
4 TABLET ORAL DAILY
Status: COMPLETED | OUTPATIENT
Start: 2024-10-31 | End: 2024-10-31

## 2024-10-28 RX ORDER — BRINZOLAMIDE 10 MG/ML
1 SUSPENSION/ DROPS OPHTHALMIC EVERY MORNING
Status: DISCONTINUED | OUTPATIENT
Start: 2024-10-29 | End: 2024-10-28

## 2024-10-28 RX ORDER — ONDANSETRON 2 MG/ML
4 INJECTION INTRAMUSCULAR; INTRAVENOUS EVERY 30 MIN PRN
Status: DISCONTINUED | OUTPATIENT
Start: 2024-10-28 | End: 2024-10-28 | Stop reason: HOSPADM

## 2024-10-28 RX ORDER — LIDOCAINE 40 MG/G
CREAM TOPICAL
Status: DISCONTINUED | OUTPATIENT
Start: 2024-10-28 | End: 2024-10-28 | Stop reason: HOSPADM

## 2024-10-28 RX ORDER — NALOXONE HYDROCHLORIDE 0.4 MG/ML
0.1 INJECTION, SOLUTION INTRAMUSCULAR; INTRAVENOUS; SUBCUTANEOUS
Status: DISCONTINUED | OUTPATIENT
Start: 2024-10-28 | End: 2024-10-28 | Stop reason: HOSPADM

## 2024-10-28 RX ORDER — HYDROMORPHONE HCL IN WATER/PF 6 MG/30 ML
0.2 PATIENT CONTROLLED ANALGESIA SYRINGE INTRAVENOUS EVERY 5 MIN PRN
Status: DISCONTINUED | OUTPATIENT
Start: 2024-10-28 | End: 2024-10-28 | Stop reason: HOSPADM

## 2024-10-28 RX ORDER — HYDRALAZINE HYDROCHLORIDE 20 MG/ML
INJECTION INTRAMUSCULAR; INTRAVENOUS PRN
Status: DISCONTINUED | OUTPATIENT
Start: 2024-10-28 | End: 2024-10-28

## 2024-10-28 RX ORDER — SODIUM CHLORIDE 9 MG/ML
INJECTION, SOLUTION INTRAVENOUS CONTINUOUS PRN
Status: DISCONTINUED | OUTPATIENT
Start: 2024-10-28 | End: 2024-10-28

## 2024-10-28 RX ORDER — CLINDAMYCIN PHOSPHATE 900 MG/50ML
900 INJECTION, SOLUTION INTRAVENOUS
Status: COMPLETED | OUTPATIENT
Start: 2024-10-28 | End: 2024-10-28

## 2024-10-28 RX ORDER — RAMIPRIL 10 MG/1
10 CAPSULE ORAL DAILY
Status: DISCONTINUED | OUTPATIENT
Start: 2024-10-29 | End: 2024-11-01 | Stop reason: HOSPADM

## 2024-10-28 RX ORDER — DEXAMETHASONE 2 MG/1
2 TABLET ORAL DAILY
Status: COMPLETED | OUTPATIENT
Start: 2024-11-01 | End: 2024-11-01

## 2024-10-28 RX ORDER — PROPOFOL 10 MG/ML
INJECTION, EMULSION INTRAVENOUS PRN
Status: DISCONTINUED | OUTPATIENT
Start: 2024-10-28 | End: 2024-10-28

## 2024-10-28 RX ORDER — LABETALOL HYDROCHLORIDE 5 MG/ML
10-20 INJECTION, SOLUTION INTRAVENOUS EVERY 10 MIN PRN
Status: DISCONTINUED | OUTPATIENT
Start: 2024-10-28 | End: 2024-11-01 | Stop reason: HOSPADM

## 2024-10-28 RX ORDER — HYDROMORPHONE HCL IN WATER/PF 6 MG/30 ML
0.2 PATIENT CONTROLLED ANALGESIA SYRINGE INTRAVENOUS
Status: DISCONTINUED | OUTPATIENT
Start: 2024-10-28 | End: 2024-10-31

## 2024-10-28 RX ORDER — LATANOPROST 50 UG/ML
1 SOLUTION/ DROPS OPHTHALMIC AT BEDTIME
Status: DISCONTINUED | OUTPATIENT
Start: 2024-10-28 | End: 2024-10-28

## 2024-10-28 RX ORDER — AMLODIPINE BESYLATE 5 MG/1
5 TABLET ORAL DAILY
Status: DISCONTINUED | OUTPATIENT
Start: 2024-10-29 | End: 2024-11-01 | Stop reason: HOSPADM

## 2024-10-28 RX ORDER — ONDANSETRON 4 MG/1
4 TABLET, ORALLY DISINTEGRATING ORAL EVERY 6 HOURS PRN
Status: CANCELLED | OUTPATIENT
Start: 2024-10-28

## 2024-10-28 RX ORDER — AMOXICILLIN 250 MG
1 CAPSULE ORAL 2 TIMES DAILY
Status: DISCONTINUED | OUTPATIENT
Start: 2024-10-28 | End: 2024-11-01 | Stop reason: HOSPADM

## 2024-10-28 RX ORDER — ROSUVASTATIN CALCIUM 10 MG/1
10 TABLET, COATED ORAL DAILY
Status: DISCONTINUED | OUTPATIENT
Start: 2024-10-29 | End: 2024-11-01 | Stop reason: HOSPADM

## 2024-10-28 RX ORDER — DEXAMETHASONE 4 MG/1
4 TABLET ORAL 2 TIMES DAILY
Status: COMPLETED | OUTPATIENT
Start: 2024-10-29 | End: 2024-10-30

## 2024-10-28 RX ORDER — PROMETHAZINE HYDROCHLORIDE 25 MG/ML
12.5 INJECTION INTRAMUSCULAR; INTRAVENOUS EVERY 6 HOURS
Status: CANCELLED | OUTPATIENT
Start: 2024-10-28 | End: 2024-10-30

## 2024-10-28 RX ORDER — OXYCODONE HYDROCHLORIDE 10 MG/1
10 TABLET ORAL EVERY 4 HOURS PRN
Status: DISCONTINUED | OUTPATIENT
Start: 2024-10-28 | End: 2024-11-01 | Stop reason: HOSPADM

## 2024-10-28 RX ORDER — ONDANSETRON 4 MG/1
4 TABLET, ORALLY DISINTEGRATING ORAL EVERY 30 MIN PRN
Status: DISCONTINUED | OUTPATIENT
Start: 2024-10-28 | End: 2024-10-28 | Stop reason: HOSPADM

## 2024-10-28 RX ORDER — METHOCARBAMOL 500 MG/1
500 TABLET, FILM COATED ORAL EVERY 6 HOURS PRN
Status: DISCONTINUED | OUTPATIENT
Start: 2024-10-28 | End: 2024-11-01 | Stop reason: HOSPADM

## 2024-10-28 RX ORDER — POLYETHYLENE GLYCOL 3350 17 G/17G
17 POWDER, FOR SOLUTION ORAL DAILY
Status: DISCONTINUED | OUTPATIENT
Start: 2024-10-29 | End: 2024-11-01 | Stop reason: HOSPADM

## 2024-10-28 RX ORDER — ONDANSETRON 4 MG/1
4 TABLET, ORALLY DISINTEGRATING ORAL EVERY 6 HOURS
Status: COMPLETED | OUTPATIENT
Start: 2024-10-28 | End: 2024-10-30

## 2024-10-28 RX ORDER — HYDRALAZINE HYDROCHLORIDE 20 MG/ML
10-20 INJECTION INTRAMUSCULAR; INTRAVENOUS EVERY 30 MIN PRN
Status: DISCONTINUED | OUTPATIENT
Start: 2024-10-28 | End: 2024-11-01 | Stop reason: HOSPADM

## 2024-10-28 RX ORDER — DEXAMETHASONE 4 MG/1
4 TABLET ORAL 3 TIMES DAILY
Status: COMPLETED | OUTPATIENT
Start: 2024-10-28 | End: 2024-10-29

## 2024-10-28 RX ORDER — PROPOFOL 10 MG/ML
INJECTION, EMULSION INTRAVENOUS CONTINUOUS PRN
Status: DISCONTINUED | OUTPATIENT
Start: 2024-10-28 | End: 2024-10-28

## 2024-10-28 RX ORDER — SODIUM CHLORIDE 9 MG/ML
INJECTION, SOLUTION INTRAVENOUS CONTINUOUS
Status: ACTIVE | OUTPATIENT
Start: 2024-10-28 | End: 2024-10-29

## 2024-10-28 RX ORDER — PROCHLORPERAZINE MALEATE 5 MG/1
5 TABLET ORAL EVERY 6 HOURS PRN
Status: DISCONTINUED | OUTPATIENT
Start: 2024-10-28 | End: 2024-11-01 | Stop reason: HOSPADM

## 2024-10-28 RX ORDER — ACETAMINOPHEN 325 MG/1
975 TABLET ORAL ONCE
Status: COMPLETED | OUTPATIENT
Start: 2024-10-28 | End: 2024-10-28

## 2024-10-28 RX ORDER — LIDOCAINE 40 MG/G
CREAM TOPICAL
Status: DISCONTINUED | OUTPATIENT
Start: 2024-10-28 | End: 2024-11-01 | Stop reason: HOSPADM

## 2024-10-28 RX ORDER — PANTOPRAZOLE SODIUM 40 MG/1
40 TABLET, DELAYED RELEASE ORAL DAILY PRN
Status: DISCONTINUED | OUTPATIENT
Start: 2024-10-28 | End: 2024-10-31

## 2024-10-28 RX ORDER — BISACODYL 10 MG
10 SUPPOSITORY, RECTAL RECTAL DAILY PRN
Status: DISCONTINUED | OUTPATIENT
Start: 2024-10-31 | End: 2024-11-01 | Stop reason: HOSPADM

## 2024-10-28 RX ORDER — SODIUM CHLORIDE, SODIUM LACTATE, POTASSIUM CHLORIDE, AND CALCIUM CHLORIDE .6; .31; .03; .02 G/100ML; G/100ML; G/100ML; G/100ML
IRRIGANT IRRIGATION PRN
Status: DISCONTINUED | OUTPATIENT
Start: 2024-10-28 | End: 2024-10-28 | Stop reason: HOSPADM

## 2024-10-28 RX ORDER — HYDROMORPHONE HCL IN WATER/PF 6 MG/30 ML
0.4 PATIENT CONTROLLED ANALGESIA SYRINGE INTRAVENOUS EVERY 5 MIN PRN
Status: DISCONTINUED | OUTPATIENT
Start: 2024-10-28 | End: 2024-10-28 | Stop reason: HOSPADM

## 2024-10-28 RX ORDER — HYDROMORPHONE HCL IN WATER/PF 6 MG/30 ML
0.4 PATIENT CONTROLLED ANALGESIA SYRINGE INTRAVENOUS
Status: DISCONTINUED | OUTPATIENT
Start: 2024-10-28 | End: 2024-10-31

## 2024-10-28 RX ORDER — FENTANYL CITRATE 50 UG/ML
50 INJECTION, SOLUTION INTRAMUSCULAR; INTRAVENOUS EVERY 5 MIN PRN
Status: DISCONTINUED | OUTPATIENT
Start: 2024-10-28 | End: 2024-10-28 | Stop reason: HOSPADM

## 2024-10-28 RX ORDER — ACETAMINOPHEN 325 MG/1
975 TABLET ORAL EVERY 8 HOURS
Status: COMPLETED | OUTPATIENT
Start: 2024-10-28 | End: 2024-10-31

## 2024-10-28 RX ORDER — ACETAMINOPHEN 325 MG/1
650 TABLET ORAL EVERY 4 HOURS PRN
Status: DISCONTINUED | OUTPATIENT
Start: 2024-10-31 | End: 2024-11-01 | Stop reason: HOSPADM

## 2024-10-28 RX ORDER — CLINDAMYCIN PHOSPHATE 900 MG/50ML
900 INJECTION, SOLUTION INTRAVENOUS SEE ADMIN INSTRUCTIONS
Status: DISCONTINUED | OUTPATIENT
Start: 2024-10-28 | End: 2024-10-28 | Stop reason: HOSPADM

## 2024-10-28 RX ORDER — BUPIVACAINE HYDROCHLORIDE AND EPINEPHRINE 2.5; 5 MG/ML; UG/ML
INJECTION, SOLUTION EPIDURAL; INFILTRATION; INTRACAUDAL; PERINEURAL PRN
Status: DISCONTINUED | OUTPATIENT
Start: 2024-10-28 | End: 2024-10-28 | Stop reason: HOSPADM

## 2024-10-28 RX ORDER — PROCHLORPERAZINE MALEATE 5 MG/1
5 TABLET ORAL EVERY 6 HOURS PRN
Status: DISCONTINUED | OUTPATIENT
Start: 2024-10-28 | End: 2024-10-28

## 2024-10-28 RX ORDER — ONDANSETRON 2 MG/ML
4 INJECTION INTRAMUSCULAR; INTRAVENOUS EVERY 6 HOURS PRN
Status: CANCELLED | OUTPATIENT
Start: 2024-10-28

## 2024-10-28 RX ORDER — ONDANSETRON 2 MG/ML
4 INJECTION INTRAMUSCULAR; INTRAVENOUS EVERY 6 HOURS
Status: COMPLETED | OUTPATIENT
Start: 2024-10-28 | End: 2024-10-30

## 2024-10-28 RX ADMIN — PROPOFOL 50 MG: 10 INJECTION, EMULSION INTRAVENOUS at 08:02

## 2024-10-28 RX ADMIN — ACETAMINOPHEN 975 MG: 325 TABLET, FILM COATED ORAL at 23:09

## 2024-10-28 RX ADMIN — PHENYLEPHRINE HYDROCHLORIDE 0.3 MCG/KG/MIN: 10 INJECTION INTRAVENOUS at 08:18

## 2024-10-28 RX ADMIN — ONDANSETRON 4 MG: 2 INJECTION INTRAMUSCULAR; INTRAVENOUS at 22:12

## 2024-10-28 RX ADMIN — ONDANSETRON 4 MG: 2 INJECTION INTRAMUSCULAR; INTRAVENOUS at 15:33

## 2024-10-28 RX ADMIN — ONDANSETRON 4 MG: 2 INJECTION INTRAMUSCULAR; INTRAVENOUS at 17:27

## 2024-10-28 RX ADMIN — PHENYLEPHRINE HYDROCHLORIDE 0.1 MCG/KG/MIN: 10 INJECTION INTRAVENOUS at 08:12

## 2024-10-28 RX ADMIN — Medication 50 MG: at 07:36

## 2024-10-28 RX ADMIN — PHENYLEPHRINE HYDROCHLORIDE 0.5 MCG/KG/MIN: 10 INJECTION INTRAVENOUS at 08:32

## 2024-10-28 RX ADMIN — SODIUM CHLORIDE: 0.9 INJECTION, SOLUTION INTRAVENOUS at 09:15

## 2024-10-28 RX ADMIN — PHENYLEPHRINE HYDROCHLORIDE 0.4 MCG/KG/MIN: 10 INJECTION INTRAVENOUS at 08:24

## 2024-10-28 RX ADMIN — LIDOCAINE HYDROCHLORIDE 100 MG: 20 INJECTION, SOLUTION INFILTRATION; PERINEURAL at 07:36

## 2024-10-28 RX ADMIN — PHENYLEPHRINE HYDROCHLORIDE 150 MCG: 10 INJECTION INTRAVENOUS at 08:13

## 2024-10-28 RX ADMIN — PHENYLEPHRINE HYDROCHLORIDE 100 MCG: 10 INJECTION INTRAVENOUS at 08:32

## 2024-10-28 RX ADMIN — PROPOFOL 150 MG: 10 INJECTION, EMULSION INTRAVENOUS at 07:36

## 2024-10-28 RX ADMIN — PROPOFOL 30 MCG/KG/MIN: 10 INJECTION, EMULSION INTRAVENOUS at 08:41

## 2024-10-28 RX ADMIN — DEXAMETHASONE SODIUM PHOSPHATE 8 MG: 4 INJECTION, SOLUTION INTRA-ARTICULAR; INTRALESIONAL; INTRAMUSCULAR; INTRAVENOUS; SOFT TISSUE at 11:06

## 2024-10-28 RX ADMIN — PHENYLEPHRINE HYDROCHLORIDE 50 MCG: 10 INJECTION INTRAVENOUS at 09:04

## 2024-10-28 RX ADMIN — REMIFENTANIL HYDROCHLORIDE 0.1 MCG/KG/MIN: 1 INJECTION, POWDER, LYOPHILIZED, FOR SOLUTION INTRAVENOUS at 07:54

## 2024-10-28 RX ADMIN — DEXAMETHASONE 4 MG: 4 TABLET ORAL at 23:09

## 2024-10-28 RX ADMIN — PROPOFOL 50 MG: 10 INJECTION, EMULSION INTRAVENOUS at 07:48

## 2024-10-28 RX ADMIN — CLINDAMYCIN PHOSPHATE 900 MG: 900 INJECTION, SOLUTION INTRAVENOUS at 12:54

## 2024-10-28 RX ADMIN — FENTANYL CITRATE 25 MCG: 50 INJECTION INTRAMUSCULAR; INTRAVENOUS at 07:59

## 2024-10-28 RX ADMIN — FENTANYL CITRATE 25 MCG: 50 INJECTION, SOLUTION INTRAMUSCULAR; INTRAVENOUS at 15:46

## 2024-10-28 RX ADMIN — SODIUM CHLORIDE: 9 INJECTION, SOLUTION INTRAVENOUS at 14:05

## 2024-10-28 RX ADMIN — PROPOFOL 30 MG: 10 INJECTION, EMULSION INTRAVENOUS at 08:04

## 2024-10-28 RX ADMIN — SODIUM CHLORIDE, SODIUM LACTATE, POTASSIUM CHLORIDE, CALCIUM CHLORIDE: 600; 310; 30; 20 INJECTION, SOLUTION INTRAVENOUS at 07:47

## 2024-10-28 RX ADMIN — FENTANYL CITRATE 100 MCG: 50 INJECTION INTRAMUSCULAR; INTRAVENOUS at 07:36

## 2024-10-28 RX ADMIN — FENTANYL CITRATE 25 MCG: 50 INJECTION, SOLUTION INTRAMUSCULAR; INTRAVENOUS at 15:54

## 2024-10-28 RX ADMIN — HYDRALAZINE HYDROCHLORIDE 10 MG: 20 INJECTION INTRAMUSCULAR; INTRAVENOUS at 15:23

## 2024-10-28 RX ADMIN — HYDROMORPHONE HYDROCHLORIDE 0.2 MG: 0.2 INJECTION, SOLUTION INTRAMUSCULAR; INTRAVENOUS; SUBCUTANEOUS at 16:58

## 2024-10-28 RX ADMIN — PROPOFOL 10 MCG/KG/MIN: 10 INJECTION, EMULSION INTRAVENOUS at 07:51

## 2024-10-28 RX ADMIN — ACETAMINOPHEN 975 MG: 325 TABLET, FILM COATED ORAL at 06:57

## 2024-10-28 RX ADMIN — HYDROMORPHONE HYDROCHLORIDE 0.2 MG: 0.2 INJECTION, SOLUTION INTRAMUSCULAR; INTRAVENOUS; SUBCUTANEOUS at 19:13

## 2024-10-28 RX ADMIN — SODIUM CHLORIDE: 9 INJECTION, SOLUTION INTRAVENOUS at 16:13

## 2024-10-28 RX ADMIN — PROCHLORPERAZINE EDISYLATE 5 MG: 5 INJECTION INTRAMUSCULAR; INTRAVENOUS at 19:13

## 2024-10-28 RX ADMIN — PHENYLEPHRINE HYDROCHLORIDE 50 MCG: 10 INJECTION INTRAVENOUS at 08:53

## 2024-10-28 RX ADMIN — HYDRALAZINE HYDROCHLORIDE 10 MG: 20 INJECTION INTRAMUSCULAR; INTRAVENOUS at 15:32

## 2024-10-28 RX ADMIN — FENTANYL CITRATE 75 MCG: 50 INJECTION INTRAMUSCULAR; INTRAVENOUS at 08:02

## 2024-10-28 RX ADMIN — SODIUM CHLORIDE, POTASSIUM CHLORIDE, SODIUM LACTATE AND CALCIUM CHLORIDE: 600; 310; 30; 20 INJECTION, SOLUTION INTRAVENOUS at 07:27

## 2024-10-28 RX ADMIN — CLINDAMYCIN IN 5 PERCENT DEXTROSE 900 MG: 18 INJECTION, SOLUTION INTRAVENOUS at 06:54

## 2024-10-28 RX ADMIN — PHENYLEPHRINE HYDROCHLORIDE 150 MCG: 10 INJECTION INTRAVENOUS at 08:37

## 2024-10-28 ASSESSMENT — ACTIVITIES OF DAILY LIVING (ADL)
CHANGE_IN_FUNCTIONAL_STATUS_SINCE_ONSET_OF_CURRENT_ILLNESS/INJURY: NO
ADLS_ACUITY_SCORE: 0
ADLS_ACUITY_SCORE: 0
WALKING_OR_CLIMBING_STAIRS_DIFFICULTY: NO
ADLS_ACUITY_SCORE: 0
DIFFICULTY_COMMUNICATING: NO
DOING_ERRANDS_INDEPENDENTLY_DIFFICULTY: NO
ADLS_ACUITY_SCORE: 0
FALL_HISTORY_WITHIN_LAST_SIX_MONTHS: NO
VISION_MANAGEMENT: GLASSES
WEAR_GLASSES_OR_BLIND: YES
TOILETING_ISSUES: NO
ADLS_ACUITY_SCORE: 0
ADLS_ACUITY_SCORE: 0
DRESSING/BATHING_DIFFICULTY: NO
ADLS_ACUITY_SCORE: 0
DIFFICULTY_EATING/SWALLOWING: NO
ADLS_ACUITY_SCORE: 0
HEARING_DIFFICULTY_OR_DEAF: NO
CONCENTRATING,_REMEMBERING_OR_MAKING_DECISIONS_DIFFICULTY: NO
ADLS_ACUITY_SCORE: 0

## 2024-10-28 ASSESSMENT — VISUAL ACUITY
OU: BASELINE;GLASSES

## 2024-10-28 NOTE — PROGRESS NOTES
"Neurosurgery brief pre op note    Mr. Mccarty is a 69 year old man s/p right clinoidal meningioma resection in 2019 after he was found to have vision loss. 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 Although he is asymptomatic from the petrous ridge tumor, surgery is offered.     Physical exam:   Height 1.803 m (5' 11\"), weight 75.3 kg (166 lb 0.1 oz).  NEUROLOGIC:  -- Awake; Alert; oriented x 3  -- Follows commands briskly  -- +repetition, calculation, and naming  -- Speech fluent, spontaneous. No aphasia or dysarthria.  -- no gaze preference. No apparent hemineglect.  Cranial Nerves:  -- visual fields full to confrontation, PERRL 3-2mm bilat and brisk, extraocular movements intact  -- face symmetrical, tongue midline  -- sensory V1-V3 intact bilaterally  -- palate elevates symmetrically, uvula midline  -- hearing grossly intact bilat  -- R eye unable to read eye chart, L 20/100    Right frontal temporal craniotomy incision well healed    Motor:  Normal bulk / tone; no tremor, rigidity, or bradykinesia.    No Pronator Drift     Delt Bi Tri Hand  Hand Flexion/  Extension Iliopsoas Quadriceps Tibialis Anterior EHL Gastroc    C5 C6 C7 C8 C8/T1 L2 L3 L4 L5 S1   R 5 5 5 5 5 5 5 5 5 5   L 5 5 5 5 5 5 5 5 5 5   Sensory:   intact to LT    Reflexes:  negative cruz, negative clonus    OR today  "

## 2024-10-28 NOTE — PROGRESS NOTES
Progress notes from [facility] neeru was sent to be scanned in pt chart    [facility]

## 2024-10-28 NOTE — OR NURSING
"Page sent to on-call neurosurg resident: \"Vinh Mccarty: planned surgery this AM. No antibiotics ordered; appears some pended orders have not been signed for Clindamycin. Could you please review? Thanks, Rayray (preop) [phone]\"  "

## 2024-10-28 NOTE — ANESTHESIA PROCEDURE NOTES
Arterial Line Procedure Note    Pre-Procedure   Staff -        Anesthesiologist:  Brian Zurita MD       Resident/Fellow: Ulises Cao MD       Performed By: resident       Location: OR       Pre-Anesthestic Checklist: patient identified, IV checked, risks and benefits discussed, informed consent, monitors and equipment checked, pre-op evaluation and at physician/surgeon's request  Timeout:       Correct Patient: Yes        Correct Procedure: Yes        Correct Site: Yes        Correct Position: Yes   Line Placement:   This line was placed Post Induction  Procedure   Procedure: arterial line       Laterality: right       Insertion Site: radial.  Sterile Prep        Standard elements of sterile barrier followed       Skin prep: Chloraprep  Insertion/Injection        Technique: ultrasound guided        1. Ultrasound was used to evaluate the access site.       2. Artery evaluated via ultrasound for patency/adequacy.       3. Using real-time ultrasound the needle/catheter was observed entering the artery/vein.       5. The visualized structures were anatomically normal.       6. There were no apparent abnormal pathologic findings.       Catheter Type/Size: 20 G, 12 cm  Narrative        Tegaderm dressing used.       Complications: None apparent,        Arterial waveform: Yes

## 2024-10-28 NOTE — ANESTHESIA PROCEDURE NOTES
Airway       Patient location during procedure: OR  Staff -        Anesthesiologist:  Brian Zurita MD       Resident/Fellow: Ulises Cao MD       Performed By: resident and with residents       Procedure performed by resident/fellow/CRNA in presence of a teaching physician.    Consent for Airway        Urgency: elective  Indications and Patient Condition       Indications for airway management: nicole-procedural       Induction type:intravenous       Mask difficulty assessment: 1 - vent by mask    Final Airway Details       Final airway type: endotracheal airway       Successful airway: ETT - single  Endotracheal Airway Details        ETT size (mm): 8.0       Cuffed: yes       Successful intubation technique: direct laryngoscopy       DL Blade Type: MAC 4       Grade View of Cords: 1       Adjucts: stylet       Position: Right       Measured from: gums/teeth       Secured at (cm): 23       Bite block used: None    Post intubation assessment        Placement verified by: capnometry, equal breath sounds and chest rise        Number of attempts at approach: 1       Number of other approaches attempted: 0       Secured with: tape       Ease of procedure: easy       Dentition: Unchanged

## 2024-10-28 NOTE — BRIEF OP NOTE
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.0]   Post-operative diagnosis * No post-op diagnosis entered *     Procedure: Procedure(s):  Right retrosigmoid approach for resection of meningioma  SURGICAL PROCUREMENT, Abdominal FAT GRAFT   Surgeon(s): Surgeons and Role:     * Matthew Fox MD - Primary     * Malaika Powell MD - Resident - Assisting   Estimated blood loss: * No values recorded between 10/28/2024  8:46 AM and 10/28/2024  3:04 PM *    Specimens: ID Type Source Tests Collected by Time Destination   1 : Right Petrous Ridge Tumor Tissue Brain SURGICAL PATHOLOGY EXAM Matthew Fox MD 10/28/2024  1:37 PM       Findings: Gross total resection of meningioma, stable monitoring signals

## 2024-10-28 NOTE — ANESTHESIA CARE TRANSFER NOTE
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    Anesthesia Type:   General     Note:    Oropharynx: oropharynx clear of all foreign objects and spontaneously breathing  Level of Consciousness: drowsy  Oxygen Supplementation: room air    Independent Airway: airway patency satisfactory and stable  Dentition: dentition unchanged  Vital Signs Stable: post-procedure vital signs reviewed and stable  Report to RN Given: handoff report given  Patient transferred to: PACU    Handoff Report: Identifed the Patient, Identified the Reponsible Provider, Reviewed the pertinent medical history, Discussed the surgical course, Reviewed Intra-OP anesthesia mangement and issues during anesthesia, Set expectations for post-procedure period and Allowed opportunity for questions and acknowledgement of understanding      Vitals:  Vitals Value Taken Time   /88 10/28/24 1535   Temp     Pulse 90 10/28/24 1542   Resp 20 10/28/24 1542   SpO2 100 % 10/28/24 1542   Vitals shown include unfiled device data.    Electronically Signed By: Ulises Cao MD  October 28, 2024  3:43 PM

## 2024-10-28 NOTE — ANESTHESIA POSTPROCEDURE EVALUATION
Patient: Vinh Mccarty    Procedure: Procedure(s):  Right retrosigmoid approach for resection of meningioma  SURGICAL PROCUREMENT, Abdominal FAT GRAFT       Anesthesia Type:  General    Note:  Disposition: Admission   Postop Pain Control: Uneventful            Sign Out: Well controlled pain   PONV:    Neuro/Psych: Uneventful            Sign Out: Acceptable/Baseline neuro status   Airway/Respiratory: Uneventful            Sign Out: Acceptable/Baseline resp. status   CV/Hemodynamics: Uneventful            Sign Out: Acceptable CV status; No obvious hypovolemia; No obvious fluid overload   Other NRE: NONE   DID A NON-ROUTINE EVENT OCCUR? No    Event details/Postop Comments:  Pt w/ hypertension into mid 150s systolic on A-line, but appears to vascillate significantly (potentially positional); normotensive on NIBP; spoke with RN to confer w/ NSGY to determine if they would prefer to follow A-line; ordered nicardipine gtt in the event that they seek tighter BP control in PACU           Last vitals:  Vitals Value Taken Time   /71 10/28/24 1630   Temp 36.9  C (98.4  F) 10/28/24 1530   Pulse 89 10/28/24 1644   Resp 20 10/28/24 1644   SpO2 100 % 10/28/24 1644   Vitals shown include unfiled device data.    Electronically Signed By: Luisito Ross MD  October 28, 2024  4:45 PM

## 2024-10-29 ENCOUNTER — APPOINTMENT (OUTPATIENT)
Dept: MRI IMAGING | Facility: CLINIC | Age: 69
DRG: 025 | End: 2024-10-29
Payer: MEDICARE

## 2024-10-29 ENCOUNTER — APPOINTMENT (OUTPATIENT)
Dept: OCCUPATIONAL THERAPY | Facility: CLINIC | Age: 69
DRG: 025 | End: 2024-10-29
Payer: MEDICARE

## 2024-10-29 LAB
ANION GAP SERPL CALCULATED.3IONS-SCNC: 11 MMOL/L (ref 7–15)
BUN SERPL-MCNC: 7.6 MG/DL (ref 8–23)
CALCIUM SERPL-MCNC: 8.4 MG/DL (ref 8.8–10.4)
CHLORIDE SERPL-SCNC: 111 MMOL/L (ref 98–107)
CREAT SERPL-MCNC: 0.64 MG/DL (ref 0.67–1.17)
EGFRCR SERPLBLD CKD-EPI 2021: >90 ML/MIN/1.73M2
ERYTHROCYTE [DISTWIDTH] IN BLOOD BY AUTOMATED COUNT: 12.9 % (ref 10–15)
GLUCOSE SERPL-MCNC: 138 MG/DL (ref 70–99)
HCO3 SERPL-SCNC: 19 MMOL/L (ref 22–29)
HCT VFR BLD AUTO: 40.2 % (ref 40–53)
HGB BLD-MCNC: 13.6 G/DL (ref 13.3–17.7)
MCH RBC QN AUTO: 30.5 PG (ref 26.5–33)
MCHC RBC AUTO-ENTMCNC: 33.8 G/DL (ref 31.5–36.5)
MCV RBC AUTO: 90 FL (ref 78–100)
PLATELET # BLD AUTO: 166 10E3/UL (ref 150–450)
POTASSIUM SERPL-SCNC: 3.7 MMOL/L (ref 3.4–5.3)
RADIOLOGIST FLAGS: ABNORMAL
RBC # BLD AUTO: 4.46 10E6/UL (ref 4.4–5.9)
SODIUM SERPL-SCNC: 141 MMOL/L (ref 135–145)
WBC # BLD AUTO: 11.1 10E3/UL (ref 4–11)

## 2024-10-29 PROCEDURE — 250N000011 HC RX IP 250 OP 636

## 2024-10-29 PROCEDURE — 258N000003 HC RX IP 258 OP 636

## 2024-10-29 PROCEDURE — 36415 COLL VENOUS BLD VENIPUNCTURE: CPT

## 2024-10-29 PROCEDURE — 70553 MRI BRAIN STEM W/O & W/DYE: CPT | Mod: MG

## 2024-10-29 PROCEDURE — 999N000128 HC STATISTIC PERIPHERAL IV START W/O US GUIDANCE

## 2024-10-29 PROCEDURE — 255N000002 HC RX 255 OP 636

## 2024-10-29 PROCEDURE — G1010 CDSM STANSON: HCPCS | Performed by: RADIOLOGY

## 2024-10-29 PROCEDURE — 85027 COMPLETE CBC AUTOMATED: CPT

## 2024-10-29 PROCEDURE — 200N000002 HC R&B ICU UMMC

## 2024-10-29 PROCEDURE — A9585 GADOBUTROL INJECTION: HCPCS

## 2024-10-29 PROCEDURE — 93005 ELECTROCARDIOGRAM TRACING: CPT

## 2024-10-29 PROCEDURE — 97530 THERAPEUTIC ACTIVITIES: CPT | Mod: GO

## 2024-10-29 PROCEDURE — 250N000013 HC RX MED GY IP 250 OP 250 PS 637

## 2024-10-29 PROCEDURE — 250N000013 HC RX MED GY IP 250 OP 250 PS 637: Performed by: NEUROLOGICAL SURGERY

## 2024-10-29 PROCEDURE — 80048 BASIC METABOLIC PNL TOTAL CA: CPT

## 2024-10-29 PROCEDURE — 250N000012 HC RX MED GY IP 250 OP 636 PS 637

## 2024-10-29 PROCEDURE — 97535 SELF CARE MNGMENT TRAINING: CPT | Mod: GO

## 2024-10-29 PROCEDURE — 82310 ASSAY OF CALCIUM: CPT

## 2024-10-29 PROCEDURE — 97165 OT EVAL LOW COMPLEX 30 MIN: CPT | Mod: GO

## 2024-10-29 PROCEDURE — 82374 ASSAY BLOOD CARBON DIOXIDE: CPT

## 2024-10-29 PROCEDURE — 70553 MRI BRAIN STEM W/O & W/DYE: CPT | Mod: 26 | Performed by: RADIOLOGY

## 2024-10-29 RX ORDER — NALOXONE HYDROCHLORIDE 0.4 MG/ML
0.4 INJECTION, SOLUTION INTRAMUSCULAR; INTRAVENOUS; SUBCUTANEOUS
Status: DISCONTINUED | OUTPATIENT
Start: 2024-10-29 | End: 2024-11-01 | Stop reason: HOSPADM

## 2024-10-29 RX ORDER — NALOXONE HYDROCHLORIDE 0.4 MG/ML
0.2 INJECTION, SOLUTION INTRAMUSCULAR; INTRAVENOUS; SUBCUTANEOUS
Status: DISCONTINUED | OUTPATIENT
Start: 2024-10-29 | End: 2024-11-01 | Stop reason: HOSPADM

## 2024-10-29 RX ORDER — SODIUM CHLORIDE 9 MG/ML
INJECTION, SOLUTION INTRAVENOUS CONTINUOUS
Status: ACTIVE | OUTPATIENT
Start: 2024-10-29 | End: 2024-10-30

## 2024-10-29 RX ORDER — CALCIUM GLUCONATE 20 MG/ML
2 INJECTION, SOLUTION INTRAVENOUS ONCE
Status: COMPLETED | OUTPATIENT
Start: 2024-10-29 | End: 2024-10-29

## 2024-10-29 RX ORDER — GADOBUTROL 604.72 MG/ML
7.5 INJECTION INTRAVENOUS ONCE
Status: COMPLETED | OUTPATIENT
Start: 2024-10-29 | End: 2024-10-29

## 2024-10-29 RX ORDER — POTASSIUM CHLORIDE 1.5 G/1.58G
20 POWDER, FOR SOLUTION ORAL ONCE
Status: COMPLETED | OUTPATIENT
Start: 2024-10-29 | End: 2024-10-29

## 2024-10-29 RX ADMIN — ROSUVASTATIN CALCIUM 10 MG: 10 TABLET, FILM COATED ORAL at 08:38

## 2024-10-29 RX ADMIN — ONDANSETRON 4 MG: 4 TABLET, ORALLY DISINTEGRATING ORAL at 15:41

## 2024-10-29 RX ADMIN — HYDROMORPHONE HYDROCHLORIDE 0.2 MG: 0.2 INJECTION, SOLUTION INTRAMUSCULAR; INTRAVENOUS; SUBCUTANEOUS at 04:11

## 2024-10-29 RX ADMIN — SODIUM CHLORIDE: 9 INJECTION, SOLUTION INTRAVENOUS at 21:21

## 2024-10-29 RX ADMIN — SENNOSIDES AND DOCUSATE SODIUM 1 TABLET: 8.6; 5 TABLET ORAL at 08:39

## 2024-10-29 RX ADMIN — ACETAMINOPHEN 975 MG: 325 TABLET, FILM COATED ORAL at 13:37

## 2024-10-29 RX ADMIN — ACETAMINOPHEN 975 MG: 325 TABLET, FILM COATED ORAL at 21:29

## 2024-10-29 RX ADMIN — POTASSIUM CHLORIDE 20 MEQ: 1.5 POWDER, FOR SOLUTION ORAL at 13:37

## 2024-10-29 RX ADMIN — ONDANSETRON 4 MG: 2 INJECTION INTRAMUSCULAR; INTRAVENOUS at 21:29

## 2024-10-29 RX ADMIN — PROCHLORPERAZINE EDISYLATE 5 MG: 5 INJECTION INTRAMUSCULAR; INTRAVENOUS at 08:33

## 2024-10-29 RX ADMIN — ACETAMINOPHEN 975 MG: 325 TABLET, FILM COATED ORAL at 06:19

## 2024-10-29 RX ADMIN — CALCIUM GLUCONATE 2 G: 20 INJECTION, SOLUTION INTRAVENOUS at 18:40

## 2024-10-29 RX ADMIN — DEXAMETHASONE 4 MG: 4 TABLET ORAL at 13:37

## 2024-10-29 RX ADMIN — GADOBUTROL 7.5 ML: 604.72 INJECTION INTRAVENOUS at 16:21

## 2024-10-29 RX ADMIN — PROCHLORPERAZINE EDISYLATE 5 MG: 5 INJECTION INTRAMUSCULAR; INTRAVENOUS at 01:17

## 2024-10-29 RX ADMIN — DEXAMETHASONE 4 MG: 4 TABLET ORAL at 08:38

## 2024-10-29 RX ADMIN — PROCHLORPERAZINE EDISYLATE 5 MG: 5 INJECTION INTRAMUSCULAR; INTRAVENOUS at 23:57

## 2024-10-29 RX ADMIN — SENNOSIDES AND DOCUSATE SODIUM 1 TABLET: 8.6; 5 TABLET ORAL at 19:34

## 2024-10-29 RX ADMIN — METHOCARBAMOL 500 MG: 500 TABLET ORAL at 16:50

## 2024-10-29 RX ADMIN — ONDANSETRON 4 MG: 2 INJECTION INTRAMUSCULAR; INTRAVENOUS at 04:08

## 2024-10-29 RX ADMIN — PROCHLORPERAZINE EDISYLATE 5 MG: 5 INJECTION INTRAMUSCULAR; INTRAVENOUS at 13:35

## 2024-10-29 RX ADMIN — ONDANSETRON 4 MG: 2 INJECTION INTRAMUSCULAR; INTRAVENOUS at 10:10

## 2024-10-29 RX ADMIN — POLYETHYLENE GLYCOL 3350 17 G: 17 POWDER, FOR SOLUTION ORAL at 08:38

## 2024-10-29 RX ADMIN — DEXAMETHASONE 4 MG: 4 TABLET ORAL at 19:34

## 2024-10-29 ASSESSMENT — VISUAL ACUITY
OU: BASELINE;GLASSES

## 2024-10-29 NOTE — PROGRESS NOTES
Admitted/transferred from: Pacu  Reason for admission/transfer: Neuro check  2 RN skin assessment: completed by Judie MUNOZ And Jacque HAINES   Result of skin assessment and interventions/actions: Crani site, abdomen graft site, scab R calf  Height, weight, drug calc weight: Done  Patient belongings (see Flowsheet)  MDRO education added to care planN/A  ?

## 2024-10-29 NOTE — PROGRESS NOTES
[facility], [place]   10/29/2024  Neurosurgery Progress Note:      Interval History:   No acute events overnight  Post-operative nausea better controlled with scheduled zofran      Assessment:  Vinh Mccarty is a 69 year old male with past medical history significant for hypertension and hyperlipidemia, as well as a previously resected RIGHT-sided clinoidal meningioma, who presents for resection of a petrous ridge meningioma with Dr. Fox on 10/28/24.    He is currently post-operative day 1.    Plan:  - Serial neuro exams  - tylenlol, oxycodone prn  - Decadron 4d taper  - MRI brain with/without today  - HOB > 30  - SBP <140  - labetalol/hydralazine prn  - Incentive spirometry  - prn antiemetics  - bowel regimen: miralax, senna    - Advance diet as tolerated  - strict I/O  - IVF until adequate PO intake  - Electrolyte replacement protocol    - Hgb > 8  - plts > 100k  - INR < 1.5    - monitor for fever  - Trial of Void today  - SCDs for DVT proph    - PT/OT  - Transfer to floor today    -----------------------------------  DANUTA ZAPATA MD  PGY2 Neurosurgery  On call pager #[phone]  -----------------------------------    Objective:   Temp:  [97.6  F (36.4  C)-99.9  F (37.7  C)] 97.6  F (36.4  C)  Pulse:  [] 90  Resp:  [10-26] 16  BP: ()/(53-94) 127/65  MAP:  [53 mmHg-248 mmHg] 94 mmHg  Arterial Line BP: (103-165)/() 108/81  SpO2:  [96 %-100 %] 97 %  I/O last 3 completed shifts:  In: 3928.75 [P.O.:240; I.V.:3688.75]  Out: 2285 [Urine:2285]    Gen: Appears comfortable, NAD  Wound: clean, dry, intact   Neurologic:  - Alert & Oriented to person, place, time, and situation  - Follows commands briskly  - Speech fluent, spontaneous. No aphasia or dysarthria  - No gaze preference. No apparent hemineglect  - RIGHT eye perceives only shapes; LEFT eye VA 20/80  - Face sensation intact to light touch, and activates symmetrically  - Hearing intact to finger rustle bilaterally  -  Palate elevates symmetrically with uvula midline  - Tongue protrudes midline    - Trapezii muscles 5/5 bilaterally  - No pronator drift     Del Tr Bi Grp/FE   R 5 5 5 5   L 5 5 5 5    HF KE DF PF   R 5 5 5 5   L 5 5 5 5     Reflexes 2+ throughout  No 's or Clonus, with down-going toes    Sensation intact and symmetric to light touch throughout      LABS:  Recent Labs   Lab 10/29/24  0613 10/28/24  2135 10/28/24  0628     --   --    POTASSIUM 3.7  --   --    CHLORIDE 111*  --   --    CO2 19*  --   --    ANIONGAP 11  --   --    * 148* 108*   BUN 7.6*  --   --    CR 0.64*  --   --    BRAD 8.4*  --   --        Recent Labs   Lab 10/29/24  0613   WBC 11.1*   RBC 4.46   HGB 13.6   HCT 40.2   MCV 90   MCH 30.5   MCHC 33.8   RDW 12.9          IMAGING:  No results found for this or any previous visit (from the past 24 hours).    I have reviewed the history above and agree with the resident's assessment and plan.  SHAHLA Fox MD

## 2024-10-29 NOTE — PROGRESS NOTES
"   10/29/24 0930   Appointment Info   Signing Clinician's Name / Credentials (OT) Melody Greenberg, OTR/L   Rehab Comments (OT) crani   Living Environment   People in Home spouse   Current Living Arrangements house   Home Accessibility stairs to enter home   Number of Stairs, Main Entrance 3   Stair Railings, Main Entrance railings on both sides of stairs   Transportation Anticipated family or friend will provide   Living Environment Comments Pt lives with spouse in 1 level home w/ basement all needs met on main level. 3 PIETER, rosa maria handrails. Has tub/shower on main level with hand held shower head, no chair or grab bars.   Self-Care   Usual Activity Tolerance good   Current Activity Tolerance moderate   Regular Exercise Yes   Activity/Exercise Type walking   Exercise Amount/Frequency daily  (1 mi)   Equipment Currently Used at Home none   Fall history within last six months no   Activity/Exercise/Self-Care Comment IND in ADLs prior to admission   Instrumental Activities of Daily Living (IADL)   IADL Comments splits IADLs with spouse, IND   General Information   Onset of Illness/Injury or Date of Surgery 10/28/24   Referring Physician Desirae Anders PA-C   Patient/Family Therapy Goal Statement (OT) Reduce pain and return home   Additional Occupational Profile Info/Pertinent History of Current Problem Per EMR, \"69 year old man s/p right clinoidal meningioma resection in 2019 after he was found to have vision loss. 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 Although he is asymptomatic from the petrous ridge tumor, surgery is offered.\"   Existing Precautions/Restrictions fall;other (see comments)  (crani)   Left Upper Extremity (Weight-bearing Status) partial weight-bearing (PWB)  (<10 lbs lifting)   Right Upper Extremity (Weight-bearing Status) partial " weight-bearing (PWB)  (<10 lbs lifting)   General Observations and Info Activity: up with assist   Cognitive Status Examination   Orientation Status orientation to person, place and time   Cognitive Status Comments no concerns   Visual Perception   Visual Impairment/Limitations WFL;corrective lenses full-time   Sensory   Sensory Quick Adds sensation intact   Pain Assessment   Patient Currently in Pain Yes, see Vital Sign flowsheet   Posture   Posture forward head position;protracted shoulders   Range of Motion Comprehensive   General Range of Motion no range of motion deficits identified   Strength Comprehensive (MMT)   Comment, General Manual Muscle Testing (MMT) Assessment general weakness   Coordination   Upper Extremity Coordination No deficits were identified   Bed Mobility   Bed Mobility supine-sit   Supine-Sit [place] (Bed Mobility) contact guard   Transfers   Transfers sit-stand transfer   Transfer Comments CGA   Balance   Balance Assessment standing dynamic balance   Standing Balance: Dynamic contact guard   Activities of Daily Living   BADL Assessment/Intervention bathing;lower body dressing;grooming;toileting   Bathing Assessment/Intervention   [place] Level (Bathing) contact guard assist   Comment, (Bathing) per clinical judgement   Lower Body Dressing Assessment/Training   Comment, (Lower Body Dressing) this date d/t pain   [place] Level (Lower Body Dressing) maximum assist (25% patient effort)   Grooming Assessment/Training   [place] Level (Grooming) supervision;set up   Comment, (Grooming) g/h tasks sitting in chair   Toileting   Comment, (Toileting) per clinical judgement   [place] Level (Toileting) minimum assist (75% patient effort)   Clinical Impression   Criteria for Skilled Therapeutic Interventions Met (OT) Yes, treatment indicated   OT Diagnosis Dec IND in I/ADLs   OT Problem List-Impairments impacting ADL problems related to;activity tolerance  impaired;pain;post-surgical precautions;strength   Assessment of Occupational Performance 1-3 Performance Deficits   Identified Performance Deficits dressing, bathing, toileting   Planned Therapy Interventions (OT) ADL retraining;IADL retraining;strengthening;transfer training;home program guidelines;progressive activity/exercise   Clinical Decision Making Complexity (OT) problem focused assessment/low complexity   Risk & Benefits of therapy have been explained evaluation/treatment results reviewed;care plan/treatment goals reviewed;risks/benefits reviewed;current/potential barriers reviewed;participants voiced agreement with care plan;participants included;patient   Clinical Impression Comments Pt appears below functonal baseline. Would benefit from continued OT services to promote strength and IND in I/ADLs   OT Total Evaluation Time   OT Eval, Low Complexity Minutes (16557) 8   OT Goals   Therapy Frequency (OT) 6 times/week   OT Predicted Duration/Target Date for Goal Attainment 11/12/24   OT Goals Hygiene/Grooming;Lower Body Dressing;Lower Body Bathing;Toilet Transfer/Toileting;Aerobic Activity;OT Goal 1;OT Goal 2;Transfers   OT: Hygiene/Grooming modified independent;within precautions   OT: Lower Body Dressing Modified independent;within precautions   OT: Lower Body Bathing Modified independent;with precautions   OT: Transfer Supervision/stand-by assist  (tub transfer)   OT: Toilet Transfer/Toileting Modified independent;within precautions   OT: Perform aerobic activity with stable cardiovascular response continuous activity;30 minutes;ambulation   OT: Goal 1 Pt will adhere to crani precautions 100% of the time during ADLs and functional mobility/transfers to promote safety and dc to home   OT: Goal 2 Pt will complete 3 stairs w/ SBA to promote safety for dc home  (pending PT involvement)   OT Discharge Planning   OT Plan screen PT, review precautions (handout in room), standing ADLs, LBD   OT Discharge  Recommendation (DC Rec) home with assist   OT Rationale for DC Rec Pt appears below functional baseline. Limited by nausea and pain this date. Anticipate with LOS and progress, pt will be safe for return home with assist from family (sister and spouse available 24/7)   OT Brief overview of current status CGA SPT   Total Session Time   Timed Code Treatment Minutes 19   Total Session Time (sum of timed and untimed services) 27

## 2024-10-29 NOTE — PLAN OF CARE
Goal Outcome Evaluation:      Plan of Care Reviewed With: patient    Overall Patient Progress: improving    Outcome Evaluation: Neuros remain unchanged; A&O x4 moving all extremities.  Head pain is tolerable and managed well with tylenol.  Has been in chair stating it is more comfortable; transfered with standby assist.  Continues to have intermittent nausea and dry heaves.  Tolerated clear fluids    Major Shift Events:  Neuros remain unchanged through shift.  Alert and oriented x4.  Strength 5/5 in all extremities.  Right eye blurry at baseline.  Crani site has scant drainage from previous shift.  Mild headaches controlled with tylenol.  Continues to have intermittent nausea and dry heaving controlled with antiemetics.  Up to chair with standby assist.   Intermittently has bradycardia rates 39-50bpm when sleeping; EKG done and team aware.    Plan: Transfer orders placed around 1300.  Follow-up MRI complete; results pending    For vital signs and complete assessments, please see documentation flowsheets.

## 2024-10-29 NOTE — PLAN OF CARE
Problem: Craniotomy/Craniectomy/Cranioplasty  Goal: Optimal Coping with Surgery  Outcome: Progressing  Intervention: Support Psychosocial Response to Surgery  Recent Flowsheet Documentation  Taken 10/29/2024 0400 by Judie Daniels RN  Family/Support System Care: caregiver stress acknowledged  Taken 10/29/2024 0000 by Judie Daniels RN  Family/Support System Care: caregiver stress acknowledged  Taken 10/28/2024 2140 by Judie Daniels RN  Family/Support System Care: caregiver stress acknowledged  Goal: Absence of Bleeding  Outcome: Progressing  Intervention: Monitor and Manage Bleeding  Recent Flowsheet Documentation  Taken 10/29/2024 0400 by Judie Daniels RN  Bleeding Management: dressing monitored  Taken 10/29/2024 0000 by Judie Daniels RN  Bleeding Management: dressing monitored  Taken 10/28/2024 2140 by Judie Daniels RN  Bleeding Management: dressing monitored  Goal: Effective Bowel Elimination  Outcome: Progressing  Goal: Optimal Cerebral Tissue Perfusion  Outcome: Progressing  Goal: Fluid and Electrolyte Balance  Outcome: Progressing  Goal: Optimal Functional Ability  Outcome: Progressing  Intervention: Optimize Functional Ability  Recent Flowsheet Documentation  Taken 10/29/2024 0400 by Judie Daniels RN  Activity Management: activity adjusted per tolerance  Activity Assistance Provided: assistance, 1 person  Taken 10/29/2024 0000 by Judie Daniels RN  Activity Management: activity adjusted per tolerance  Activity Assistance Provided: assistance, 1 person  Taken 10/28/2024 2140 by Judie Daniels RN  Activity Management: activity adjusted per tolerance  Activity Assistance Provided: assistance, 1 person  Goal: Absence of Infection Signs and Symptoms  Outcome: Progressing  Goal: Anesthesia/Sedation Recovery  Outcome: Progressing  Intervention: Optimize Anesthesia Recovery  Recent Flowsheet Documentation  Taken 10/29/2024 0400 by Judie Daniels RN  Safety Promotion/Fall Prevention: activity  supervised  Taken 10/29/2024 0000 by Judie Daniels RN  Safety Promotion/Fall Prevention: activity supervised  Taken 10/28/2024 2140 by Judie Daniels RN  Safety Promotion/Fall Prevention: activity supervised  Goal: Acceptable Pain Control  Outcome: Progressing  Intervention: Prevent or Manage Pain  Recent Flowsheet Documentation  Taken 10/29/2024 0400 by Judie Daniels RN  Pain Management Interventions: medication (see MAR)  Goal: Nausea and Vomiting Relief  Outcome: Progressing  Intervention: Prevent or Manage Postoperative Nausea and Vomiting  Recent Flowsheet Documentation  Taken 10/29/2024 0400 by Judie Daniels RN  Nausea/Vomiting Interventions: antiemetic  Taken 10/29/2024 0000 by Judie Daniels RN  Nausea/Vomiting Interventions: antiemetic  Taken 10/28/2024 2140 by Judie Daniels RN  Nausea/Vomiting Interventions: antiemetic  Goal: Effective Urinary Elimination  Outcome: Progressing  Intervention: Monitor and Manage Urinary Retention  Recent Flowsheet Documentation  Taken 10/29/2024 0400 by Judie Daniels RN  Urinary Elimination Promotion: catheter patency maintained  Taken 10/29/2024 0000 by Judie Daniels RN  Urinary Elimination Promotion: catheter patency maintained  Taken 10/28/2024 2140 by Judie Daniels RN  Urinary Elimination Promotion: catheter patency maintained  Goal: Effective Oxygenation and Ventilation  Outcome: Progressing  Intervention: Optimize Oxygenation and Ventilation  Recent Flowsheet Documentation  Taken 10/29/2024 0400 by Judie Daniels RN  Head of Bed (HOB) Positioning: HOB at 30-45 degrees  Taken 10/29/2024 0000 by Judie Daniels RN  Head of Bed (HOB) Positioning: HOB at 30-45 degrees  Taken 10/28/2024 2140 by Judie Daniels RN  Head of Bed (HOB) Positioning: HOB at 30-45 degrees   Goal Outcome Evaluation:    Major Shift Events:  No acute events overnight. No neuro changes. R eye blurry baseline. VSS on room air. Intermittent nausea. Tolerating clear liquid diet. [place]  with good urine output.   Plan: Continue plan of care.   For vital signs and complete assessments, please see documentation flowsheets.

## 2024-10-29 NOTE — PHARMACY-ADMISSION MEDICATION HISTORY
Pharmacist Admission Medication History    Admission medication history is complete. The information provided in this note is only as accurate as the sources available at the time of the update.    Information Source(s): Patient, Hospital records, and CareEverywhere/SureScripts via in-person    Pertinent Information:   - Pt manages own medications and appears to be an accurate/reliable historian.  - Pt stopped using citalopram due to perceived ineffectiveness and informed PCP. Therefore left off of list.  - Pt only now uses OTC eye drops for dry eyes.    Changes made to PTA medication list:  Added:   Systane Ultra ophth drops  Deleted:   Brinzolamide 1% ophth susp  Latanoprost 0.005% opth soln  Timolol maleate 0.5% opth soln  Dexamethasone 2 mg tablet  Oxycodone 5 mg tablet  Changed:   Amlodipine 5 mg tab->added SIG below      Allergies reviewed with patient and updates made in EHR: yes    Medication History Completed By: Jared Crook RPH 10/29/2024 2:56 PM    PTA Med List   Medication Sig Note Last Dose/Taking    acetaminophen (TYLENOL) 325 MG tablet Take 2 tablets (650 mg) by mouth every 4 hours as needed for other (multimodal surgical pain management along with NSAIDS and opioid medication as indicated based on pain control and physical function.)  Past Month    amLODIPine (NORVASC) 5 MG tablet Take 5 mg by mouth daily.  10/27/2024    CRESTOR 10 MG tablet TAKE 1 TABLET (10 MG) BY MOUTH DAILY  10/27/2024    omeprazole (PRILOSEC) 40 MG DR capsule TAKE 1 CAPSULE (40MG) BY MOUTH ONCE DAILY AS NEEDED FOR REFLUX  More than a month    polyethylene glycol-propylene glycol (SYSTANE ULTRA) 0.4-0.3 % SOLN ophthalmic solution Place 1 drop into both eyes every hour as needed for dry eyes.  Past Week    ramipril (ALTACE) 10 MG capsule Take 10 mg by mouth daily. 10/24/2024: Hold DOS 10/27/2024    sodium chloride (OCEAN) 0.65 % nasal spray Spray 1 spray in nostril daily as needed.  Past Week     Chen HenningD.,  JAVAN.Ph., PGY2 Critical Care Resident Pharmacist

## 2024-10-29 NOTE — OP NOTE
[facility]  Department of Neurosurgery  Operative report     Procedure date: 10/28/2024     Preoperative diagnoses:  Right petrous ridge meningioma  Brainstem compression secondary to 1.     Postoperative diagnoses:  Right petrous ridge meningioma   Brainstem compression secondary to 1.     Procedures performed:  Right lateral suboccipital craniectomy   Right extended retrosigmoid approach for meningioma resection  Use of operative microscope  Zion of right abdominal fat graft  Titanium mesh cranioplasty < 5 cm     Attending surgeon: Matthew Fox MD     Assistants:  Malaika Powell MD     Estimated blood loss: 100 cc     Anesthesia: General     Indications for procedure:  Mr. Mccarty is a 69 year old man who underwent right clinoidal meningioma resection in 2019 after presenting with vision loss. After the surgery, his vision did not recover. We have been following him for the right petrous ridge meningioma but it has been growing relatively rapidly for the past few years. Although he is asymptomatic from the petrous ridge tumor, surgery is offered based on the current growth rate and his good life expectancy.       Procedure in detail: The patient was brought to the operating room and intubated.  The anesthesiology service obtained intra-arterial and additional venous access.  The patient was given dexamethasone, antibiotic prophylaxis.  The bed was turned 180 degrees.  The patient's head was secured in a Reynolds head burt. He was placed in the lateral position with the right side up. The dependent arm was supported on an armboard.  All pressure points padded.  The retroauricular area was shaved and a C-shaped incision was planned. A linear incision was marked and prepped on the right lower abdominal site. Local anesthetic was administered. Electrodes for BAR and facial nerve EMG were established by the [employer] service. The retroauricular area was sterilely prepared and draped in usual  fashion.  A timeout was performed.     After timeout was performed, we began the approach.  The skin incision was opened and a myocutaneous flap was reflected anteriorly.  The suboccipital bone was exposed. We performed a large retromastoid exposure and exposed the transverse sinus, transverse-sigmoid junction, and sigmoid sinus down towards the jugular bulb. The lateral suboccipital craniectomy was initiated with a  and widened with the high speed cutting drill and Kerrison punches. No bone flap was elevated. Instead, the dura was exposed by drilling the bone away to complete the craniectomy. The mastoid air cells were waxed.       At this point, we then opened the dura in curvilinear fashion and retracted against the sigmoid sinus.  We brought in the operating microscope.  We worked in the cerebellomedullary cistern, the arachnoid was opened and csf was released, resulting in good brain relaxation.  We identified the accessory nerve and the cranial nerves IX-X bundle.  We then worked towards the superior aspect and identified the cranial nerve VII-VIII complex We identified CN 7 and 8 complex. Superiorly we identified CN 5 and we saw the tumor ventral to the CN 5. The suprameatal tubercle was then flattened using 2mm mariya iam to provide wider corridor for the visualization and microinstruments.     We first cauterized the tumor capsule between CN 5 and 7, and debulked the tumor using suction. This required intermittent retraction on CN 7 and 8. We then worked superior to CN 5 and debulked the tumor. It was very soft and had rich vascular supply. We were able to dissect the tumor from the ventral aspect of CN 5. We then alternated working between the two corridors until all the tumor capsule was removed. At this juncture we saw the hyperostosis of the petrous ridge ventral to CN 5. A 2 mm mariya iam was used to drill away the hyperostosis bone. We irrigated the wound and hemostasis was achieved.  There was no obvious residual tumor.     Having accomplished the goals of the surgery, we turned towards closure.  Duraplasty was performed using a suturable DuraGen graft and 4-0 Nurolon. The mastoid air cells were rewaxed again.  Dural sealant was applied to the suture line. Another piece of Duragen was placed in onlay fashion. A linear incision was made in the right lower abdomen, and subcutaneous fat was harvested and placed over the craniectomy site.     A piece of Synthes titanium mesh was used to cover the skull defect <5 cm. There was no bone flap to replace because a craniectomy was performed using the high speed cutting drill.      The suboccipital muscle and fascia were closed with interrupted 2-0 Vicryl sutures.  The fascia and galea were then closed with interrupted 2-0 Vicryl sutures.  The skin was closed with 3-0 running Nylon sutures.  The wound was sterilely dressed. The BAERS transiently slowed on the right side but was near baseline at the end of the operation.     After the procedure was concluded, the sterile drapes were removed and the Reynolds head burt was removed. The patient was returned to the supine position and extubated without incident.      The Dr. Monte was present for the key portions and was immediately available for the entire operation.     Malaika Powell MD   Neurosurgery resident      ATTESTATION: My signature attests that I was present for the key portions and immediately available for the entire operation described above. I agree with the above findings. Please note that the drilling of the petrous ridge added an extra 20% time and effort to the operation.    MARE MONTE MD

## 2024-10-30 ENCOUNTER — APPOINTMENT (OUTPATIENT)
Dept: OCCUPATIONAL THERAPY | Facility: CLINIC | Age: 69
DRG: 025 | End: 2024-10-30
Attending: NEUROLOGICAL SURGERY
Payer: MEDICARE

## 2024-10-30 LAB
ANION GAP SERPL CALCULATED.3IONS-SCNC: 10 MMOL/L (ref 7–15)
ATRIAL RATE - MUSE: 59 BPM
BUN SERPL-MCNC: 10.5 MG/DL (ref 8–23)
CALCIUM SERPL-MCNC: 8.7 MG/DL (ref 8.8–10.4)
CHLORIDE SERPL-SCNC: 114 MMOL/L (ref 98–107)
CREAT SERPL-MCNC: 0.64 MG/DL (ref 0.67–1.17)
DIASTOLIC BLOOD PRESSURE - MUSE: NORMAL MMHG
EGFRCR SERPLBLD CKD-EPI 2021: >90 ML/MIN/1.73M2
ERYTHROCYTE [DISTWIDTH] IN BLOOD BY AUTOMATED COUNT: 12.8 % (ref 10–15)
GLUCOSE SERPL-MCNC: 142 MG/DL (ref 70–99)
HCO3 SERPL-SCNC: 21 MMOL/L (ref 22–29)
HCT VFR BLD AUTO: 37.7 % (ref 40–53)
HGB BLD-MCNC: 12.8 G/DL (ref 13.3–17.7)
INTERPRETATION ECG - MUSE: NORMAL
MAGNESIUM SERPL-MCNC: 2 MG/DL (ref 1.7–2.3)
MCH RBC QN AUTO: 30.7 PG (ref 26.5–33)
MCHC RBC AUTO-ENTMCNC: 34 G/DL (ref 31.5–36.5)
MCV RBC AUTO: 90 FL (ref 78–100)
P AXIS - MUSE: 27 DEGREES
PATH REPORT.COMMENTS IMP SPEC: ABNORMAL
PATH REPORT.COMMENTS IMP SPEC: YES
PATH REPORT.FINAL DX SPEC: ABNORMAL
PATH REPORT.GROSS SPEC: ABNORMAL
PATH REPORT.MICROSCOPIC SPEC OTHER STN: ABNORMAL
PATH REPORT.RELEVANT HX SPEC: ABNORMAL
PHOSPHATE SERPL-MCNC: 2.1 MG/DL (ref 2.5–4.5)
PHOTO IMAGE: ABNORMAL
PLATELET # BLD AUTO: 144 10E3/UL (ref 150–450)
POTASSIUM SERPL-SCNC: 3.9 MMOL/L (ref 3.4–5.3)
PR INTERVAL - MUSE: 124 MS
QRS DURATION - MUSE: 90 MS
QT - MUSE: 404 MS
QTC - MUSE: 399 MS
R AXIS - MUSE: 43 DEGREES
RBC # BLD AUTO: 4.17 10E6/UL (ref 4.4–5.9)
SODIUM SERPL-SCNC: 145 MMOL/L (ref 135–145)
SYSTOLIC BLOOD PRESSURE - MUSE: NORMAL MMHG
T AXIS - MUSE: 40 DEGREES
VENTRICULAR RATE- MUSE: 59 BPM
WBC # BLD AUTO: 12 10E3/UL (ref 4–11)

## 2024-10-30 PROCEDURE — 82565 ASSAY OF CREATININE: CPT

## 2024-10-30 PROCEDURE — 36415 COLL VENOUS BLD VENIPUNCTURE: CPT

## 2024-10-30 PROCEDURE — 258N000003 HC RX IP 258 OP 636

## 2024-10-30 PROCEDURE — 82435 ASSAY OF BLOOD CHLORIDE: CPT

## 2024-10-30 PROCEDURE — 80048 BASIC METABOLIC PNL TOTAL CA: CPT

## 2024-10-30 PROCEDURE — 97530 THERAPEUTIC ACTIVITIES: CPT | Mod: GO

## 2024-10-30 PROCEDURE — 85027 COMPLETE CBC AUTOMATED: CPT

## 2024-10-30 PROCEDURE — 83735 ASSAY OF MAGNESIUM: CPT

## 2024-10-30 PROCEDURE — 250N000013 HC RX MED GY IP 250 OP 250 PS 637: Performed by: NEUROLOGICAL SURGERY

## 2024-10-30 PROCEDURE — 250N000013 HC RX MED GY IP 250 OP 250 PS 637

## 2024-10-30 PROCEDURE — 200N000002 HC R&B ICU UMMC

## 2024-10-30 PROCEDURE — 97535 SELF CARE MNGMENT TRAINING: CPT | Mod: GO

## 2024-10-30 PROCEDURE — 250N000012 HC RX MED GY IP 250 OP 636 PS 637

## 2024-10-30 PROCEDURE — 99222 1ST HOSP IP/OBS MODERATE 55: CPT | Mod: 24 | Performed by: INTERNAL MEDICINE

## 2024-10-30 PROCEDURE — 84100 ASSAY OF PHOSPHORUS: CPT

## 2024-10-30 PROCEDURE — 250N000011 HC RX IP 250 OP 636

## 2024-10-30 RX ORDER — DEXAMETHASONE 2 MG/1
TABLET ORAL
Qty: 3 TABLET | Refills: 0 | Status: SHIPPED | OUTPATIENT
Start: 2024-10-31 | End: 2024-11-01

## 2024-10-30 RX ORDER — MAGNESIUM OXIDE 400 MG/1
400 TABLET ORAL EVERY 4 HOURS
Status: COMPLETED | OUTPATIENT
Start: 2024-10-30 | End: 2024-10-30

## 2024-10-30 RX ORDER — SODIUM CHLORIDE 9 MG/ML
INJECTION, SOLUTION INTRAVENOUS CONTINUOUS
Status: ACTIVE | OUTPATIENT
Start: 2024-10-30 | End: 2024-11-01

## 2024-10-30 RX ORDER — CAFFEINE 200 MG
200 TABLET ORAL DAILY PRN
Status: DISCONTINUED | OUTPATIENT
Start: 2024-10-30 | End: 2024-11-01 | Stop reason: HOSPADM

## 2024-10-30 RX ADMIN — SODIUM CHLORIDE: 9 INJECTION, SOLUTION INTRAVENOUS at 09:52

## 2024-10-30 RX ADMIN — DEXAMETHASONE 4 MG: 4 TABLET ORAL at 07:31

## 2024-10-30 RX ADMIN — PROCHLORPERAZINE MALEATE 5 MG: 5 TABLET ORAL at 16:35

## 2024-10-30 RX ADMIN — ACETAMINOPHEN 975 MG: 325 TABLET, FILM COATED ORAL at 21:39

## 2024-10-30 RX ADMIN — POTASSIUM & SODIUM PHOSPHATES POWDER PACK 280-160-250 MG 1 PACKET: 280-160-250 PACK at 15:04

## 2024-10-30 RX ADMIN — ONDANSETRON 4 MG: 4 TABLET, ORALLY DISINTEGRATING ORAL at 03:26

## 2024-10-30 RX ADMIN — SODIUM CHLORIDE 20 ML/HR: 9 INJECTION, SOLUTION INTRAVENOUS at 08:07

## 2024-10-30 RX ADMIN — PROCHLORPERAZINE MALEATE 5 MG: 5 TABLET ORAL at 05:37

## 2024-10-30 RX ADMIN — ROSUVASTATIN CALCIUM 10 MG: 10 TABLET, FILM COATED ORAL at 07:31

## 2024-10-30 RX ADMIN — POTASSIUM & SODIUM PHOSPHATES POWDER PACK 280-160-250 MG 1 PACKET: 280-160-250 PACK at 07:30

## 2024-10-30 RX ADMIN — POLYETHYLENE GLYCOL 3350 17 G: 17 POWDER, FOR SOLUTION ORAL at 07:31

## 2024-10-30 RX ADMIN — MAGNESIUM OXIDE TAB 400 MG (241.3 MG ELEMENTAL MG) 400 MG: 400 (241.3 MG) TAB at 10:38

## 2024-10-30 RX ADMIN — ACETAMINOPHEN 975 MG: 325 TABLET, FILM COATED ORAL at 05:36

## 2024-10-30 RX ADMIN — METHOCARBAMOL 500 MG: 500 TABLET ORAL at 01:06

## 2024-10-30 RX ADMIN — SENNOSIDES AND DOCUSATE SODIUM 1 TABLET: 8.6; 5 TABLET ORAL at 20:27

## 2024-10-30 RX ADMIN — SODIUM CHLORIDE: 9 INJECTION, SOLUTION INTRAVENOUS at 20:26

## 2024-10-30 RX ADMIN — POTASSIUM & SODIUM PHOSPHATES POWDER PACK 280-160-250 MG 1 PACKET: 280-160-250 PACK at 10:38

## 2024-10-30 RX ADMIN — MAGNESIUM OXIDE TAB 400 MG (241.3 MG ELEMENTAL MG) 400 MG: 400 (241.3 MG) TAB at 07:30

## 2024-10-30 RX ADMIN — SENNOSIDES AND DOCUSATE SODIUM 1 TABLET: 8.6; 5 TABLET ORAL at 07:30

## 2024-10-30 ASSESSMENT — VISUAL ACUITY
OU: BASELINE;GLASSES
OU: BASELINE;GLASSES;OTHER (SEE COMMENT)
OU: BASELINE;GLASSES

## 2024-10-30 NOTE — DISCHARGE SUMMARY
[facility] Discharge Summary and Instructions    Vinh Mccarty MRN# [id number]   Age: 69 year old YOB: 1955     Date of Admission:  10/28/2024  Date of Discharge::  11/1/2024  Admitting Physician:  Matthew Fox MD  Discharge Physician:  Matthew Fox MD          Admission Diagnoses:   Benign neoplasm of cerebral meninges (H) [D32.0]          Discharge Diagnosis:     Benign neoplasm of cerebral meninges (H) [D32.0]     Clinically Significant Risk Factors Present on Admission           # Hyperchloremia: Highest Cl = 114 mmol/L in last 2 days, will monitor as appropriate           # Hypertension: Noted on problem list      Cardiovascular: Cardiac Arrhythmia: Bradycardia, unspecified    Clinically pertinent cerebral edema which was evident on the CT/MRI and was treated with decadron (corticosteroids),           Procedures:   Procedures performed:  Right lateral suboccipital craniectomy   Right retrosigmoid approach for meningioma  Use of operative microscope  [place] of right abdominal fat graft  Titanium mesh cranioplasty < 5 cm           Brief History of Illness:   Mr. Mccarty is a 69 year old man s/p right clinoidal meningioma resection in 2019 after he was found to have vision loss. 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 Although he is asymptomatic from the petrous ridge tumor, surgery is offered. After all risks and benefits were discussed with the patient, he consented to the procedure.           Hospital Course:     Patient underwent above-mentioned procedure on 10/28/24.  Following the procedure the patient was transferred to the Intensive Care Unit.  Patient underwent MR imaging post-operatively revealing right dural venous sinus thrombosis extending from the distal right transverse sinus into  the proximal right internal jugular vein. The patient's course was complicated by asymptomatic bradycardia for which Cardiology was consulted, as well as sinus thromboses treated with IV fluids. Patient was started on dexamethasone post-operatively for 4 days, and IV fluids starting 10/29/24 for 2 days.  On post operative day 1 he was made floor status. On post operative day 4, he was ambulating, voiding without a smiley, eating a regular diet, pain was well controlled and therefore he was discharged home.          Discharge Medications:     Current Discharge Medication List        START taking these medications    Details   ondansetron (ZOFRAN ODT) 4 MG ODT tab Take 1 tablet (4 mg) by mouth every 8 hours as needed for nausea.  Qty: 20 tablet, Refills: 0    Associated Diagnoses: S/P craniotomy      oxyCODONE (ROXICODONE) 5 MG tablet Take 1 tablet (5 mg) by mouth every 6 hours as needed for breakthrough pain.  Qty: 12 tablet, Refills: 0    Associated Diagnoses: S/P craniotomy           CONTINUE these medications which have NOT CHANGED    Details   acetaminophen (TYLENOL) 325 MG tablet Take 2 tablets (650 mg) by mouth every 4 hours as needed for other (multimodal surgical pain management along with NSAIDS and opioid medication as indicated based on pain control and physical function.)  Qty: 1 Bottle, Refills: 0    Associated Diagnoses: S/P craniotomy      amLODIPine (NORVASC) 5 MG tablet Take 5 mg by mouth daily.      CRESTOR 10 MG tablet TAKE 1 TABLET (10 MG) BY MOUTH DAILY  Qty: 90 tablet, Refills: 0    Associated Diagnoses: Hyperlipidemia      omeprazole (PRILOSEC) 40 MG DR capsule TAKE 1 CAPSULE (40MG) BY MOUTH ONCE DAILY AS NEEDED FOR REFLUX      polyethylene glycol-propylene glycol (SYSTANE ULTRA) 0.4-0.3 % SOLN ophthalmic solution Place 1 drop into both eyes every hour as needed for dry eyes.      ramipril (ALTACE) 10 MG capsule Take 10 mg by mouth daily.      sodium chloride (OCEAN) 0.65 % nasal spray Spray 1  "spray in nostril daily as needed.              Exam:   Physical Exam  /63 (BP Location: Right arm)   Pulse 71   Temp 98.6  F (37  C) (Oral)   Resp 15   Ht 1.803 m (5' 11\")   Wt 74.6 kg (164 lb 7.4 oz)   SpO2 95%   BMI 22.94 kg/m      Gen: Appears comfortable, NAD  Incision: clean, dry, intact  Neurologic:  Alert & Oriented to person, place, time, and situation  Follows commands briskly  Speech fluent, spontaneous. No aphasia or dysarthria.  No gaze preference. No apparent hemineglect.  Right eye not able to test VA but perceiving shapes and light  Left eye VA 20/80  Face symmetric with sensation intact to light touch  Palate elevates symmetrically, uvula midline, tongue protrudes midline  Trapezii muscles 5/5 bilaterally  No pronator drift       Del Tr Bi WE WF Gr   R 5 5 5 5 5 5   L 5 5 5 5 5 5     HF KE KF DF PF EHL   R 5 5 5 5 5 5   L 5 5 5 5 5 5      Reflexes 2+ throughout  Sensation intact and symmetric to light touch throughout         Discharge Instructions and Follow-Up:     Discharge diet: Regular     Discharge activity: You may advance activity as tolerated. No strenuous exercise or heay lifting greater than 10 lbs for 4 weeks or until seen and cleared in clinic.       Discharge follow-up: Follow-up with Neurosurgery DAV[facility] in 2 weeks virtually for wound check/post-hospital evaluation (on 11/15/24).    Follow with your PCP for wound check and suture removal 2 weeks from surgery    Follow-up Dr. Matthew Fox MD in 3 months with MRI brain prior.     Wound care: Ok to shower,however no scrubbing of the wound and no soaking of the wound, meaning no bathtubs or swimming pools. Pat dry only. Leave wound open to air.  Patient to have wound checked 2 weeks following surgery.    Wound location: RIGHT posterior scalp  Closure technique: Nylon  Dressing needs: None  Post-op care at follow-up: Keep dry and clean       Please call if you have:  1. increased pain, redness, drainage, swelling at " your incision  2. fevers > 101.5 F degrees  3. with any questions or concerns.  You may reach the Neurosurgery clinic at [phone] during regular work hours. ER at [phone].    and ask for the Neurosurgery Resident on call at [phone], during off hours or weekends.         Discharge Disposition:     Discharged to home          DANUTA ZAPATA MD  PGY2 Neurosurgery    Desirae Anders PA-C  Neurosurgery Department  Pager: [phone]

## 2024-10-30 NOTE — PLAN OF CARE
Goal Outcome Evaluation:       Neuro remains intact, a/ox4. Minimal nausea with tolerated transition to regular diet. Intermittently incontinent with smiley out, 1 BM. Ambulated in [place] with rehab. VSS. Replaced potassium, phos. Pending transfer orders.

## 2024-10-30 NOTE — PROGRESS NOTES
[facility], [place]   10/30/2024  Neurosurgery Progress Note:    Interval History:   No acute events overnight  MRI completed, demonstrating right sided venous sinus thrombosis from distal transverse sinus to sigmoid sinus, headaches thus far well managed, and patient started on IV fluids    Assessment:  Vinh Mccarty is a 69 year old male, hx of HTN, HLD prior resection of right clinoidal meningioma, who is now:    POD 2 from right retrosigmoid craniotomy for resection of petrous ridge meningioma (10/28/2024)    Plan:  Continue IV fluids  Tylenol/Oxycodone PRN for pain, will try caffeine pills for HA  Decadron 4 day taper  HOB > 30  SBP < 140 with PRNs  Bowel regimen  Advance diet as tolerated  Electrolyte replacement protocols  Standard coag goals Plt>100K, INR<1.4, Hgb>7  SCDs for DVT ppx  PT/OT  Floor status    Staff: Dr. Fox    -----------------------------------  Silviano Ryder MD  Neurosurgery  Pager: [phone]  Please page/[facility] on-call neurosurgery with questions  -----------------------------------    Objective:   Temp:  [97.6  F (36.4  C)-98.8  F (37.1  C)] 98.7  F (37.1  C)  Pulse:  [] 71  Resp:  [16-18] 18  BP: (100-143)/(52-85) 113/63  SpO2:  [94 %-99 %] 95 %  I/O last 3 completed shifts:  In: 1921 [P.O.:860; I.V.:1061]  Out: 1170 [Urine:1170]    Gen: Appears comfortable, NAD  Incision: clean, dry, intact  Neurologic:  Alert & Oriented to person, place, time, and situation  Follows commands briskly  Speech fluent, spontaneous. No aphasia or dysarthria.  No gaze preference. No apparent hemineglect.  Right eye not able to test VA but perceiving shapes and light  Left eye VA 20/80  Face symmetric with sensation intact to light touch  Palate elevates symmetrically, uvula midline, tongue protrudes midline  Trapezii muscles 5/5 bilaterally  No pronator drift     Del Tr Bi WE WF Gr   R 5 5 5 5 5 5   L 5 5 5 5 5 5    HF KE KF DF PF EHL   R 5 5 5 5 5 5   L 5 5 5 5 5 5      Reflexes 2+ throughout  Sensation intact and symmetric to light touch throughout    LABS:  Recent Labs   Lab 10/30/24  0526 10/29/24  0613 10/28/24  2135    141  --    POTASSIUM 3.9 3.7  --    CHLORIDE 114* 111*  --    CO2 21* 19*  --    ANIONGAP 10 11  --    * 138* 148*   BUN 10.5 7.6*  --    CR 0.64* 0.64*  --    BRAD 8.7* 8.4*  --      Recent Labs   Lab 10/30/24  0526   WBC 12.0*   RBC 4.17*   HGB 12.8*   HCT 37.7*   MCV 90   MCH 30.7   MCHC 34.0   RDW 12.8   *       IMAGING:  Recent Results (from the past 24 hours)   MR Brain w/o & w Contrast   Result Value    Radiologist flags (Urgent)     Right dural venous sinus thrombosis extending from the    Narrative    EXAM: MR BRAIN W/O & W CONTRAST  10/29/2024 4:38 PM     HISTORY: s/p petrous ridge meningioma resection       COMPARISON: Outside MRI 5/30/2024    TECHNIQUE: Sagittal and axial T1-weighted, axial diffusion,  multiplanar T2 FLAIR with fat saturation images were obtained without  intravenous contrast. Following intravenous gadolinium-based contrast  administration, axial T2-weighted, axial susceptibility, and  T1-weighted images (in multiple planes) were obtained.    CONTRAST: 7.5 mL Gadavist.    FINDINGS:  Postoperative changes of right lateral suboccipital craniectomy. Trace  post surgical pneumocephalus. Trace blood products in the resection  site along the right petrous apex. No evidence of residual mass. Dural  venous sinus thrombosis of the distal right transverse sinus extending  to the proximal right internal jugular vein. There are suspected  filling defects along the anterior cervical epidural venous plexus  (13/70, 13/86, 101/116).    There is no mass effect, midline shift, or intracranial hemorrhage.  Scattered T2 hyperintensities in the white matter. Focal T2  hyperintensity of the left cerebellar hemisphere is similar to prior.  The ventricles are proportionate to the cerebral sulci. Diffusion and  susceptibility weighted  images are negative for acute/focal  abnormality. Major intracranial arteries are within normal limits.    No suspicious abnormality of the skull marrow signal. Clear paranasal  sinuses. Mastoid air cells are clear. No focal abnormality of the  pituitary gland, sella, skull base and upper cervical spinal  structures on sagittal images. The orbits are normal.      Impression    IMPRESSION:  1. Right dural venous sinus thrombosis extending from the distal right  transverse sinus into the proximal right internal jugular vein.  2. Areas of filling defect along the anterior epidural venous plexus  of the cervical spine could represent areas of thrombosis versus  artifact.  3. Postoperative changes of right lateral suboccipital craniectomy for  meningioma resection without evidence of residual tumor.    [Urgent Result: Right dural venous sinus thrombosis extending from the  distal right transverse sinus into the proximal right internal jugular  vein]    Finding was identified on 10/29/2024 6:36 PM.     Dr. Sher was contacted by Dr. Gabe Cuba at 10/29/2024 6:38 PM  and verbalized understanding of the urgent finding.     I have personally reviewed the examination and initial interpretation  and I agree with the findings.    SONJA COLLAZO MD         SYSTEM ID:  [id number]     I have reviewed the history. I have seen and examined the patient myself and agree with the assessment and plan above.  SHAHLA Fox MD

## 2024-10-30 NOTE — PLAN OF CARE
Major Shift Events:  A&Ox4, PEERLA, ambulating SBA, follows commands, baseline blurry vision in R eye. Reporting intermittent headache, given scheduled tylenol. NSR/xiomy, 40-80s, SBP goal < 140 maintained without intervention, afebrile. On RA, LS clear. Clear liquid diet, minimal oral intake d/t nausea- scheduled zofran and PRN compazine. Voiding adequate output, no BM yet - given senna. NS @ 100.     Plan:  transfer to floor, continue plan of care.     For vital signs and complete assessments, please see documentation flowsheets.     Problem: Infection  Goal: Absence of Infection Signs and Symptoms  Intervention: Prevent or Manage Infection  Recent Flowsheet Documentation  Taken 10/30/2024 0400 by Shanita Wright RN  Infection Management: aseptic technique maintained  Taken 10/30/2024 0000 by Shanita Wright RN  Infection Management: aseptic technique maintained  Taken 10/29/2024 2000 by Shanita Wright RN  Infection Management: aseptic technique maintained     Problem: Adult Inpatient Plan of Care  Goal: Optimal Comfort and Wellbeing  Intervention: Provide Person-Centered Care  Recent Flowsheet Documentation  Taken 10/30/2024 0400 by Shanita Wright RN  Trust Relationship/Rapport:   care explained   choices provided   emotional support provided  Taken 10/30/2024 0000 by Shanita Wright RN  Trust Relationship/Rapport:   care explained   choices provided   emotional support provided  Taken 10/29/2024 2000 by Shanita Wright RN  Trust Relationship/Rapport:   care explained   choices provided   emotional support provided   Goal Outcome Evaluation:

## 2024-10-30 NOTE — CONSULTS
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 Cardiology is now consulted for input regarding incidentally noted sinus bradycardia and sinus arrhythmia    Sinus bradycardia: Reviewed the available telemetry and EKG. These show sinus bradycardia with sinus arrhythmia. These are benign findings while the patient was sleeping (asymptomatic) and likely occurred in the setting of high vagal tone. No pauses noted. No further workup is required     Patient was seen and plan of care was discussed with attending physician Dr. Carey.   We will sign off  Please don't hesitate to contact our team with any additional questions or concerns.    Jered Mason MD  Cardiology Fellow    I have seen, interviewed, and examined patient. I reviewed the management plan with the patient.  I have reviewed the laboratory tests, imaging, and other investigations.  Discussed with the team and agree with the findings and plan in this resident/fellow/PA-C/nurse practitioner's note. In addition, changes in the physical examination, assessment and plan have been incorporated into the note by myself, as to make it a single cohesive document. Plan was communicated to referring team/physician.      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 Cardiology is now consulted for input regarding incidentally noted sinus bradycardia and sinus arrhythmia    On interview with the patient, he reports feeling well. Has known ERNESTINA but is  noncompliant with cpap. Had no awareness of overnight bradycardia. No hx of cardiac disease. No syncope or presyncope.      Review of Systems:  A comprehensive ROS was performed and found to be negative or non-contributory with the exception of that noted in the HPI above.    Past Medical History:   Diagnosis Date    HTN (hypertension) 10/10/2011    hyperlipidemia 10/10/2011     Past Surgical History:   Procedure Laterality Date    ARTHROSCOPY KNEE  2011    LT; torn menicus knee    bakers cyst / removed  2011    LT    COLONOSCOPY  11/2011    CRANIOTOMY, EXCISE TUMOR COMPLEX, COMBINED Right 7/25/2019    Procedure: Right Frontotemporal Craniotomy And Resection Of Tumor;  Surgeon: Matthew Fox MD;  Location: [facility] OR    CRANIOTOMY, EXCISE TUMOR COMPLEX, COMBINED Right 10/28/2024    Procedure: Right retrosigmoid approach for resection of meningioma;  Surgeon: Matthew Fox MD;  Location: [facility] OR    ELBOW SURGERY      Removal of pin    GLAUCOMA SURGERY      PROCURE GRAFT FAT Right 10/28/2024    Procedure: SURGICAL PROCUREMENT, Abdominal FAT GRAFT;  Surgeon: Matthew Fox MD;  Location: [facility] OR    surgically repaired/pins  1979    rt elbow fx     Social History     Socioeconomic History    Marital status:      Spouse name: None    Number of children: None    Years of education: None    Highest education level: None   Tobacco Use    Smoking status: Never    Smokeless tobacco: Never   Substance and Sexual Activity    Alcohol use: Yes     Comment: socially    Drug use: Yes     Types: Marijuana     Comment: occasionally   Other Topics Concern    Blood Transfusions Yes    Caffeine Concern Yes     Comment: coffee > 32 oz    Exercise Yes     Comment: walking    Parent/sibling w/ CABG, MI or angioplasty before 65F 55M? No     Social Drivers of Health     Financial Resource Strain: Low Risk  (10/28/2024)    Financial Resource Strain     Within the past 12 months, have you or your  family members you live with been unable to get utilities (heat, electricity) when it was really needed?: No   Food Insecurity: Low Risk  (10/28/2024)    Food Insecurity     Within the past 12 months, did you worry that your food would run out before you got money to buy more?: No     Within the past 12 months, did the food you bought just not last and you didn t have money to get more?: No   Transportation Needs: Low Risk  (10/28/2024)    Transportation Needs     Within the past 12 months, has lack of transportation kept you from medical appointments, getting your medicines, non-medical meetings or appointments, work, or from getting things that you need?: No   Interpersonal Safety: Low Risk  (10/28/2024)    Interpersonal Safety     Do you feel physically and emotionally safe where you currently live?: Yes     Within the past 12 months, have you been hit, slapped, kicked or otherwise physically hurt by someone?: No     Within the past 12 months, have you been humiliated or emotionally abused in other ways by your partner or ex-partner?: No   Housing Stability: High Risk (10/28/2024)    Housing Stability     Do you have housing? : No     Are you worried about losing your housing?: No      Family History   Problem Relation Age of Onset    Heart Disease Father         CHF    Diabetes Father     Other - See Comments Father 62        emphysema    Respiratory Father     Cerebrovascular Disease Mother 94    Other - See Comments Brother         emphysema    Asthma No family hx of     Anesthesia Reaction No family hx of     Deep Vein Thrombosis (DVT) No family hx of      Medications Prior to Admission   Medication Sig Dispense Refill Last Dose/Taking    acetaminophen (TYLENOL) 325 MG tablet Take 2 tablets (650 mg) by mouth every 4 hours as needed for other (multimodal surgical pain management along with NSAIDS and opioid medication as indicated based on pain control and physical function.) 1 Bottle 0 Past Month     amLODIPine (NORVASC) 5 MG tablet Take 5 mg by mouth daily.   10/27/2024    CRESTOR 10 MG tablet TAKE 1 TABLET (10 MG) BY MOUTH DAILY 90 tablet 0 10/27/2024    omeprazole (PRILOSEC) 40 MG DR capsule TAKE 1 CAPSULE (40MG) BY MOUTH ONCE DAILY AS NEEDED FOR REFLUX   More than a month    polyethylene glycol-propylene glycol (SYSTANE ULTRA) 0.4-0.3 % SOLN ophthalmic solution Place 1 drop into both eyes every hour as needed for dry eyes.   Past Week    ramipril (ALTACE) 10 MG capsule Take 10 mg by mouth daily.   10/27/2024    sodium chloride (OCEAN) 0.65 % nasal spray Spray 1 spray in nostril daily as needed.   Past Week     Current Facility-Administered Medications   Medication Dose Route Frequency Provider Last Rate Last Admin    [START ON 10/31/2024] acetaminophen (TYLENOL) tablet 650 mg  650 mg Oral Q4H PRN Desirae Anders PA-C        acetaminophen (TYLENOL) tablet 975 mg  975 mg Oral Q8H Desirae Anders PA-C   975 mg at 10/30/24 0536    [Held by provider] amLODIPine (NORVASC) tablet 5 mg  5 mg Oral Daily Desirae Anders PA-C        [START ON 10/31/2024] bisacodyl (DULCOLAX) suppository 10 mg  10 mg Rectal Daily PRN Desirae Anders PA-C        caffeine (NO-DOZE) tablet 200 mg  200 mg Oral Daily PRN Елена Ramirez MD        [START ON 10/31/2024] dexAMETHasone (DECADRON) tablet 4 mg  4 mg Oral Daily Mason Sher MD        Followed by    [START ON 11/1/2024] dexAMETHasone (DECADRON) tablet 2 mg  2 mg Oral Daily Mason Sher MD        hydrALAZINE (APRESOLINE) injection 10-20 mg  10-20 mg Intravenous Q30 Min PRN Desirae Anders PA-C        HYDROmorphone (DILAUDID) injection 0.2 mg  0.2 mg Intravenous Q2H PRN Desirae Anders PA-C   0.2 mg at 10/29/24 0411    Or    HYDROmorphone (DILAUDID) injection 0.4 mg  0.4 mg Intravenous Q2H PRN Desirae Anders PA-C        labetalol (NORMODYNE/TRANDATE) injection 10-20 mg  10-20 mg Intravenous Q10 Min PRN Desirae Anders PA-C         lidocaine (LMX4) cream   Topical Q1H PRN Desirae Anders PA-C        lidocaine 1 % 0.1-1 mL  0.1-1 mL Other Q1H PRN Desirae Anders PA-C        magnesium hydroxide (MILK OF MAGNESIA) suspension 30 mL  30 mL Oral Daily PRN Desirae Anders PA-C        magnesium oxide (MAG-OX) tablet 400 mg  400 mg Oral Q4H Matthew Fox MD   400 mg at 10/30/24 0730    methocarbamol (ROBAXIN) tablet 500 mg  500 mg Oral Q6H PRN Desirae Anders PA-C   500 mg at 10/30/24 0106    midazolam (VERSED) injection 1 mg  1 mg Intravenous Once PRN Mason Sher MD        naloxone (NARCAN) injection 0.2 mg  0.2 mg Intravenous Q2 Min PRN Matthew Fox MD        Or    naloxone (NARCAN) injection 0.4 mg  0.4 mg Intravenous Q2 Min PRN Matthew Fox MD        Or    naloxone (NARCAN) injection 0.2 mg  0.2 mg Intramuscular Q2 Min PRN Matthew Fox MD        Or    naloxone (NARCAN) injection 0.4 mg  0.4 mg Intramuscular Q2 Min PRN Matthew Fox MD        ondansetron (ZOFRAN ODT) ODT tab 4 mg  4 mg Oral Q6H Mason Sher MD   4 mg at 10/30/24 0326    Or    ondansetron (ZOFRAN) injection 4 mg  4 mg Intravenous Q6H Mason Sher MD   4 mg at 10/29/24 2129    oxyCODONE (ROXICODONE) tablet 5 mg  5 mg Oral Q4H PRN Desirae Anders PA-C        Or    oxyCODONE IR (ROXICODONE) tablet 10 mg  10 mg Oral Q4H PRN Desirae Anders PA-C        pantoprazole (PROTONIX) EC tablet 40 mg  40 mg Oral Daily PRN Desirae Anders PA-CAYLA        polyethylene glycol (MIRALAX) Packet 17 g  17 g Oral Daily Desirae Anders PA-C   17 g at 10/30/24 0731    potassium & sodium phosphates (NEUTRA-PHOS) Packet 1 packet  1 packet Oral or Feeding Tube Q4H Matthew Fox MD   1 packet at 10/30/24 0730    prochlorperazine (COMPAZINE) injection 5 mg  5 mg Intravenous Q6H PRN Mason Sher MD   5 mg at 10/29/24 2547    Or    prochlorperazine (COMPAZINE) tablet  "5 mg  5 mg Oral Q6H PRN Mason Sher MD   5 mg at 10/30/24 0537    Or    prochlorperazine (COMPAZINE) suppository 12.5 mg  12.5 mg Rectal Q12H PRN Mason Sher MD        [Held by provider] ramipril (ALTACE) capsule 10 mg  10 mg Oral Daily Desirae Anders PA-C        rosuvastatin (CRESTOR) tablet 10 mg  10 mg Oral Daily Desirae Anders, PA-C   10 mg at 10/30/24 0731    senna-docusate (SENOKOT-S/PERICOLACE) 8.6-50 MG per tablet 1 tablet  1 tablet Oral BID Desirae Anders PA-C   1 tablet at 10/30/24 0730    sodium chloride (OCEAN) 0.65 % nasal spray 1 spray  1 spray Nasal Daily PRN Desirae Anders, PA-C        sodium chloride (PF) 0.9% PF flush 3 mL  3 mL Intracatheter Q8H Desirae Anders, PA-C        sodium chloride (PF) 0.9% PF flush 3 mL  3 mL Intracatheter q1 min prn Desirae Anders, PA-C        sodium chloride 0.9 % infusion   Intravenous Continuous Ying Sparks MD             Objective   Physical Exam:    Blood pressure 113/63, pulse 71, temperature 98.7  F (37.1  C), temperature source Oral, resp. rate 18, height 1.803 m (5' 11\"), weight 74.6 kg (164 lb 7.4 oz), SpO2 95%.    Exam:  General: NAD  HEENT: no scleral icterus or injection  CARDIAC: RRR, no murmurs. No JVD  RESP:  CTAB  GI: nondistended  : No smiley  EXTREMITIES: no LE edema  SKIN: No acute lesions appreciated  NEURO: No focal deficits    Labs & Studies: I personally reviewed and interpreted the following studies:  CMP  Recent Labs   Lab 10/30/24  0526 10/29/24  0613 10/28/24  2135 10/28/24  0628    141  --   --    POTASSIUM 3.9 3.7  --   --    CHLORIDE 114* 111*  --   --    CO2 21* 19*  --   --    ANIONGAP 10 11  --   --    * 138* 148* 108*   BUN 10.5 7.6*  --   --    CR 0.64* 0.64*  --   --    GFRESTIMATED >90 >90  --   --    BRAD 8.7* 8.4*  --   --    MAG 2.0  --   --   --    PHOS 2.1*  --   --   --      CBC  Recent Labs   Lab 10/30/24  0526 10/29/24  0613   WBC 12.0* 11.1*   RBC 4.17* 4.46 " "  HGB 12.8* 13.6   HCT 37.7* 40.2   MCV 90 90   MCH 30.7 30.5   MCHC 34.0 33.8   RDW 12.8 12.9   * 166     BNPNo lab results found in last 7 days.  HsTropInvalid input(s): \"TROP\"    EKG:   Sinus bradycardia with sinus arrhythmia         "

## 2024-10-31 ENCOUNTER — APPOINTMENT (OUTPATIENT)
Dept: SPEECH THERAPY | Facility: CLINIC | Age: 69
DRG: 025 | End: 2024-10-31
Payer: MEDICARE

## 2024-10-31 ENCOUNTER — APPOINTMENT (OUTPATIENT)
Dept: PHYSICAL THERAPY | Facility: CLINIC | Age: 69
DRG: 025 | End: 2024-10-31
Payer: MEDICARE

## 2024-10-31 LAB
ANION GAP SERPL CALCULATED.3IONS-SCNC: 8 MMOL/L (ref 7–15)
BUN SERPL-MCNC: 11.5 MG/DL (ref 8–23)
CALCIUM SERPL-MCNC: 8.4 MG/DL (ref 8.8–10.4)
CHLORIDE SERPL-SCNC: 110 MMOL/L (ref 98–107)
CREAT SERPL-MCNC: 0.62 MG/DL (ref 0.67–1.17)
EGFRCR SERPLBLD CKD-EPI 2021: >90 ML/MIN/1.73M2
ERYTHROCYTE [DISTWIDTH] IN BLOOD BY AUTOMATED COUNT: 12.7 % (ref 10–15)
GLUCOSE SERPL-MCNC: 100 MG/DL (ref 70–99)
HCO3 SERPL-SCNC: 21 MMOL/L (ref 22–29)
HCT VFR BLD AUTO: 37.5 % (ref 40–53)
HGB BLD-MCNC: 12.8 G/DL (ref 13.3–17.7)
MAGNESIUM SERPL-MCNC: 2.1 MG/DL (ref 1.7–2.3)
MCH RBC QN AUTO: 30.5 PG (ref 26.5–33)
MCHC RBC AUTO-ENTMCNC: 34.1 G/DL (ref 31.5–36.5)
MCV RBC AUTO: 90 FL (ref 78–100)
PHOSPHATE SERPL-MCNC: 1.8 MG/DL (ref 2.5–4.5)
PHOSPHATE SERPL-MCNC: 2.8 MG/DL (ref 2.5–4.5)
PLATELET # BLD AUTO: 133 10E3/UL (ref 150–450)
POTASSIUM SERPL-SCNC: 3.7 MMOL/L (ref 3.4–5.3)
RBC # BLD AUTO: 4.19 10E6/UL (ref 4.4–5.9)
SODIUM SERPL-SCNC: 139 MMOL/L (ref 135–145)
WBC # BLD AUTO: 10 10E3/UL (ref 4–11)

## 2024-10-31 PROCEDURE — 250N000013 HC RX MED GY IP 250 OP 250 PS 637

## 2024-10-31 PROCEDURE — 250N000011 HC RX IP 250 OP 636

## 2024-10-31 PROCEDURE — 80048 BASIC METABOLIC PNL TOTAL CA: CPT

## 2024-10-31 PROCEDURE — 120N000002 HC R&B MED SURG/OB UMMC

## 2024-10-31 PROCEDURE — 97530 THERAPEUTIC ACTIVITIES: CPT | Mod: GP | Performed by: PHYSICAL THERAPIST

## 2024-10-31 PROCEDURE — 97116 GAIT TRAINING THERAPY: CPT | Mod: GP | Performed by: PHYSICAL THERAPIST

## 2024-10-31 PROCEDURE — 92610 EVALUATE SWALLOWING FUNCTION: CPT | Mod: GN

## 2024-10-31 PROCEDURE — 85018 HEMOGLOBIN: CPT

## 2024-10-31 PROCEDURE — 84100 ASSAY OF PHOSPHORUS: CPT | Performed by: NEUROLOGICAL SURGERY

## 2024-10-31 PROCEDURE — 83735 ASSAY OF MAGNESIUM: CPT

## 2024-10-31 PROCEDURE — 85049 AUTOMATED PLATELET COUNT: CPT

## 2024-10-31 PROCEDURE — 258N000003 HC RX IP 258 OP 636

## 2024-10-31 PROCEDURE — 84100 ASSAY OF PHOSPHORUS: CPT

## 2024-10-31 PROCEDURE — 36415 COLL VENOUS BLD VENIPUNCTURE: CPT | Performed by: NEUROLOGICAL SURGERY

## 2024-10-31 PROCEDURE — 97161 PT EVAL LOW COMPLEX 20 MIN: CPT | Mod: GP | Performed by: PHYSICAL THERAPIST

## 2024-10-31 PROCEDURE — 250N000013 HC RX MED GY IP 250 OP 250 PS 637: Performed by: NURSE PRACTITIONER

## 2024-10-31 PROCEDURE — 36415 COLL VENOUS BLD VENIPUNCTURE: CPT

## 2024-10-31 PROCEDURE — 250N000012 HC RX MED GY IP 250 OP 636 PS 637

## 2024-10-31 PROCEDURE — 92526 ORAL FUNCTION THERAPY: CPT | Mod: GN

## 2024-10-31 PROCEDURE — 250N000013 HC RX MED GY IP 250 OP 250 PS 637: Performed by: NEUROLOGICAL SURGERY

## 2024-10-31 RX ORDER — OXYCODONE HYDROCHLORIDE 5 MG/1
5 TABLET ORAL EVERY 6 HOURS PRN
Qty: 12 TABLET | Refills: 0 | Status: SHIPPED | OUTPATIENT
Start: 2024-10-31 | End: 2024-11-01

## 2024-10-31 RX ORDER — PANTOPRAZOLE SODIUM 40 MG/1
40 TABLET, DELAYED RELEASE ORAL DAILY
Qty: 10 TABLET | Refills: 0 | Status: SHIPPED | OUTPATIENT
Start: 2024-10-31 | End: 2024-11-01

## 2024-10-31 RX ORDER — PANTOPRAZOLE SODIUM 40 MG/1
40 TABLET, DELAYED RELEASE ORAL DAILY
Status: DISCONTINUED | OUTPATIENT
Start: 2024-10-31 | End: 2024-11-01 | Stop reason: HOSPADM

## 2024-10-31 RX ORDER — ONDANSETRON 4 MG/1
4 TABLET, ORALLY DISINTEGRATING ORAL EVERY 8 HOURS PRN
Qty: 20 TABLET | Refills: 0 | Status: SHIPPED | OUTPATIENT
Start: 2024-10-31 | End: 2024-11-14

## 2024-10-31 RX ORDER — POTASSIUM CHLORIDE 750 MG/1
20 TABLET, EXTENDED RELEASE ORAL ONCE
Status: COMPLETED | OUTPATIENT
Start: 2024-10-31 | End: 2024-10-31

## 2024-10-31 RX ADMIN — SENNOSIDES AND DOCUSATE SODIUM 1 TABLET: 8.6; 5 TABLET ORAL at 21:33

## 2024-10-31 RX ADMIN — POTASSIUM & SODIUM PHOSPHATES POWDER PACK 280-160-250 MG 2 PACKET: 280-160-250 PACK at 08:37

## 2024-10-31 RX ADMIN — HYDRALAZINE HYDROCHLORIDE 20 MG: 20 INJECTION INTRAMUSCULAR; INTRAVENOUS at 10:00

## 2024-10-31 RX ADMIN — SODIUM CHLORIDE: 9 INJECTION, SOLUTION INTRAVENOUS at 06:09

## 2024-10-31 RX ADMIN — POTASSIUM CHLORIDE 20 MEQ: 750 TABLET, EXTENDED RELEASE ORAL at 10:00

## 2024-10-31 RX ADMIN — POTASSIUM & SODIUM PHOSPHATES POWDER PACK 280-160-250 MG 2 PACKET: 280-160-250 PACK at 14:11

## 2024-10-31 RX ADMIN — ACETAMINOPHEN 975 MG: 325 TABLET, FILM COATED ORAL at 14:11

## 2024-10-31 RX ADMIN — ACETAMINOPHEN 975 MG: 325 TABLET, FILM COATED ORAL at 06:07

## 2024-10-31 RX ADMIN — ROSUVASTATIN CALCIUM 10 MG: 10 TABLET, FILM COATED ORAL at 08:37

## 2024-10-31 RX ADMIN — DEXAMETHASONE 4 MG: 4 TABLET ORAL at 08:37

## 2024-10-31 RX ADMIN — POTASSIUM & SODIUM PHOSPHATES POWDER PACK 280-160-250 MG 2 PACKET: 280-160-250 PACK at 17:58

## 2024-10-31 RX ADMIN — PROCHLORPERAZINE MALEATE 5 MG: 5 TABLET ORAL at 06:55

## 2024-10-31 RX ADMIN — PANTOPRAZOLE SODIUM 40 MG: 40 TABLET, DELAYED RELEASE ORAL at 14:12

## 2024-10-31 RX ADMIN — SENNOSIDES AND DOCUSATE SODIUM 1 TABLET: 8.6; 5 TABLET ORAL at 08:37

## 2024-10-31 NOTE — PROGRESS NOTES
Transferred to  at 1015  Belongings: Sent with patient   Chart and medications sent with patient Yes  Family notified: Yes     Report given to receiving RN.

## 2024-10-31 NOTE — PROGRESS NOTES
Arrived from: 4e  Belongings/meds: No belongings with patient, belongings with wife.   2 RN Skin Assessment Completed by: Meseret PAGAN and Talisha KRAUSE  Skin Findings: R crani and R abdom. Scarring on shins.   Non-intact findings documented (yes/no/NA): NO

## 2024-10-31 NOTE — PLAN OF CARE
Major Shift Events: Floor orders.  Neuro: A&O4. Pupils E/R. 5/5 strength in all extremities. Complains of a mild 1/10 headache.  CV: SR/SB overnight. Bradycardia asymptomatic and reviewed by cardiology. SBP < 140. Afebrile.  Resp: RA. LS clear.  GI: Regular diet. LBM 10/30. Intermittent nausea, patient denied overnight.   : Voiding with urinal overnight.   Pain: Scheduled tylenol. PRN caffeine pills available.  Skin/Drains: R crani site, JAMES. R abd graft site, primapore CDI.  Lines/Drips: L PIV w NS at 100.  Plan: Floor orders vs discharge.  For vital signs and complete assessments, please see documentation flowsheets.   Problem: Risk for Delirium  Goal: Optimal Coping  Outcome: Progressing  Intervention: Optimize Psychosocial Adjustment to Delirium  Recent Flowsheet Documentation  Taken 10/31/2024 0000 by Lalito Bullard RN  Supportive Measures: active listening utilized  Family/Support System Care:   presence promoted   involvement promoted  Taken 10/30/2024 2000 by Lalito Bullard RN  Supportive Measures: active listening utilized  Family/Support System Care:   presence promoted   involvement promoted  Goal: Improved Sleep  Outcome: Progressing  Intervention: Promote Sleep  Recent Flowsheet Documentation  Taken 10/31/2024 0000 by Lalito Bullard, RN  Sleep/Rest Enhancement: awakenings minimized  Taken 10/30/2024 2000 by Lalito Bullard RN  Sleep/Rest Enhancement: awakenings minimized     Problem: Pain Acute  Goal: Optimal Pain Control and Function  Outcome: Progressing  Intervention: Optimize Psychosocial Wellbeing  Recent Flowsheet Documentation  Taken 10/31/2024 0000 by Lalito Bullard, RN  Supportive Measures: active listening utilized  Taken 10/30/2024 2000 by Lalito Bullard RN  Supportive Measures: active listening utilized  Intervention: Develop Pain Management Plan  Recent Flowsheet Documentation  Taken 10/30/2024 2000 by Lalito Bullard, RN  Pain Management Interventions: declines  Intervention: Prevent  or Manage Pain  Recent Flowsheet Documentation  Taken 10/31/2024 0000 by Lalito Bullard, RN  Sleep/Rest Enhancement: awakenings minimized  Bowel Elimination Promotion: adequate fluid intake promoted  Medication Review/Management: medications reviewed  Taken 10/30/2024 2000 by Lalito Bullard, RN  Sleep/Rest Enhancement: awakenings minimized  Bowel Elimination Promotion: adequate fluid intake promoted  Medication Review/Management: medications reviewed     Problem: Infection  Goal: Absence of Infection Signs and Symptoms  Outcome: Progressing  Intervention: Prevent or Manage Infection  Recent Flowsheet Documentation  Taken 10/31/2024 0000 by Lalito Bullard, RN  Infection Management: aseptic technique maintained  Taken 10/30/2024 2000 by Lalito Bullard, RN  Infection Management: aseptic technique maintained

## 2024-10-31 NOTE — PLAN OF CARE
Took over patient's care from [id number]    Status: POD 3 from right retrosigmoid craniotomy for resection of petrous   ridge meningioma (10/28/2024)   Vitals: VSS except htn bordering parameter.   Neuros: A&Ox4, 5/5 strengths. R vision loss/blurriness at baseline  IV: L PIV infusing at 100ml/hr, due to stop at 0001 11/1  Resp/trach: RA  Diet: Regular  Bowel status: LBM 10/30  : voiding with urinal frequently.   Skin: R crani side, R abd incision  Pain: well controlled per patient, DVPRS of 2-3. Scheduled tylenol. Caffeine tablets as option for headache.   Activity: SBA  Social: Wife at bedside  Plan: decadron taper with fluids for venous sinus thrombosis. Plan to discharge home with assist tomorrow by 11am, as pt has a four hour drive.

## 2024-10-31 NOTE — PROGRESS NOTES
10/31/24 2814   Appointment Info   Signing Clinician's Name / Credentials (SLP) Laura Pimentel MS CCC-SLP   General Information   Onset of Illness/Injury or Date of Surgery 10/28/24   Referring Physician Desirae Anders PA-C   Patient/Family Therapy Goal Statement (SLP) None stated   Pertinent History of Current Problem Vinh Mccarty is a 69 year old male, hx of HTN, HLD prior resection of right clinoidal meningioma, who is now:     POD 3 from right retrosigmoid craniotomy for resection of petrous ridge meningioma (10/28/2024). Clinical swallow eval completed per MD orders to further assess oropharyngeal swallow function.   Type of Evaluation   Type of Evaluation Swallow Evaluation   Oral Motor   Oral Musculature generally intact   Structural Abnormalities none present   Mucosal Quality adequate   Dentition (Oral Motor)   Dentition (Oral Motor) adequate dentition   Facial Symmetry (Oral Motor)   Facial Symmetry (Oral Motor) WNL   Lip Function (Oral Motor)   Lip Range of Motion (Oral Motor) WNL   Tongue Function (Oral Motor)   Tongue ROM (Oral Motor) WNL   Jaw Function (Oral Motor)   Jaw Function (Oral Motor) WNL   Cough/Swallow/Gag Reflex (Oral Motor)   Soft Palate/Velum (Oral Motor) WNL   Volitional Throat Clear/Cough (Oral Motor) WNL   Vocal Quality/Secretion Management (Oral Motor)   Vocal Quality (Oral Motor) WFL   Secretion Management (Oral Motor) WNL   General Swallowing Observations   Past History of Dysphagia No hx of dysphagia per chart review. Pt reported some trouble swallowing peanut butter toast this morning.   Respiratory Support room air   Current Diet/Method of Nutritional Intake (General Swallowing Observations, NIS) thin liquids (level 0);regular diet   Swallowing Evaluation Clinical swallow evaluation   Clinical Swallow Evaluation   Feeding Assistance no assistance needed   Clinical Swallow Evaluation Textures Trialed thin liquids;pureed;solid foods   Clinical Swallow Eval: Thin  Liquid Texture Trial   Mode of Presentation, Thin Liquids self-fed;cup   Volume of Liquid or Food Presented 8 oz   Oral Phase of Swallow WFL   Pharyngeal Phase of Swallow intact   Diagnostic Statement No overt s/sx of aspiration   Clinical Swallow Evaluation: Puree Solid Texture Trial   Mode of Presentation, Puree spoon;self-fed   Volume of Puree Presented 3 tbsp   Oral Phase, Puree WFL   Pharyngeal Phase, Puree intact   Diagnostic Statement No overt s/sx of aspiration   Clinical Swallow Evaluation: Solid Food Texture Trial   Mode of Presentation self-fed   Volume Presented 3 tbsp   Oral Phase WFL   Pharyngeal Phase intact   Diagnostic Statement No overt s/sx of aspiration   Esophageal Phase of Swallow   Patient reports or presents with symptoms of esophageal dysphagia No   Swallowing Recommendations   Diet Consistency Recommendations thin liquids (level 0);regular diet   Supervision Level for Intake patient independent   Mode of Delivery Recommendations bolus size, small;food moistened;slow rate of intake   Swallowing Maneuver Recommendations alternate food and liquid intake;extra swallow   Monitoring/Assistance Required (Eating/Swallowing) monitor for cough or change in vocal quality with intake;stop eating activities when fatigue is present   Recommended Feeding/Eating Techniques (Swallow Eval) maintain upright sitting position for eating;maintain upright posture during/after eating for 30 minutes;minimize distractions during oral intake;set-up and prepare tray   Medication Administration Recommendations, Swallowing (SLP) as tolerated   Instrumental Assessment Recommendations instrumental evaluation not recommended at this time   General Therapy Interventions   Planned Therapy Interventions Dysphagia Treatment   Dysphagia treatment Instruction of safe swallow strategies;Compensatory strategies for swallowing   Clinical Impression   Criteria for Skilled Therapeutic Interventions Met (SLP Eval) Current level of  function same as previous level of function   SLP Diagnosis Oropharyngeal swallow WFL   Risks & Benefits of therapy have been explained evaluation/treatment results reviewed;care plan/treatment goals reviewed;risks/benefits reviewed;current/potential barriers reviewed;participants voiced agreement with care plan;participants included;patient   Clinical Impression Comments Clinical swallow eval completed per MD orders. Pt presents with functional oropharyngeal swallowing skills. Oral mech exam unremarkable. Pt tolerated thin liquids, pureed, and regular solid textures with no overt s/sx of aspiration. Functional time for mastication. Recommend regular diet and thin liquids. Pt should be fully upright and alert for all PO, take small sips/bites, slow pacing, and alternate between consistencies. Pt verbalized good understanding. No additional ST needs indicated. Will complete orders.   SLP Discharge Recommendation home with assist   SLP Rationale for DC Rec no additional ST needs indicated   SLP Brief overview of current status  Recommend regular diet and thin liquids. Pt should be fully upright and alert for all PO, take small sips/bites, slow pacing, and alternate between consistencies. No additional ST needs indicated. Will complete orders.   SLP Time and Intention   Total Session Time (sum of timed and untimed services) 14

## 2024-10-31 NOTE — PROGRESS NOTES
Physical Therapy Initial Evaluation   10/31/24 6085   Appointment Info   Signing Clinician's Name / Credentials (PT) Tin Staley, PT   Rehab Comments (PT) crani   Living Environment   People in Home spouse   Current Living Arrangements house   Home Accessibility stairs to enter home   Number of Stairs, Main Entrance 3   Stair Railings, Main Entrance railings on both sides of stairs   Transportation Anticipated family or friend will provide   Self-Care   Usual Activity Tolerance good   Current Activity Tolerance moderate   Regular Exercise Yes   Activity/Exercise Type walking   Exercise Amount/Frequency daily   Equipment Currently Used at Home none   Fall history within last six months no   Activity/Exercise/Self-Care Comment Ind PLOF, active, cuts wood   General Information   Onset of Illness/Injury or Date of Surgery 10/28/24   Referring Physician Desirae Anders, KAYKAY   Patient/Family Therapy Goals Statement (PT) return home   Pertinent History of Current Problem (include personal factors and/or comorbidities that impact the POC) Vinh Mccarty is a 69 year old male, hx of HTN, HLD prior resection of right clinoidal meningioma, who is now:     POD 3 from right retrosigmoid 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 Generalized weakness/deconditioning as demonstrated by slow movments and mobility   Bed Mobility   Comment, (Bed Mobility) Supine>sit CGA x1 increased time/effort   Transfers   Comment, (Transfers) Sit<>stand CGAx1   Gait/Stairs (Locomotion)   Comment, (Gait/Stairs) ambulates 50ft with no AD, slow moving, CGA, occasional losses of balance and path deviations but self corrects. Ascends/descends 2 stairs with  CGA increased time/effort   Balance   Balance Comments EO/EC and head turns statically with CGA and no increase in sway. Did have loss of balance with dynamic balance with head turn but able to self correct.   Clinical Impression   Criteria for Skilled Therapeutic Intervention Yes, treatment indicated   PT Diagnosis (PT) impaired functional mobility   Influenced by the following impairments impaired balance, deconditioned, weakness   Functional limitations due to impairments ambulation, stair negotiation   Clinical Presentation (PT Evaluation Complexity) stable   Clinical Presentation Rationale clinical judgment   Clinical Decision Making (Complexity) low complexity   Planned Therapy Interventions (PT) balance training;bed mobility training;gait training;home exercise program;neuromuscular re-education;stair training;strengthening   Risk & Benefits of therapy have been explained evaluation/treatment results reviewed;care plan/treatment goals reviewed;risks/benefits reviewed;current/potential barriers reviewed;participants voiced agreement with care plan;participants included;patient   PT Total Evaluation Time   PT Eval, Low Complexity Minutes (78474) 6   Physical Therapy Goals   PT Frequency 6x/week  (most likely 1-2 more sessions)   PT Predicted Duration/Target Date for Goal Attainment 11/09/24   PT Goals Bed Mobility;Transfers;Gait;Stairs   PT: Bed Mobility Independent;Supine to/from sit;Within precautions   PT: Transfers Independent;Bed to/from chair;Within precautions   PT: Gait Independent;Greater than 200 feet  (with no LOB)   PT: Stairs Independent;3 stairs   PT Discharge Planning   PT Plan progress gait independence/endurance and dynamic balance, ind in stairs, most likely DC in 1-2 sessions   PT Discharge Recommendation (DC Rec) home with assist   PT Rationale for DC Rec Patient mobilizing fairly well. A little unsteady with dynamic balance but was able to ambulate for the first time today. Anticipate he  will continue to progress well and should be able to DC home once medically stable.   PT Brief overview of current status 1A-encourage ambulation in halls with nursing staff.   Physical Therapy Time and Intention   Total Session Time (sum of timed and untimed services) 6

## 2024-10-31 NOTE — PLAN OF CARE
Goal Outcome Evaluation:       Plan of Care Reviewed With: patient, wife    Overall Patient Progress: improving     Outcome Evaluation: Oriented to unit    Status: POD 3 from right retrosigmoid craniotomy for resection of petrous   ridge meningioma (10/28/2024)   Vitals: VSS except htn bordering parameter.   Neuros: A&Ox4, 5/5 strengths. R vision loss/blurriness at baseline  IV: L PIV infusing at 100ml/h  Resp/trach: RA  Diet: Regular  Bowel status: LBM 10/30  : voiding with urinal frequently.   Skin: R crani side,R abd incision  Pain: well controlled per patient, DPRVS of 2-3. Scheduled tylenol. Caffeine tablets as option for headache.   Activity: SBA  Social: Wife at bedside in morning.   Plan: decadron taper.  Updates:  reported difficulty swallowing. SLP evaluated and cleared.

## 2024-10-31 NOTE — PROGRESS NOTES
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 Fox    -----------------------------------  Silviano Ryder MD  Neurosurgery  Pager: [phone]  Please page/[facility] on-call neurosurgery with questions  -----------------------------------    Objective:   Temp:  [97.7  F (36.5  C)-99  F (37.2  C)] 97.9  F (36.6  C)  Pulse:  [43-90] 65  Resp:  [15-16] 16  BP: (108-139)/(63-94) 134/89  SpO2:  [95 %-99 %] 96 %  I/O last 3 completed shifts:  In: 2363.33 [P.O.:350; I.V.:2013.33]  Out: 1240 [Urine:1240]    Gen: Appears comfortable, NAD  Incision: clean, dry, intact  Neurologic:  Alert & Oriented to person, place, time, and situation  Follows commands briskly  Speech fluent, spontaneous. No aphasia or dysarthria.  No gaze preference. No apparent hemineglect.  Right eye not able to test VA but perceiving shapes and light  Left eye VA 20/80  Face symmetric with sensation intact to light touch  Palate elevates symmetrically, uvula midline, tongue protrudes midline  Trapezii muscles 5/5 bilaterally  No pronator drift     Del Tr Bi WE WF Gr   R 5 5 5 5 5 5   L 5 5 5 5 5 5    HF KE KF DF PF EHL   R  5 5 5 5 5 5   L 5 5 5 5 5 5     Reflexes 2+ throughout  Sensation intact and symmetric to light touch throughout    LABS:  Recent Labs   Lab 10/30/24  0526 10/29/24  0613 10/28/24  2135    141  --    POTASSIUM 3.9 3.7  --    CHLORIDE 114* 111*  --    CO2 21* 19*  --    ANIONGAP 10 11  --    * 138* 148*   BUN 10.5 7.6*  --    CR 0.64* 0.64*  --    BRAD 8.7* 8.4*  --      Recent Labs   Lab 10/30/24  0526   WBC 12.0*   RBC 4.17*   HGB 12.8*   HCT 37.7*   MCV 90   MCH 30.7   MCHC 34.0   RDW 12.8   *       IMAGING:  No results found for this or any previous visit (from the past 24 hours).      I have reviewed the history above and agree with the resident's assessment and plan. Incidental right venous sinus thrombosis. No anticoagulation needed at this point.  SHAHLA Fox MD

## 2024-11-01 ENCOUNTER — APPOINTMENT (OUTPATIENT)
Dept: OCCUPATIONAL THERAPY | Facility: CLINIC | Age: 69
DRG: 025 | End: 2024-11-01
Attending: NEUROLOGICAL SURGERY
Payer: MEDICARE

## 2024-11-01 VITALS
HEIGHT: 71 IN | SYSTOLIC BLOOD PRESSURE: 138 MMHG | WEIGHT: 164.46 LBS | TEMPERATURE: 98.1 F | BODY MASS INDEX: 23.02 KG/M2 | RESPIRATION RATE: 16 BRPM | DIASTOLIC BLOOD PRESSURE: 85 MMHG | HEART RATE: 70 BPM | OXYGEN SATURATION: 97 %

## 2024-11-01 LAB
ANION GAP SERPL CALCULATED.3IONS-SCNC: 10 MMOL/L (ref 7–15)
BUN SERPL-MCNC: 11.1 MG/DL (ref 8–23)
CALCIUM SERPL-MCNC: 8.4 MG/DL (ref 8.8–10.4)
CHLORIDE SERPL-SCNC: 106 MMOL/L (ref 98–107)
CREAT SERPL-MCNC: 0.62 MG/DL (ref 0.67–1.17)
EGFRCR SERPLBLD CKD-EPI 2021: >90 ML/MIN/1.73M2
ERYTHROCYTE [DISTWIDTH] IN BLOOD BY AUTOMATED COUNT: 12.4 % (ref 10–15)
GLUCOSE SERPL-MCNC: 92 MG/DL (ref 70–99)
HCO3 SERPL-SCNC: 20 MMOL/L (ref 22–29)
HCT VFR BLD AUTO: 38.6 % (ref 40–53)
HGB BLD-MCNC: 13.4 G/DL (ref 13.3–17.7)
MAGNESIUM SERPL-MCNC: 2.1 MG/DL (ref 1.7–2.3)
MCH RBC QN AUTO: 30.2 PG (ref 26.5–33)
MCHC RBC AUTO-ENTMCNC: 34.7 G/DL (ref 31.5–36.5)
MCV RBC AUTO: 87 FL (ref 78–100)
PHOSPHATE SERPL-MCNC: 2.8 MG/DL (ref 2.5–4.5)
PLATELET # BLD AUTO: 143 10E3/UL (ref 150–450)
POTASSIUM SERPL-SCNC: 3.4 MMOL/L (ref 3.4–5.3)
RBC # BLD AUTO: 4.43 10E6/UL (ref 4.4–5.9)
SODIUM SERPL-SCNC: 136 MMOL/L (ref 135–145)
WBC # BLD AUTO: 10.3 10E3/UL (ref 4–11)

## 2024-11-01 PROCEDURE — 97535 SELF CARE MNGMENT TRAINING: CPT | Mod: GO

## 2024-11-01 PROCEDURE — 80048 BASIC METABOLIC PNL TOTAL CA: CPT

## 2024-11-01 PROCEDURE — 250N000013 HC RX MED GY IP 250 OP 250 PS 637: Performed by: NURSE PRACTITIONER

## 2024-11-01 PROCEDURE — 84100 ASSAY OF PHOSPHORUS: CPT

## 2024-11-01 PROCEDURE — 250N000013 HC RX MED GY IP 250 OP 250 PS 637

## 2024-11-01 PROCEDURE — 250N000011 HC RX IP 250 OP 636: Performed by: NURSE PRACTITIONER

## 2024-11-01 PROCEDURE — 85048 AUTOMATED LEUKOCYTE COUNT: CPT

## 2024-11-01 PROCEDURE — 250N000013 HC RX MED GY IP 250 OP 250 PS 637: Performed by: NEUROLOGICAL SURGERY

## 2024-11-01 PROCEDURE — 36415 COLL VENOUS BLD VENIPUNCTURE: CPT

## 2024-11-01 PROCEDURE — 250N000012 HC RX MED GY IP 250 OP 636 PS 637: Performed by: NURSE PRACTITIONER

## 2024-11-01 PROCEDURE — 85014 HEMATOCRIT: CPT

## 2024-11-01 PROCEDURE — 83735 ASSAY OF MAGNESIUM: CPT

## 2024-11-01 RX ORDER — OXYCODONE HYDROCHLORIDE 5 MG/1
5 TABLET ORAL EVERY 6 HOURS PRN
Qty: 12 TABLET | Refills: 0 | Status: SHIPPED | OUTPATIENT
Start: 2024-11-01

## 2024-11-01 RX ORDER — POTASSIUM CHLORIDE 750 MG/1
40 TABLET, EXTENDED RELEASE ORAL ONCE
Status: COMPLETED | OUTPATIENT
Start: 2024-11-01 | End: 2024-11-01

## 2024-11-01 RX ADMIN — LABETALOL HYDROCHLORIDE 10 MG: 5 INJECTION, SOLUTION INTRAVENOUS at 03:44

## 2024-11-01 RX ADMIN — ROSUVASTATIN CALCIUM 10 MG: 10 TABLET, FILM COATED ORAL at 08:09

## 2024-11-01 RX ADMIN — ACETAMINOPHEN 650 MG: 325 TABLET, FILM COATED ORAL at 03:31

## 2024-11-01 RX ADMIN — PANTOPRAZOLE SODIUM 40 MG: 40 TABLET, DELAYED RELEASE ORAL at 08:09

## 2024-11-01 RX ADMIN — DEXAMETHASONE 2 MG: 2 TABLET ORAL at 08:10

## 2024-11-01 RX ADMIN — POTASSIUM CHLORIDE 40 MEQ: 750 TABLET, EXTENDED RELEASE ORAL at 09:52

## 2024-11-01 RX ADMIN — PROCHLORPERAZINE MALEATE 5 MG: 5 TABLET ORAL at 10:15

## 2024-11-01 ASSESSMENT — ACTIVITIES OF DAILY LIVING (ADL)
ADLS_ACUITY_SCORE: 0

## 2024-11-01 NOTE — PLAN OF CARE
Status: POD 4, s/p Right retrosigmoid craniotomy for resection of petrous ridge meningioma (10/28/2024)   Vitals: VSS, except intermittent HTN SBP > 140, labetalol given 1x  Neuros: A&O x4. R vision loss/blurriness at baseline. Denies N/T. 5/5 strengths throughout   IV: L PIV SL  Resp/trach: on RA, denies SOB  Diet: Regular  Bowel status: LBM 11/1/2024  : voiding with frequency, intermittent incontinence   Skin: R crani side, R abd incision CDI  Pain: PRN tylenol given for back pain  Activity: SBA, uses IV pole for support  Plan: Plan to discharge home with assist today by 11am, as pt has a four hour drive. Continue with POC

## 2024-11-01 NOTE — PROGRESS NOTES
[id number]    AV. Pt is up with stand by assist. Denied any significant pain. Denied N/V. Appetite and oral intake are fair, pt ate breakfast. Pt voiding adequately but is complaining of some dribbling after voiding, improving per pt. Pt had 2 donte BM's overnight so refused bowel prep as he feels he is going a lot and not straining. Pt got cleaned up and wants to do a full shower when he gets home. Prima pore dressing on right abdomen removed and incision is closed with steri strips. Pt instructed to let strips fall off naturally and pt expressing understanding. R crani incision open to air. Pt's AM K+ was 3.4, and replacement ordered and given orally. No other electrolyte replacements needed. Pt was worried about getting car sick so PRN compazine given x 1. Pt discharged at 1030.

## 2024-11-08 ENCOUNTER — TELEPHONE (OUTPATIENT)
Dept: NEUROSURGERY | Facility: CLINIC | Age: 69
End: 2024-11-08
Payer: COMMERCIAL

## 2024-11-08 NOTE — TELEPHONE ENCOUNTER
Left Voicemail (1st Attempt) for the patient to call back and schedule the following:    Appointment type: Post op  Provider: Lorna Lizama  Return date: Around November 15th  Specialty phone number: [phone]  Additional appointment(s) needed: MRI prior in 3 months  Additonal Notes: patient is currently scheduled with Lorna. Please ressched to a virtual visit. He lives far away and will follow with his PCP for wound check. Also scheduled in 3 months with Dr. Fox with brain MRI prior.

## 2024-11-11 ENCOUNTER — TELEPHONE (OUTPATIENT)
Dept: NEUROSURGERY | Facility: CLINIC | Age: 69
End: 2024-11-11
Payer: COMMERCIAL

## 2024-11-11 NOTE — TELEPHONE ENCOUNTER
Called patient and rescheduled appointment for Lorna Lizama and scheduled appointment w/ Dr. Fox per Laurie Greer via task. Patient will get MRI closer to home. -KB

## 2024-11-19 ENCOUNTER — DOCUMENTATION ONLY (OUTPATIENT)
Dept: NEUROSURGERY | Facility: CLINIC | Age: 69
End: 2024-11-19

## 2024-11-19 ENCOUNTER — VIRTUAL VISIT (OUTPATIENT)
Dept: NEUROSURGERY | Facility: CLINIC | Age: 69
End: 2024-11-19
Payer: COMMERCIAL

## 2024-11-19 DIAGNOSIS — D32.0 CEREBRAL MENINGIOMA (H): Primary | ICD-10-CM

## 2024-11-19 NOTE — PROGRESS NOTES
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 Fox), postoperative visit    History of Present Illness:   Vinh Mccarty is a 69 year old male with a history of hypertension and hyperlipidemia who is seen today by telephone visit for a postoperative appointment. He underwent the above mentioned procedure recently with Dr. Fox after his posterior fossa meningioma was found to be growing at a decent rate on surveillance imaging. 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 Today by phone he reports that his sutures were removed last week by his PCP, Dr. Alvarado. The center of the incision line was a little tender and steri-strips were applied after removal. As these have fallen off, Vinh noted some clear drainage from that area of his incision. This has improved but he's still having a small amount of clear drainage from the incision line. He denies erythema of the skin, increased swelling, or purulent or bloody drainage. He has not had fever. The incision site was slightly swollen, soft to the touch, following surgery and this has gone down. Vinh has a follow up appointment with Dr. Alvarado today and will have him check the wound again. He unfortunately had urinary retention postoperative and had to present to his local ED, with smiley placed and 1300 ml of output. He has Urology follow up for voiding trial.     Surgical pathology confirmed WHO Grade 1 meningioma.   The patient has follow scheduled 2025 with Dr. Fox, will need MRI close to home completed prior.      Review of Systems:   Pertinent items are noted in HPI  or as in patient entered ROS below, remainder of complete ROS is negative.     Physical Exam:   Exam today is limited by telephone visit. Speech is normal. Face appears symmetric.      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 Fox), postoperative visit    Plan:  We reviewed wound care and pathology today. I asked him to have Dr. Alvarado examine his incision site today at his appointment and contact us with concerns; I gave him our RNCC line to call. We reviewed signs/symptoms of infection and that if he has worsening drainage, swelling, new erythema or fever he will need to present to the Emergency Department; discussed risk of infection or hematoma with the recent drainage, reassuring that the fluid has so far been clear but will need close monitoring. We'll arrange MRI in 3 months barring any other complications sooner, and he has an appointment in place with Dr. Fox.        Lorna Lizama PA-C  Department of Neurosurgery

## 2024-11-19 NOTE — NURSING NOTE
Current patient location: [address] [place] [address] [address]  [facility] [address]    Is the patient currently in the state of [place]? YES    Visit mode:TELEPHONE    If the visit is dropped, the patient can be reconnected by:VIDEO VISIT: Text to cell phone:   No relevant phone numbers on file.       Will anyone else be joining the visit? NO  (If patient encounters technical issues they should call [phone])    Are changes needed to the allergy or medication list? No    Are refills needed on medications prescribed by this physician? NO    Rooming Documentation:  Questionnaire(s) not pre-assigned    Reason for visit: Follow Up    Miriam VALENCIA

## 2024-11-19 NOTE — PROGRESS NOTES
Virtual Visit Details    Type of service:  Telephone Visit   Phone call duration: 12 minutes   Originating Location (pt. Location): Home    Distant Location (provider location):  Off-site

## 2024-12-25 ENCOUNTER — APPOINTMENT (OUTPATIENT)
Dept: MRI IMAGING | Facility: CLINIC | Age: 69
DRG: 026 | End: 2024-12-25
Attending: EMERGENCY MEDICINE
Payer: MEDICARE

## 2024-12-25 ENCOUNTER — HOSPITAL ENCOUNTER (INPATIENT)
Facility: CLINIC | Age: 69
End: 2024-12-25
Attending: EMERGENCY MEDICINE | Admitting: NEUROLOGICAL SURGERY
Payer: MEDICARE

## 2024-12-25 DIAGNOSIS — T81.89XA DRAINING POSTOPERATIVE WOUND, INITIAL ENCOUNTER: ICD-10-CM

## 2024-12-25 DIAGNOSIS — R09.81 NASAL CONGESTION: ICD-10-CM

## 2024-12-25 DIAGNOSIS — G96.00 CSF LEAK: Primary | ICD-10-CM

## 2024-12-25 LAB
ABO + RH BLD: NORMAL
ANION GAP SERPL CALCULATED.3IONS-SCNC: 12 MMOL/L (ref 7–15)
ANION GAP SERPL CALCULATED.3IONS-SCNC: 13 MMOL/L (ref 7–15)
APTT PPP: 28 SECONDS (ref 22–38)
BASOPHILS # BLD AUTO: 0 10E3/UL (ref 0–0.2)
BASOPHILS NFR BLD AUTO: 0 %
BLD GP AB SCN SERPL QL: NEGATIVE
BUN SERPL-MCNC: 10.9 MG/DL (ref 8–23)
BUN SERPL-MCNC: 11 MG/DL (ref 8–23)
CALCIUM SERPL-MCNC: 8.7 MG/DL (ref 8.8–10.4)
CALCIUM SERPL-MCNC: 9.2 MG/DL (ref 8.8–10.4)
CHLORIDE SERPL-SCNC: 101 MMOL/L (ref 98–107)
CHLORIDE SERPL-SCNC: 103 MMOL/L (ref 98–107)
CREAT SERPL-MCNC: 0.63 MG/DL (ref 0.67–1.17)
CREAT SERPL-MCNC: 0.78 MG/DL (ref 0.67–1.17)
CRP SERPL-MCNC: 6.58 MG/L
EGFRCR SERPLBLD CKD-EPI 2021: >90 ML/MIN/1.73M2
EGFRCR SERPLBLD CKD-EPI 2021: >90 ML/MIN/1.73M2
EOSINOPHIL # BLD AUTO: 0 10E3/UL (ref 0–0.7)
EOSINOPHIL NFR BLD AUTO: 0 %
ERYTHROCYTE [DISTWIDTH] IN BLOOD BY AUTOMATED COUNT: 13.3 % (ref 10–15)
ERYTHROCYTE [DISTWIDTH] IN BLOOD BY AUTOMATED COUNT: 13.4 % (ref 10–15)
ERYTHROCYTE [SEDIMENTATION RATE] IN BLOOD BY WESTERGREN METHOD: 18 MM/HR (ref 0–20)
GLUCOSE SERPL-MCNC: 110 MG/DL (ref 70–99)
GLUCOSE SERPL-MCNC: 113 MG/DL (ref 70–99)
HCO3 SERPL-SCNC: 20 MMOL/L (ref 22–29)
HCO3 SERPL-SCNC: 22 MMOL/L (ref 22–29)
HCT VFR BLD AUTO: 37.9 % (ref 40–53)
HCT VFR BLD AUTO: 43.6 % (ref 40–53)
HGB BLD-MCNC: 13.3 G/DL (ref 13.3–17.7)
HGB BLD-MCNC: 15 G/DL (ref 13.3–17.7)
HOLD SPECIMEN: NORMAL
IMM GRANULOCYTES # BLD: 0 10E3/UL
IMM GRANULOCYTES NFR BLD: 0 %
INR PPP: 1.12 (ref 0.85–1.15)
LYMPHOCYTES # BLD AUTO: 0.7 10E3/UL (ref 0.8–5.3)
LYMPHOCYTES NFR BLD AUTO: 10 %
MCH RBC QN AUTO: 30.7 PG (ref 26.5–33)
MCH RBC QN AUTO: 30.7 PG (ref 26.5–33)
MCHC RBC AUTO-ENTMCNC: 34.4 G/DL (ref 31.5–36.5)
MCHC RBC AUTO-ENTMCNC: 35.1 G/DL (ref 31.5–36.5)
MCV RBC AUTO: 88 FL (ref 78–100)
MCV RBC AUTO: 89 FL (ref 78–100)
MONOCYTES # BLD AUTO: 0.9 10E3/UL (ref 0–1.3)
MONOCYTES NFR BLD AUTO: 13 %
NEUTROPHILS # BLD AUTO: 5.3 10E3/UL (ref 1.6–8.3)
NEUTROPHILS NFR BLD AUTO: 77 %
NRBC # BLD AUTO: 0 10E3/UL
NRBC BLD AUTO-RTO: 0 /100
PLATELET # BLD AUTO: 158 10E3/UL (ref 150–450)
PLATELET # BLD AUTO: 185 10E3/UL (ref 150–450)
POTASSIUM SERPL-SCNC: 3.7 MMOL/L (ref 3.4–5.3)
POTASSIUM SERPL-SCNC: 3.9 MMOL/L (ref 3.4–5.3)
RBC # BLD AUTO: 4.33 10E6/UL (ref 4.4–5.9)
RBC # BLD AUTO: 4.89 10E6/UL (ref 4.4–5.9)
SODIUM SERPL-SCNC: 135 MMOL/L (ref 135–145)
SODIUM SERPL-SCNC: 136 MMOL/L (ref 135–145)
SPECIMEN EXP DATE BLD: NORMAL
WBC # BLD AUTO: 6.9 10E3/UL (ref 4–11)
WBC # BLD AUTO: 7.7 10E3/UL (ref 4–11)

## 2024-12-25 PROCEDURE — 70553 MRI BRAIN STEM W/O & W/DYE: CPT | Mod: MG

## 2024-12-25 PROCEDURE — 82310 ASSAY OF CALCIUM: CPT

## 2024-12-25 PROCEDURE — G0378 HOSPITAL OBSERVATION PER HR: HCPCS

## 2024-12-25 PROCEDURE — A9585 GADOBUTROL INJECTION: HCPCS

## 2024-12-25 PROCEDURE — 80048 BASIC METABOLIC PNL TOTAL CA: CPT | Performed by: EMERGENCY MEDICINE

## 2024-12-25 PROCEDURE — 86140 C-REACTIVE PROTEIN: CPT | Performed by: EMERGENCY MEDICINE

## 2024-12-25 PROCEDURE — 70553 MRI BRAIN STEM W/O & W/DYE: CPT | Mod: 26 | Performed by: RADIOLOGY

## 2024-12-25 PROCEDURE — 258N000003 HC RX IP 258 OP 636

## 2024-12-25 PROCEDURE — 99285 EMERGENCY DEPT VISIT HI MDM: CPT | Performed by: EMERGENCY MEDICINE

## 2024-12-25 PROCEDURE — 120N000002 HC R&B MED SURG/OB UMMC

## 2024-12-25 PROCEDURE — 250N000013 HC RX MED GY IP 250 OP 250 PS 637: Performed by: NEUROLOGICAL SURGERY

## 2024-12-25 PROCEDURE — 85027 COMPLETE CBC AUTOMATED: CPT

## 2024-12-25 PROCEDURE — 36415 COLL VENOUS BLD VENIPUNCTURE: CPT | Performed by: EMERGENCY MEDICINE

## 2024-12-25 PROCEDURE — 86900 BLOOD TYPING SEROLOGIC ABO: CPT

## 2024-12-25 PROCEDURE — G1010 CDSM STANSON: HCPCS

## 2024-12-25 PROCEDURE — 85025 COMPLETE CBC W/AUTO DIFF WBC: CPT | Performed by: EMERGENCY MEDICINE

## 2024-12-25 PROCEDURE — 86850 RBC ANTIBODY SCREEN: CPT

## 2024-12-25 PROCEDURE — G1010 CDSM STANSON: HCPCS | Performed by: RADIOLOGY

## 2024-12-25 PROCEDURE — 250N000013 HC RX MED GY IP 250 OP 250 PS 637

## 2024-12-25 PROCEDURE — 85730 THROMBOPLASTIN TIME PARTIAL: CPT

## 2024-12-25 PROCEDURE — 80048 BASIC METABOLIC PNL TOTAL CA: CPT

## 2024-12-25 PROCEDURE — 85652 RBC SED RATE AUTOMATED: CPT | Performed by: EMERGENCY MEDICINE

## 2024-12-25 PROCEDURE — 36415 COLL VENOUS BLD VENIPUNCTURE: CPT

## 2024-12-25 PROCEDURE — 255N000002 HC RX 255 OP 636

## 2024-12-25 PROCEDURE — 85610 PROTHROMBIN TIME: CPT

## 2024-12-25 RX ORDER — NALOXONE HYDROCHLORIDE 0.4 MG/ML
0.2 INJECTION, SOLUTION INTRAMUSCULAR; INTRAVENOUS; SUBCUTANEOUS
Status: ACTIVE | OUTPATIENT
Start: 2024-12-25

## 2024-12-25 RX ORDER — HYDROMORPHONE HCL IN WATER/PF 6 MG/30 ML
.2-.4 PATIENT CONTROLLED ANALGESIA SYRINGE INTRAVENOUS
Status: DISCONTINUED | OUTPATIENT
Start: 2024-12-25 | End: 2024-12-26

## 2024-12-25 RX ORDER — NALOXONE HYDROCHLORIDE 0.4 MG/ML
0.4 INJECTION, SOLUTION INTRAMUSCULAR; INTRAVENOUS; SUBCUTANEOUS
Status: ACTIVE | OUTPATIENT
Start: 2024-12-25

## 2024-12-25 RX ORDER — AMLODIPINE BESYLATE 5 MG/1
5 TABLET ORAL DAILY
Status: DISPENSED | OUTPATIENT
Start: 2024-12-25

## 2024-12-25 RX ORDER — OSELTAMIVIR PHOSPHATE 75 MG/1
75 CAPSULE ORAL ONCE
Status: COMPLETED | OUTPATIENT
Start: 2024-12-25 | End: 2024-12-25

## 2024-12-25 RX ORDER — GADOBUTROL 604.72 MG/ML
7.5 INJECTION INTRAVENOUS ONCE
Status: COMPLETED | OUTPATIENT
Start: 2024-12-25 | End: 2024-12-25

## 2024-12-25 RX ORDER — POLYETHYLENE GLYCOL 3350 17 G/17G
17 POWDER, FOR SOLUTION ORAL DAILY
Status: DISCONTINUED | OUTPATIENT
Start: 2024-12-25 | End: 2024-12-26

## 2024-12-25 RX ORDER — SODIUM CHLORIDE 9 MG/ML
INJECTION, SOLUTION INTRAVENOUS CONTINUOUS
Status: ACTIVE | OUTPATIENT
Start: 2024-12-26

## 2024-12-25 RX ORDER — ACETAMINOPHEN 325 MG/1
650 TABLET ORAL EVERY 4 HOURS PRN
Status: DISCONTINUED | OUTPATIENT
Start: 2024-12-25 | End: 2024-12-25

## 2024-12-25 RX ORDER — ACETAMINOPHEN 325 MG/1
650 TABLET ORAL EVERY 4 HOURS
Status: DISCONTINUED | OUTPATIENT
Start: 2024-12-25 | End: 2024-12-26

## 2024-12-25 RX ORDER — SENNOSIDES 8.6 MG
8.6 TABLET ORAL 2 TIMES DAILY
Status: DISCONTINUED | OUTPATIENT
Start: 2024-12-25 | End: 2024-12-26

## 2024-12-25 RX ORDER — OXYCODONE HYDROCHLORIDE 5 MG/1
5 TABLET ORAL
Status: DISCONTINUED | OUTPATIENT
Start: 2024-12-25 | End: 2024-12-26

## 2024-12-25 RX ORDER — ROSUVASTATIN CALCIUM 10 MG/1
10 TABLET, COATED ORAL DAILY
Status: DISPENSED | OUTPATIENT
Start: 2024-12-25

## 2024-12-25 RX ORDER — PANTOPRAZOLE SODIUM 40 MG/1
40 TABLET, DELAYED RELEASE ORAL
Status: DISPENSED | OUTPATIENT
Start: 2024-12-25

## 2024-12-25 RX ORDER — LIDOCAINE 40 MG/G
CREAM TOPICAL
Status: ACTIVE | OUTPATIENT
Start: 2024-12-25

## 2024-12-25 RX ORDER — ONDANSETRON 4 MG/1
4 TABLET, ORALLY DISINTEGRATING ORAL EVERY 6 HOURS PRN
Status: DISCONTINUED | OUTPATIENT
Start: 2024-12-25 | End: 2024-12-26

## 2024-12-25 RX ORDER — LISINOPRIL 40 MG/1
40 TABLET ORAL DAILY
Status: DISPENSED | OUTPATIENT
Start: 2024-12-25

## 2024-12-25 RX ORDER — RAMIPRIL 10 MG/1
10 CAPSULE ORAL DAILY
Status: DISCONTINUED | OUTPATIENT
Start: 2024-12-25 | End: 2024-12-25

## 2024-12-25 RX ADMIN — SENNOSIDES 8.6 MG: 8.6 TABLET, FILM COATED ORAL at 12:17

## 2024-12-25 RX ADMIN — AMLODIPINE BESYLATE 5 MG: 5 TABLET ORAL at 09:59

## 2024-12-25 RX ADMIN — GADOBUTROL 7.5 ML: 604.72 INJECTION INTRAVENOUS at 05:32

## 2024-12-25 RX ADMIN — PANTOPRAZOLE SODIUM 40 MG: 40 TABLET, DELAYED RELEASE ORAL at 09:59

## 2024-12-25 RX ADMIN — OSELTAMIVIR PHOSPHATE 75 MG: 75 CAPSULE ORAL at 12:16

## 2024-12-25 RX ADMIN — ACETAMINOPHEN 650 MG: 325 TABLET, FILM COATED ORAL at 21:51

## 2024-12-25 RX ADMIN — SENNOSIDES 8.6 MG: 8.6 TABLET, FILM COATED ORAL at 21:21

## 2024-12-25 RX ADMIN — ACETAMINOPHEN 650 MG: 325 TABLET, FILM COATED ORAL at 12:22

## 2024-12-25 RX ADMIN — SODIUM CHLORIDE 500 ML: 9 INJECTION, SOLUTION INTRAVENOUS at 23:04

## 2024-12-25 RX ADMIN — ROSUVASTATIN CALCIUM 10 MG: 10 TABLET, FILM COATED ORAL at 09:59

## 2024-12-25 RX ADMIN — ACETAMINOPHEN 650 MG: 325 TABLET, FILM COATED ORAL at 06:16

## 2024-12-25 RX ADMIN — PANTOPRAZOLE SODIUM 40 MG: 40 TABLET, DELAYED RELEASE ORAL at 15:54

## 2024-12-25 RX ADMIN — LISINOPRIL 40 MG: 40 TABLET ORAL at 09:59

## 2024-12-25 ASSESSMENT — ACTIVITIES OF DAILY LIVING (ADL)
ADLS_ACUITY_SCORE: 60
ADLS_ACUITY_SCORE: 57
ADLS_ACUITY_SCORE: 60
ADLS_ACUITY_SCORE: 40
ADLS_ACUITY_SCORE: 57
ADLS_ACUITY_SCORE: 60
ADLS_ACUITY_SCORE: 60
ADLS_ACUITY_SCORE: 57
ADLS_ACUITY_SCORE: 60
ADLS_ACUITY_SCORE: 60
ADLS_ACUITY_SCORE: 40
ADLS_ACUITY_SCORE: 57
ADLS_ACUITY_SCORE: 60

## 2024-12-25 ASSESSMENT — COLUMBIA-SUICIDE SEVERITY RATING SCALE - C-SSRS
1. IN THE PAST MONTH, HAVE YOU WISHED YOU WERE DEAD OR WISHED YOU COULD GO TO SLEEP AND NOT WAKE UP?: NO
2. HAVE YOU ACTUALLY HAD ANY THOUGHTS OF KILLING YOURSELF IN THE PAST MONTH?: NO
6. HAVE YOU EVER DONE ANYTHING, STARTED TO DO ANYTHING, OR PREPARED TO DO ANYTHING TO END YOUR LIFE?: NO

## 2024-12-25 NOTE — PROGRESS NOTES
Observation goals:   -Wound checks to determine leakage: no drainage at this time  -Imaging studies: not met, in process  -Intervention plan if needed: not met

## 2024-12-25 NOTE — PLAN OF CARE
Arrived from: ED  Belongings/meds: remains with patient, no home meds  2 RN Skin Assessment Completed by:   Skin Findings: maciej duff RN and bhupinder duff RN   Non-intact findings documented (yes/no/NA): previous incision behind R ear, bruising to shins

## 2024-12-25 NOTE — ED TRIAGE NOTES
Patient ambulatory to triage with c/o post op wound leak. Patient had lesion removal on the back of his right ear last 10/28/24 and staples was removed 11/09/2024. Steri strips was placed and when it started to fell off, the wound started to leak. Pt consulted his PCP and was given antibiotics for this. Patient reported that leaking has been on and off until 2 days ago when he noticed that it was dripping and noticed an excessive amount of clear drainage making his shirt and pillows saturated. Patient went to [facility] ED and referred here to for neurosurgery evaluation. Patient also had fever earlier and tested positive for Influenza. Afebrile at triage.     Triage Assessment (Adult)       Row Name 12/25/24 0212          Triage Assessment    Airway WDL WDL        Respiratory WDL    Respiratory WDL WDL        Skin Circulation/Temperature WDL    Skin Circulation/Temperature WDL WDL        Cardiac WDL    Cardiac WDL WDL        Peripheral/Neurovascular WDL    Peripheral Neurovascular WDL WDL        Cognitive/Neuro/Behavioral WDL    Cognitive/Neuro/Behavioral WDL WDL        Ismael Coma Scale    Best Eye Response 4-->(E4) spontaneous     Best Motor Response 6-->(M6) obeys commands     Best Verbal Response 5-->(V5) oriented     [place] Coma Scale Score 15

## 2024-12-25 NOTE — H&P
"[facility]     NEUROSURGERY HISTORY AND PHYSICAL    HPI:  Vinh Mccarty is a 69 year old male with a notable history of a right-sided clinoidal meningioma that was resected in 2019 after he was found to have vision loss. After surgery, his vision remained poor. 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 Given the interval growht, surgical intervention was performed on 10/28/2024 (with Dr. Powell from Neuro-Otology, and Dr. Fox, Neurosurgery). His post-operative course was complicated by asymptomatic bradycardia and an asympomtatic sinus thrombosis managed with IV fluids. On 11/19, he had a telephone visit with neurosurgery to assess post-operative recovery. At this time, he complained of some clear drainage from the surgical incision, which was slowly improving but persistent. He additionally complained of ongoing urinary retention requiring a smiley catheter. At this time he was asked to present to the ED if he developed worsening drianage, swelling, eryhthema or fevers. Today, he reported the development of a flu with ongoing drainage from his incision. He initially presented to a local ED, however, was asked to [facility] to the [facility]. On interview, he endorses a mild headache, but denies any focal weakness, numbness. He currently has a dry incision site. He reports his flu-like symptoms started a few days ago and have included upper respiratory congestion and headaches. He denies any neck rigidity or tenderness. He denies any erythema surrounding the incision, or any focal tenderness. He endorses ongoing clear drainage from the site.     ROS: 10 point ROS in other systems negative other than noted in HPI.    Physical exam:   Blood pressure 121/74, pulse 84, temperature 99.8  F (37.7  C), temperature source Oral, resp. rate 18, height 1.803 m (5' 11\"), weight 73.9 kg (163 lb), SpO2 97%.  General: Well " appearing, NAD  HEENT: atraumatic, normocephalic. Incision with central area of mild opening. Currently dry and intact.   PULM: Unlabored respirations  MSK: Appropriate range of motion  NEUROLOGIC:  -- Awake; Alert; oriented x 3  -- Follows commands briskly  -- Speech fluent, spontaneous. No aphasia or dysarthria.  -- no gaze preference. No apparent hemineglect.  Cranial Nerves:  -- visual fields full to confrontation, PERRL 3-2mm bilat and brisk, extraocular movements intact  -- Left eye is able to perceive light and shapes, left eye VA is 20/100 on bedside exam  -- face symmetrical, tongue midline  -- sensory V1-V3 intact bilaterally  -- palate elevates symmetrically, uvula midline  -- hearing grossly intact bilat  -- Trapezii 5/5 strength bilat symmetric  -- Cerebellar: Finger nose finger without dysmetria, intact rapid alternating motions bilaterally    Motor:  Normal bulk and tone; no tremor, rigidity, or bradykinesia.  No Pronator Drift   Delt Bi Tri Hand Flexion/  Extension Iliopsoas Quadriceps Hamstrings Tibialis Anterior Gastroc    C5 C6 C7 C8/T1 L2 L3 L4-S1 L4 S1   R 5 5 5 5 5 5 5 5 5   L 5 5 5 5 5 5 5 5 5     Sensory:  intact to LT x 4 extremities to light touch  Reflexes: no clonus, reflexes 2+/4 in bicep, patella and ankle.  Gait: deferred     Incision:    No evidence of weeping from incision, currently fully dry  Small area of white debris within incision site that appears to have an area that is opening up.    Pertinent Imaging and Labs:  Imaging Studies:  CT head without contrast, [facility]  IMPRESSION:   1. No acute intracranial abnormality. Sulci and gyri are normal in configuration ventricle size is normal.   2. Postop changes in the right occipital mastoid region.     Labs:  CRP 6.58  ESR 18  WBC 6.9, Hgb 15, Plt 185  , K 3.7, Crt 0.78    ASSESSMENT:  69 year old male, hx of petroclival meningioma resection with retrosigmoid craniotomy, with hx of clear leaking from his incision for  the past 2 months intermittently, currently incision site is dry  Presenting with fever, chills, and upper respiratory symptoms, positive for Influenza A  No concern for nuchal rigidity, Kerdnig/Brudzinsky test negative    PLAN:  No emergent neurosurgical intervention indicated at this time, will discuss timing of CSF leak repair with staff with possible lumbar drain placement  NPO until confirmed surgical plan  MRI brain with and without contrast  Coags in AM  No need for antibiotics for now given clinically signs of influenza     The patient's presentation, exam, and imaging and the listed recommendations/plan were discussed with Dr. Powell, neurosurgery chief resident, and Dr. Thomas, neurosurgery staff.    Silviano Ryder MD  Neurosurgery  Pager: [phone]

## 2024-12-25 NOTE — ED NOTES
Pt brought to ED by family for drainage from post op wound site. Pt stated drainage has soaked hand towel on way to ED. A&Ox4. VSS on RA, afebrile. Headache pain 3/10, applied warm pack that helped somewhat. Pt tried to eat chicken noodle soup at home but felt nauseous and gagged, Pt states still feeling slightly nauseous, but not as bad as when trying to eat. Tested positive for influenza A. Spouse at bedside.

## 2024-12-25 NOTE — ED PROVIDER NOTES
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  Reports the development of some incisional drainage around November 9 when he had the staples removed.  His primary care provider started him on Keflex for this which caused some improvement.  However the leaking has been on and off and the course of Keflex was extended.  He is currently not on antibiotics.  The drainage has been increased significantly over the past 2 to 3 days.  He states that he has been soaking his shirt and the pillows with fluid from the right side of his scalp.  Patient presented to an outside emergency department yesterday where he was found to be positive for influenza A.  He reports having a subjective fever starting yesterday.  He was referred to this emergency department to see neurosurgery today.  Denies headache or neck stiffness.  Has had a runny nose and congestion.    Past Medical History  Past Medical History:   Diagnosis Date    HTN (hypertension) 10/10/2011    hyperlipidemia 10/10/2011     Past Surgical History:   Procedure Laterality Date    ARTHROSCOPY KNEE  2011    LT; torn menicus knee    bakers cyst / removed  2011    LT    COLONOSCOPY  11/2011    CRANIOTOMY, EXCISE TUMOR COMPLEX, COMBINED Right 7/25/2019    Procedure: Right Frontotemporal Craniotomy And  "Resection Of Tumor;  Surgeon: Matthew Fox MD;  Location: [facility] OR    CRANIOTOMY, EXCISE TUMOR COMPLEX, COMBINED Right 10/28/2024    Procedure: Right retrosigmoid approach for resection of meningioma;  Surgeon: Matthew Fox MD;  Location: [facility] OR    ELBOW SURGERY      Removal of pin    GLAUCOMA SURGERY      PROCURE GRAFT FAT Right 10/28/2024    Procedure: SURGICAL PROCUREMENT, Abdominal FAT GRAFT;  Surgeon: Matthew Fox MD;  Location: [facility] OR    surgically repaired/pins  1979    rt elbow fx     No current outpatient medications on file.    Allergies   Allergen Reactions    Cats      congestion    Horse Protein      Per allergy testing    Penicillins     Prabhakar Grasses      Family History  Family History   Problem Relation Age of Onset    Heart Disease Father         CHF    Diabetes Father     Other - See Comments Father 62        emphysema    Respiratory Father     Cerebrovascular Disease Mother 94    Other - See Comments Brother         emphysema    Asthma No family hx of     Anesthesia Reaction No family hx of     Deep Vein Thrombosis (DVT) No family hx of      Social History   Social History     Tobacco Use    Smoking status: Never    Smokeless tobacco: Never   Substance Use Topics    Alcohol use: Yes     Comment: socially    Drug use: Yes     Types: Marijuana     Comment: occasionally      A medically appropriate review of systems was performed with pertinent positives and negatives noted in the HPI, and all other systems negative.    Physical Exam   BP: 136/88  Pulse: 84  Temp: 98  F (36.7  C)  Resp: 18  Height: 180.3 cm (5' 11\")  Weight: 73.9 kg (163 lb)  SpO2: 96 %  Physical Exam  Vitals and nursing note reviewed.   Constitutional:       General: He is not in acute distress.     Appearance: Normal appearance. He is well-developed. He is not ill-appearing or diaphoretic.   HENT:      Head: Normocephalic. Mass present. No raccoon eyes, abrasion, contusion, right " periorbital erythema, left periorbital erythema or laceration.      Jaw: No trismus.        Comments: Well-healed incision behind the right ear without active drainage.  Surrounding skin is completely dry with no visible wound dehiscence.  There is a contour deformity over the mastoid in the area of the incision which is nontender, nonfluctuant, without erythema or warmth.     Nose: Nose normal.      Mouth/Throat:      Mouth: Mucous membranes are moist.   Eyes:      General: No scleral icterus.     Conjunctiva/sclera: Conjunctivae normal.   Cardiovascular:      Rate and Rhythm: Normal rate.   Pulmonary:      Effort: Pulmonary effort is normal. No respiratory distress.      Breath sounds: No stridor.   Abdominal:      General: There is no distension.   Musculoskeletal:         General: No deformity or signs of injury. Normal range of motion.      Cervical back: Normal range of motion and neck supple. No rigidity.   Skin:     General: Skin is warm and dry.      Coloration: Skin is not jaundiced or pale.      Findings: No erythema or rash.   Neurological:      General: No focal deficit present.      Mental Status: He is alert and oriented to person, place, and time.   Psychiatric:         Mood and Affect: Mood normal.         Behavior: Behavior normal.         Thought Content: Thought content normal.           ED Course, Procedures, & Data      Procedures                Results for orders placed or performed during the hospital encounter of 12/25/24   MR Brain w/o & w Contrast     Status: None    Narrative    EXAM: MR BRAIN W/O and W CONTRAST  LOCATION: [facility]  DATE: 12/25/2024    INDICATION: post operative drainage  COMPARISON: 10/29/2024.  CONTRAST: 7.5 mL Gadavist  TECHNIQUE: MR brain without and with contrast.    FINDINGS:  On the diffusion weighted images there is no evidence of acute ischemia or restrictive diffusion in the brain parenchyma or the soft tissues.   There is no evidence of acute extraaxial fluid collection or intraparenchymal hemorrhage. There are   appropriate flow voids in the cavernous portions of the internal carotid arteries and basilar artery.The patient is status post right lateral suboccipital craniectomy for resection of petrous ridge meningioma. There is a stable postoperative fluid   collection at the craniectomy site that measures approximately 2.2 cm anteriorly posteriorly by 1.6 cm transversely by 3.3 cm craniocaudally. This does have postoperative dural enhancement. There is mild edema in the adjacent brain parenchyma. There is   mild scattered postoperative dural enhancement visualized. There is no evidence of a residual nodular mass to suggest residual meningioma. There is probable continued dural venous sinus thrombosis of the junction of the distal right transverse sinus and   sigmoid sinus but there is no longer a clot extending into the proximal right internal jugular vein.    There are a few scattered foci of high signal on the FLAIR and T2-weighted images within the periventricular and subcortical white matter consistent with minimal small vessel ischemic disease. There is new focal early encephalomalacia anterior right   cerebellar hemisphere as seen on axial T2 and FLAIR image #6. The ventricular system, basal cisterns and the cortical sulci are within normal limits for age.    There is no evidence of cerebellar tonsillar ectopia. The corpus callosum and the sella region have appropriate configuration and signal intensity for the patient's age. The orbit regions are unremarkable. There is no significant paranasal sinus disease.   There is fluid noted within the right mastoid air cells.      Impression    IMPRESSION:   1.  Status post right lateral suboccipital craniectomy for resection of petrous ridge meningioma.  2. Well-defined surgical resection cavity at the craniectomy site and mild edema in adjacent soft tissue.  3. Mild  postoperative dural enhancement with no evidence of residual meningioma.  4. Probable early encephalomalacia anterior right cerebellar hemisphere.  5.  Fluid within right mastoid air cells.   Dema Draw     Status: None    Narrative    The following orders were created for panel order Dema Draw.  Procedure                               Abnormality         Status                     ---------                               -----------         ------                     Extra Blue Top Tube[[id number]]                              Final result               Extra Red Top Tube[[id number]]                               Final result               Extra Green Top (Lithium...[[id number]]                      Final result               Extra Purple Top Tube[[id number]]                            Final result                 Please view results for these tests on the individual orders.   Extra Blue Top Tube     Status: None   Result Value Ref Range    Hold Specimen JIC    Extra Red Top Tube     Status: None   Result Value Ref Range    Hold Specimen JIC    Extra Green Top (Lithium Heparin) Tube     Status: None   Result Value Ref Range    Hold Specimen JIC    Extra Purple Top Tube     Status: None   Result Value Ref Range    Hold Specimen JIC    Basic metabolic panel     Status: Abnormal   Result Value Ref Range    Sodium 136 135 - 145 mmol/L    Potassium 3.7 3.4 - 5.3 mmol/L    Chloride 101 98 - 107 mmol/L    Carbon Dioxide (CO2) 22 22 - 29 mmol/L    Anion Gap 13 7 - 15 mmol/L    Urea Nitrogen 11.0 8.0 - 23.0 mg/dL    Creatinine 0.78 0.67 - 1.17 mg/dL    GFR Estimate >90 >60 mL/min/1.73m2    Calcium 9.2 8.8 - 10.4 mg/dL    Glucose 113 (H) 70 - 99 mg/dL   CRP inflammation     Status: Abnormal   Result Value Ref Range    CRP Inflammation 6.58 (H) <5.00 mg/L   Erythrocyte sedimentation rate auto     Status: Normal   Result Value Ref Range    Erythrocyte Sedimentation Rate 18 0 - 20 mm/hr   CBC with platelets and differential      Status: Abnormal   Result Value Ref Range    WBC Count 6.9 4.0 - 11.0 10e3/uL    RBC Count 4.89 4.40 - 5.90 10e6/uL    Hemoglobin 15.0 13.3 - 17.7 g/dL    Hematocrit 43.6 40.0 - 53.0 %    MCV 89 78 - 100 fL    MCH 30.7 26.5 - 33.0 pg    MCHC 34.4 31.5 - 36.5 g/dL    RDW 13.4 10.0 - 15.0 %    Platelet Count 185 150 - 450 10e3/uL    % Neutrophils 77 %    % Lymphocytes 10 %    % Monocytes 13 %    % Eosinophils 0 %    % Basophils 0 %    % Immature Granulocytes 0 %    NRBCs per 100 WBC 0 <1 /100    Absolute Neutrophils 5.3 1.6 - 8.3 10e3/uL    Absolute Lymphocytes 0.7 (L) 0.8 - 5.3 10e3/uL    Absolute Monocytes 0.9 0.0 - 1.3 10e3/uL    Absolute Eosinophils 0.0 0.0 - 0.7 10e3/uL    Absolute Basophils 0.0 0.0 - 0.2 10e3/uL    Absolute Immature Granulocytes 0.0 <=0.4 10e3/uL    Absolute NRBCs 0.0 10e3/uL   INR     Status: Normal   Result Value Ref Range    INR 1.12 0.85 - 1.15   Partial thromboplastin time     Status: Normal   Result Value Ref Range    aPTT 28 22 - 38 Seconds   CBC with platelets     Status: Abnormal   Result Value Ref Range    WBC Count 7.7 4.0 - 11.0 10e3/uL    RBC Count 4.33 (L) 4.40 - 5.90 10e6/uL    Hemoglobin 13.3 13.3 - 17.7 g/dL    Hematocrit 37.9 (L) 40.0 - 53.0 %    MCV 88 78 - 100 fL    MCH 30.7 26.5 - 33.0 pg    MCHC 35.1 31.5 - 36.5 g/dL    RDW 13.3 10.0 - 15.0 %    Platelet Count 158 150 - 450 10e3/uL   Basic metabolic panel     Status: Abnormal   Result Value Ref Range    Sodium 135 135 - 145 mmol/L    Potassium 3.9 3.4 - 5.3 mmol/L    Chloride 103 98 - 107 mmol/L    Carbon Dioxide (CO2) 20 (L) 22 - 29 mmol/L    Anion Gap 12 7 - 15 mmol/L    Urea Nitrogen 10.9 8.0 - 23.0 mg/dL    Creatinine 0.63 (L) 0.67 - 1.17 mg/dL    GFR Estimate >90 >60 mL/min/1.73m2    Calcium 8.7 (L) 8.8 - 10.4 mg/dL    Glucose 110 (H) 70 - 99 mg/dL   Adult Type and Screen     Status: None   Result Value Ref Range    ABO/RH(D) A POS     Antibody Screen Negative Negative    SPECIMEN EXPIRATION DATE [id number]    CBC  with platelets differential     Status: Abnormal    Narrative    The following orders were created for panel order CBC with platelets differential.  Procedure                               Abnormality         Status                     ---------                               -----------         ------                     CBC with platelets and d...[[id number]]  Abnormal            Final result                 Please view results for these tests on the individual orders.   ABO/Rh type and screen     Status: None    Narrative    The following orders were created for panel order ABO/Rh type and screen.  Procedure                               Abnormality         Status                     ---------                               -----------         ------                     Adult Type and Screen[[id number]]                            Final result                 Please view results for these tests on the individual orders.     Medications   oseltamivir (TAMIFLU) capsule 75 mg (has no administration in time range)   amLODIPine (NORVASC) tablet 5 mg (has no administration in time range)   rosuvastatin (CRESTOR) tablet 10 mg (has no administration in time range)   polyethylene glycol-propylene glycol (SYSTANE ULTRA) ophthalmic solution 1 drop (has no administration in time range)   lidocaine 1 % 1 mL (has no administration in time range)   lidocaine (LMX4) cream (has no administration in time range)   sodium chloride (PF) 0.9% PF flush 3 mL (has no administration in time range)   sodium chloride (PF) 0.9% PF flush 3 mL (3 mLs Intravenous $Given 12/25/24 0621)   sodium chloride 0.9 % infusion (has no administration in time range)   polyethylene glycol (MIRALAX) Packet 17 g (has no administration in time range)   sennosides (SENOKOT) tablet 8.6 mg (has no administration in time range)   prochlorperazine (COMPAZINE) injection 5 mg (has no administration in time range)   HYDROmorphone (DILAUDID) injection 0.2-0.4 mg (has no  administration in time range)   oxyCODONE (ROXICODONE) tablet 5 mg (has no administration in time range)   acetaminophen (TYLENOL) tablet 650 mg (650 mg Oral $Given 12/25/24 0616)   ondansetron (ZOFRAN ODT) ODT tab 4 mg (has no administration in time range)   pantoprazole (PROTONIX) EC tablet 40 mg (has no administration in time range)   lisinopril (ZESTRIL) tablet 40 mg (has no administration in time range)   gadobutrol (GADAVIST) injection 7.5 mL (7.5 mLs Intravenous $Given 12/25/24 0532)     Labs Ordered and Resulted from Time of ED Arrival to Time of ED Departure   BASIC METABOLIC PANEL - Abnormal       Result Value    Sodium 136      Potassium 3.7      Chloride 101      Carbon Dioxide (CO2) 22      Anion Gap 13      Urea Nitrogen 11.0      Creatinine 0.78      GFR Estimate >90      Calcium 9.2      Glucose 113 (*)    CRP INFLAMMATION - Abnormal    CRP Inflammation 6.58 (*)    CBC WITH PLATELETS AND DIFFERENTIAL - Abnormal    WBC Count 6.9      RBC Count 4.89      Hemoglobin 15.0      Hematocrit 43.6      MCV 89      MCH 30.7      MCHC 34.4      RDW 13.4      Platelet Count 185      % Neutrophils 77      % Lymphocytes 10      % Monocytes 13      % Eosinophils 0      % Basophils 0      % Immature Granulocytes 0      NRBCs per 100 WBC 0      Absolute Neutrophils 5.3      Absolute Lymphocytes 0.7 (*)     Absolute Monocytes 0.9      Absolute Eosinophils 0.0      Absolute Basophils 0.0      Absolute Immature Granulocytes 0.0      Absolute NRBCs 0.0     ERYTHROCYTE SEDIMENTATION RATE AUTO - Normal    Erythrocyte Sedimentation Rate 18     TYPE AND SCREEN, ADULT    ABO/RH(D) A POS      Antibody Screen Negative      SPECIMEN EXPIRATION DATE [id number]                  Assessment & Plan    Vinh Mccarty is a 69 year old male past medical history of 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     Ddx: Postop wound infection, seroma, influenza, mastoiditis    Neurosurgery was aware of patient's referral.  They recommended obtaining an MRI of the brain with and without contrast and they will see him in the emergency department.  Not think a CT of the temporal bone with contrast was indicated.  Patient given Tamiflu for his influenza A.  Normal on arrival.  No focal neurologic deficits.  The wound is very well-appearing without active drainage on my examination.  No signs of surrounding cellulitis or abscess on the scalp.     Neurosurgery called back and requested patient be admitted to their service on observation status.    I have reviewed the nursing notes. I have reviewed the findings, diagnosis, plan and need for follow up with the patient.    Current Discharge Medication List          Final diagnoses:   Draining postoperative wound, initial encounter       Ebonie Steele  [facility] EMERGENCY DEPARTMENT  12/25/2024     Ebonie Steele MD  12/25/24 0734

## 2024-12-25 NOTE — PLAN OF CARE
Goal Outcome Evaluation:      Plan of Care Reviewed With: patient    Overall Patient Progress: no changeOverall Patient Progress: no change    Outcome Evaluation: no drainage noted this shift. fever of 100.8, PRN tylenol given. NPO for possible procedure.    Status: Admitted d/t clear drainage from previous R petrous ride meningioma resection site. Found to be positive for Influenza A, placed on droplet precautions for 7 days.  Hx of HTN, HLD, meningioma r/x in 2019 and 10/28/2024.  Vitals: VSS on RA ex temp over parameter @ 100.8 oral, MD notified, PRN tylenol given. On continuous pulse ox d/t influenza status.  Neuros: A&Ox4. R peripheral vision in R eye cloudly. [employer] [place] bilaterally.  IV: R PIV SL  Labs/Electrolytes: draws this AM. MRI completed prior to coming to unit.  Resp/trach: on RA. Denies SOB.  Diet: NPO since 0430 for possible OR.   Bowel status: LBM PTA.  : self caths, supplies put in room.  Skin: previous incision behind R ear, bruising to shins  Pain: denies  Activity: up ad pratik  Social: patient wife in room with pt, supportive.  Plan: given obs handout, continue obs goals. Monitor for drainage from previous incision.

## 2024-12-26 ENCOUNTER — ANESTHESIA EVENT (OUTPATIENT)
Dept: SURGERY | Facility: CLINIC | Age: 69
End: 2024-12-26
Payer: MEDICARE

## 2024-12-26 ENCOUNTER — APPOINTMENT (OUTPATIENT)
Dept: CT IMAGING | Facility: CLINIC | Age: 69
End: 2024-12-26
Payer: MEDICARE

## 2024-12-26 ENCOUNTER — ANESTHESIA (OUTPATIENT)
Dept: SURGERY | Facility: CLINIC | Age: 69
End: 2024-12-26
Payer: MEDICARE

## 2024-12-26 VITALS
HEART RATE: 73 BPM | TEMPERATURE: 97.9 F | DIASTOLIC BLOOD PRESSURE: 71 MMHG | WEIGHT: 163 LBS | OXYGEN SATURATION: 100 % | RESPIRATION RATE: 18 BRPM | BODY MASS INDEX: 22.82 KG/M2 | SYSTOLIC BLOOD PRESSURE: 111 MMHG | HEIGHT: 71 IN

## 2024-12-26 LAB
ANION GAP SERPL CALCULATED.3IONS-SCNC: 11 MMOL/L (ref 7–15)
BUN SERPL-MCNC: 10.6 MG/DL (ref 8–23)
CALCIUM SERPL-MCNC: 8.4 MG/DL (ref 8.8–10.4)
CHLORIDE SERPL-SCNC: 102 MMOL/L (ref 98–107)
CREAT SERPL-MCNC: 0.66 MG/DL (ref 0.67–1.17)
EGFRCR SERPLBLD CKD-EPI 2021: >90 ML/MIN/1.73M2
ERYTHROCYTE [DISTWIDTH] IN BLOOD BY AUTOMATED COUNT: 13.2 % (ref 10–15)
GLUCOSE BLDC GLUCOMTR-MCNC: 119 MG/DL (ref 70–99)
GLUCOSE BLDC GLUCOMTR-MCNC: 97 MG/DL (ref 70–99)
GLUCOSE SERPL-MCNC: 95 MG/DL (ref 70–99)
HCO3 SERPL-SCNC: 21 MMOL/L (ref 22–29)
HCT VFR BLD AUTO: 38.1 % (ref 40–53)
HGB BLD-MCNC: 13.1 G/DL (ref 13.3–17.7)
MCH RBC QN AUTO: 30 PG (ref 26.5–33)
MCHC RBC AUTO-ENTMCNC: 34.4 G/DL (ref 31.5–36.5)
MCV RBC AUTO: 87 FL (ref 78–100)
PLATELET # BLD AUTO: 158 10E3/UL (ref 150–450)
POTASSIUM SERPL-SCNC: 3.7 MMOL/L (ref 3.4–5.3)
RBC # BLD AUTO: 4.36 10E6/UL (ref 4.4–5.9)
SODIUM SERPL-SCNC: 134 MMOL/L (ref 135–145)
WBC # BLD AUTO: 9.3 10E3/UL (ref 4–11)

## 2024-12-26 PROCEDURE — 272N000001 HC OR GENERAL SUPPLY STERILE: Performed by: NEUROLOGICAL SURGERY

## 2024-12-26 PROCEDURE — 85014 HEMATOCRIT: CPT

## 2024-12-26 PROCEDURE — G1010 CDSM STANSON: HCPCS | Performed by: RADIOLOGY

## 2024-12-26 PROCEDURE — 0HB0XZZ EXCISION OF SCALP SKIN, EXTERNAL APPROACH: ICD-10-PCS | Performed by: NEUROLOGICAL SURGERY

## 2024-12-26 PROCEDURE — 250N000013 HC RX MED GY IP 250 OP 250 PS 637

## 2024-12-26 PROCEDURE — G1010 CDSM STANSON: HCPCS

## 2024-12-26 PROCEDURE — 250N000011 HC RX IP 250 OP 636: Performed by: STUDENT IN AN ORGANIZED HEALTH CARE EDUCATION/TRAINING PROGRAM

## 2024-12-26 PROCEDURE — 258N000003 HC RX IP 258 OP 636

## 2024-12-26 PROCEDURE — 370N000017 HC ANESTHESIA TECHNICAL FEE, PER MIN: Performed by: NEUROLOGICAL SURGERY

## 2024-12-26 PROCEDURE — 999N000248 HC STATISTIC IV INSERT WITH US BY RN

## 2024-12-26 PROCEDURE — 999N000128 HC STATISTIC PERIPHERAL IV START W/O US GUIDANCE

## 2024-12-26 PROCEDURE — 36415 COLL VENOUS BLD VENIPUNCTURE: CPT

## 2024-12-26 PROCEDURE — 250N000011 HC RX IP 250 OP 636

## 2024-12-26 PROCEDURE — 250N000013 HC RX MED GY IP 250 OP 250 PS 637: Performed by: NEUROLOGICAL SURGERY

## 2024-12-26 PROCEDURE — 00U20JZ SUPPLEMENT DURA MATER WITH SYNTHETIC SUBSTITUTE, OPEN APPROACH: ICD-10-PCS | Performed by: NEUROLOGICAL SURGERY

## 2024-12-26 PROCEDURE — 00U207Z SUPPLEMENT DURA MATER WITH AUTOLOGOUS TISSUE SUBSTITUTE, OPEN APPROACH: ICD-10-PCS | Performed by: NEUROLOGICAL SURGERY

## 2024-12-26 PROCEDURE — 999N000141 HC STATISTIC PRE-PROCEDURE NURSING ASSESSMENT: Performed by: NEUROLOGICAL SURGERY

## 2024-12-26 PROCEDURE — 272N000004 HC RX 272: Performed by: NEUROLOGICAL SURGERY

## 2024-12-26 PROCEDURE — 80048 BASIC METABOLIC PNL TOTAL CA: CPT

## 2024-12-26 PROCEDURE — C1713 ANCHOR/SCREW BN/BN,TIS/BN: HCPCS | Performed by: NEUROLOGICAL SURGERY

## 2024-12-26 PROCEDURE — 250N000011 HC RX IP 250 OP 636: Performed by: NURSE ANESTHETIST, CERTIFIED REGISTERED

## 2024-12-26 PROCEDURE — 120N000002 HC R&B MED SURG/OB UMMC

## 2024-12-26 PROCEDURE — 360N000077 HC SURGERY LEVEL 4, PER MIN: Performed by: NEUROLOGICAL SURGERY

## 2024-12-26 PROCEDURE — 70450 CT HEAD/BRAIN W/O DYE: CPT | Mod: 26 | Performed by: RADIOLOGY

## 2024-12-26 PROCEDURE — 120N000003 HC R&B IMCU UMMC

## 2024-12-26 PROCEDURE — 710N000011 HC RECOVERY PHASE 1, LEVEL 3, PER MIN: Performed by: NEUROLOGICAL SURGERY

## 2024-12-26 PROCEDURE — 250N000009 HC RX 250

## 2024-12-26 PROCEDURE — 00Q20ZZ REPAIR DURA MATER, OPEN APPROACH: ICD-10-PCS | Performed by: NEUROLOGICAL SURGERY

## 2024-12-26 PROCEDURE — 82310 ASSAY OF CALCIUM: CPT

## 2024-12-26 PROCEDURE — 0JB80ZZ EXCISION OF ABDOMEN SUBCUTANEOUS TISSUE AND FASCIA, OPEN APPROACH: ICD-10-PCS | Performed by: NEUROLOGICAL SURGERY

## 2024-12-26 DEVICE — IMP SCR SYN MATRIX LOW PRO 1.5X04MM SELF DRILL 04.503.104.01: Type: IMPLANTABLE DEVICE | Site: CRANIAL | Status: FUNCTIONAL

## 2024-12-26 RX ORDER — SODIUM CHLORIDE 9 MG/ML
INJECTION, SOLUTION INTRAVENOUS CONTINUOUS
Status: ACTIVE | OUTPATIENT
Start: 2024-12-26 | End: 2024-12-27

## 2024-12-26 RX ORDER — AMOXICILLIN 250 MG
1 CAPSULE ORAL 2 TIMES DAILY
Status: DISCONTINUED | OUTPATIENT
Start: 2024-12-26 | End: 2024-12-26

## 2024-12-26 RX ORDER — PROCHLORPERAZINE MALEATE 5 MG/1
5 TABLET ORAL EVERY 6 HOURS PRN
Status: ACTIVE | OUTPATIENT
Start: 2024-12-26

## 2024-12-26 RX ORDER — CEFAZOLIN SODIUM/WATER 2 G/20 ML
2 SYRINGE (ML) INTRAVENOUS
Status: COMPLETED | OUTPATIENT
Start: 2024-12-26 | End: 2024-12-26

## 2024-12-26 RX ORDER — ONDANSETRON 2 MG/ML
4 INJECTION INTRAMUSCULAR; INTRAVENOUS EVERY 30 MIN PRN
Status: DISCONTINUED | OUTPATIENT
Start: 2024-12-26 | End: 2024-12-26

## 2024-12-26 RX ORDER — OXYCODONE HYDROCHLORIDE 5 MG/1
5 TABLET ORAL
OUTPATIENT
Start: 2024-12-26

## 2024-12-26 RX ORDER — PROPOFOL 10 MG/ML
INJECTION, EMULSION INTRAVENOUS PRN
Status: DISCONTINUED | OUTPATIENT
Start: 2024-12-26 | End: 2024-12-26

## 2024-12-26 RX ORDER — OXYCODONE HYDROCHLORIDE 10 MG/1
10 TABLET ORAL EVERY 4 HOURS PRN
Status: ACTIVE | OUTPATIENT
Start: 2024-12-26

## 2024-12-26 RX ORDER — ACETAMINOPHEN 325 MG/1
975 TABLET ORAL EVERY 8 HOURS
Status: DISPENSED | OUTPATIENT
Start: 2024-12-26

## 2024-12-26 RX ORDER — SODIUM CHLORIDE, SODIUM LACTATE, POTASSIUM CHLORIDE, CALCIUM CHLORIDE 600; 310; 30; 20 MG/100ML; MG/100ML; MG/100ML; MG/100ML
INJECTION, SOLUTION INTRAVENOUS CONTINUOUS
Status: DISCONTINUED | OUTPATIENT
Start: 2024-12-26 | End: 2024-12-26

## 2024-12-26 RX ORDER — DEXAMETHASONE SODIUM PHOSPHATE 4 MG/ML
4 INJECTION, SOLUTION INTRA-ARTICULAR; INTRALESIONAL; INTRAMUSCULAR; INTRAVENOUS; SOFT TISSUE
OUTPATIENT
Start: 2024-12-26

## 2024-12-26 RX ORDER — LIDOCAINE HYDROCHLORIDE 20 MG/ML
INJECTION, SOLUTION INFILTRATION; PERINEURAL PRN
Status: DISCONTINUED | OUTPATIENT
Start: 2024-12-26 | End: 2024-12-26

## 2024-12-26 RX ORDER — HYDROMORPHONE HCL IN WATER/PF 6 MG/30 ML
0.2 PATIENT CONTROLLED ANALGESIA SYRINGE INTRAVENOUS
Status: ACTIVE | OUTPATIENT
Start: 2024-12-26

## 2024-12-26 RX ORDER — SODIUM CHLORIDE, SODIUM GLUCONATE, SODIUM ACETATE, POTASSIUM CHLORIDE AND MAGNESIUM CHLORIDE 526; 502; 368; 37; 30 MG/100ML; MG/100ML; MG/100ML; MG/100ML; MG/100ML
INJECTION, SOLUTION INTRAVENOUS CONTINUOUS PRN
Status: DISCONTINUED | OUTPATIENT
Start: 2024-12-26 | End: 2024-12-26

## 2024-12-26 RX ORDER — FENTANYL CITRATE 50 UG/ML
50 INJECTION, SOLUTION INTRAMUSCULAR; INTRAVENOUS EVERY 5 MIN PRN
Status: DISCONTINUED | OUTPATIENT
Start: 2024-12-26 | End: 2024-12-26

## 2024-12-26 RX ORDER — ONDANSETRON 2 MG/ML
4 INJECTION INTRAMUSCULAR; INTRAVENOUS EVERY 6 HOURS PRN
Status: ACTIVE | OUTPATIENT
Start: 2024-12-26

## 2024-12-26 RX ORDER — NALOXONE HYDROCHLORIDE 0.4 MG/ML
0.1 INJECTION, SOLUTION INTRAMUSCULAR; INTRAVENOUS; SUBCUTANEOUS
Status: DISCONTINUED | OUTPATIENT
Start: 2024-12-26 | End: 2024-12-26

## 2024-12-26 RX ORDER — HYDROMORPHONE HCL IN WATER/PF 6 MG/30 ML
0.2 PATIENT CONTROLLED ANALGESIA SYRINGE INTRAVENOUS EVERY 5 MIN PRN
Status: DISCONTINUED | OUTPATIENT
Start: 2024-12-26 | End: 2024-12-26

## 2024-12-26 RX ORDER — BISACODYL 10 MG
10 SUPPOSITORY, RECTAL RECTAL DAILY PRN
Status: ACTIVE | OUTPATIENT
Start: 2024-12-29

## 2024-12-26 RX ORDER — DEXAMETHASONE SODIUM PHOSPHATE 4 MG/ML
INJECTION, SOLUTION INTRA-ARTICULAR; INTRALESIONAL; INTRAMUSCULAR; INTRAVENOUS; SOFT TISSUE PRN
Status: DISCONTINUED | OUTPATIENT
Start: 2024-12-26 | End: 2024-12-26

## 2024-12-26 RX ORDER — HYDROMORPHONE HCL IN WATER/PF 6 MG/30 ML
0.4 PATIENT CONTROLLED ANALGESIA SYRINGE INTRAVENOUS
Status: ACTIVE | OUTPATIENT
Start: 2024-12-26

## 2024-12-26 RX ORDER — FENTANYL CITRATE 50 UG/ML
25 INJECTION, SOLUTION INTRAMUSCULAR; INTRAVENOUS EVERY 5 MIN PRN
Status: DISCONTINUED | OUTPATIENT
Start: 2024-12-26 | End: 2024-12-26

## 2024-12-26 RX ORDER — ONDANSETRON 4 MG/1
4 TABLET, ORALLY DISINTEGRATING ORAL EVERY 30 MIN PRN
OUTPATIENT
Start: 2024-12-26

## 2024-12-26 RX ORDER — CEFAZOLIN SODIUM/WATER 2 G/20 ML
2 SYRINGE (ML) INTRAVENOUS SEE ADMIN INSTRUCTIONS
Status: DISCONTINUED | OUTPATIENT
Start: 2024-12-26 | End: 2024-12-26 | Stop reason: HOSPADM

## 2024-12-26 RX ORDER — ONDANSETRON 2 MG/ML
4 INJECTION INTRAMUSCULAR; INTRAVENOUS EVERY 6 HOURS PRN
Status: DISCONTINUED | OUTPATIENT
Start: 2024-12-26 | End: 2024-12-26

## 2024-12-26 RX ORDER — DEXAMETHASONE SODIUM PHOSPHATE 4 MG/ML
4 INJECTION, SOLUTION INTRA-ARTICULAR; INTRALESIONAL; INTRAMUSCULAR; INTRAVENOUS; SOFT TISSUE
Status: DISCONTINUED | OUTPATIENT
Start: 2024-12-26 | End: 2024-12-26

## 2024-12-26 RX ORDER — ONDANSETRON 2 MG/ML
4 INJECTION INTRAMUSCULAR; INTRAVENOUS EVERY 30 MIN PRN
OUTPATIENT
Start: 2024-12-26

## 2024-12-26 RX ORDER — ONDANSETRON 4 MG/1
4 TABLET, ORALLY DISINTEGRATING ORAL EVERY 30 MIN PRN
Status: DISCONTINUED | OUTPATIENT
Start: 2024-12-26 | End: 2024-12-26

## 2024-12-26 RX ORDER — POLYETHYLENE GLYCOL 3350 17 G/17G
17 POWDER, FOR SOLUTION ORAL DAILY
Status: ACTIVE | OUTPATIENT
Start: 2024-12-27

## 2024-12-26 RX ORDER — OXYCODONE HYDROCHLORIDE 10 MG/1
10 TABLET ORAL
OUTPATIENT
Start: 2024-12-26

## 2024-12-26 RX ORDER — OXYCODONE HYDROCHLORIDE 5 MG/1
5 TABLET ORAL EVERY 4 HOURS PRN
Status: ACTIVE | OUTPATIENT
Start: 2024-12-26

## 2024-12-26 RX ORDER — APREPITANT 40 MG/1
40 CAPSULE ORAL ONCE
Status: COMPLETED | OUTPATIENT
Start: 2024-12-26 | End: 2024-12-26

## 2024-12-26 RX ORDER — PROPOFOL 10 MG/ML
INJECTION, EMULSION INTRAVENOUS CONTINUOUS PRN
Status: DISCONTINUED | OUTPATIENT
Start: 2024-12-26 | End: 2024-12-26

## 2024-12-26 RX ORDER — LIDOCAINE 40 MG/G
CREAM TOPICAL
Status: ACTIVE | OUTPATIENT
Start: 2024-12-26

## 2024-12-26 RX ORDER — BISACODYL 10 MG
10 SUPPOSITORY, RECTAL RECTAL DAILY PRN
Status: DISCONTINUED | OUTPATIENT
Start: 2024-12-29 | End: 2024-12-26

## 2024-12-26 RX ORDER — METHOCARBAMOL 500 MG/1
500 TABLET, FILM COATED ORAL EVERY 6 HOURS PRN
Status: ACTIVE | OUTPATIENT
Start: 2024-12-26

## 2024-12-26 RX ORDER — HYDROMORPHONE HCL IN WATER/PF 6 MG/30 ML
0.4 PATIENT CONTROLLED ANALGESIA SYRINGE INTRAVENOUS EVERY 5 MIN PRN
Status: DISCONTINUED | OUTPATIENT
Start: 2024-12-26 | End: 2024-12-26

## 2024-12-26 RX ORDER — NALOXONE HYDROCHLORIDE 0.4 MG/ML
0.1 INJECTION, SOLUTION INTRAMUSCULAR; INTRAVENOUS; SUBCUTANEOUS
OUTPATIENT
Start: 2024-12-26

## 2024-12-26 RX ORDER — FENTANYL CITRATE 50 UG/ML
INJECTION, SOLUTION INTRAMUSCULAR; INTRAVENOUS PRN
Status: DISCONTINUED | OUTPATIENT
Start: 2024-12-26 | End: 2024-12-26

## 2024-12-26 RX ORDER — ONDANSETRON 2 MG/ML
INJECTION INTRAMUSCULAR; INTRAVENOUS PRN
Status: DISCONTINUED | OUTPATIENT
Start: 2024-12-26 | End: 2024-12-26

## 2024-12-26 RX ORDER — ONDANSETRON 4 MG/1
4 TABLET, ORALLY DISINTEGRATING ORAL EVERY 6 HOURS PRN
Status: DISCONTINUED | OUTPATIENT
Start: 2024-12-26 | End: 2024-12-26

## 2024-12-26 RX ORDER — EPHEDRINE SULFATE 50 MG/ML
INJECTION, SOLUTION INTRAMUSCULAR; INTRAVENOUS; SUBCUTANEOUS PRN
Status: DISCONTINUED | OUTPATIENT
Start: 2024-12-26 | End: 2024-12-26

## 2024-12-26 RX ORDER — PROCHLORPERAZINE MALEATE 5 MG/1
5 TABLET ORAL EVERY 6 HOURS PRN
Status: DISCONTINUED | OUTPATIENT
Start: 2024-12-26 | End: 2024-12-26

## 2024-12-26 RX ORDER — ONDANSETRON 4 MG/1
4 TABLET, ORALLY DISINTEGRATING ORAL EVERY 6 HOURS PRN
Status: ACTIVE | OUTPATIENT
Start: 2024-12-26

## 2024-12-26 RX ORDER — AMOXICILLIN 250 MG
1 CAPSULE ORAL 2 TIMES DAILY
Status: DISPENSED | OUTPATIENT
Start: 2024-12-26

## 2024-12-26 RX ORDER — POLYETHYLENE GLYCOL 3350 17 G/17G
17 POWDER, FOR SOLUTION ORAL DAILY
Status: DISCONTINUED | OUTPATIENT
Start: 2024-12-27 | End: 2024-12-26

## 2024-12-26 RX ADMIN — SENNOSIDES 8.6 MG: 8.6 TABLET, FILM COATED ORAL at 08:44

## 2024-12-26 RX ADMIN — LIDOCAINE HYDROCHLORIDE 100 MG: 20 INJECTION, SOLUTION INFILTRATION; PERINEURAL at 13:36

## 2024-12-26 RX ADMIN — PHENYLEPHRINE HYDROCHLORIDE 100 MCG: 10 INJECTION INTRAVENOUS at 14:42

## 2024-12-26 RX ADMIN — PROPOFOL 200 MG: 10 INJECTION, EMULSION INTRAVENOUS at 13:36

## 2024-12-26 RX ADMIN — SODIUM CHLORIDE 0.5 MCG/KG/MIN: 0.9 INJECTION, SOLUTION INTRAVENOUS at 14:27

## 2024-12-26 RX ADMIN — EPHEDRINE SULFATE 5 MG: 5 INJECTION INTRAVENOUS at 16:38

## 2024-12-26 RX ADMIN — SODIUM CHLORIDE: 9 INJECTION, SOLUTION INTRAVENOUS at 00:23

## 2024-12-26 RX ADMIN — PHENYLEPHRINE HYDROCHLORIDE 150 MCG: 10 INJECTION INTRAVENOUS at 14:04

## 2024-12-26 RX ADMIN — Medication 10 MG: at 14:57

## 2024-12-26 RX ADMIN — HYDROMORPHONE HYDROCHLORIDE 0.5 MG: 1 INJECTION, SOLUTION INTRAMUSCULAR; INTRAVENOUS; SUBCUTANEOUS at 16:19

## 2024-12-26 RX ADMIN — ACETAMINOPHEN 650 MG: 325 TABLET, FILM COATED ORAL at 02:44

## 2024-12-26 RX ADMIN — Medication 10 MG: at 16:27

## 2024-12-26 RX ADMIN — SODIUM CHLORIDE, SODIUM GLUCONATE, SODIUM ACETATE, POTASSIUM CHLORIDE AND MAGNESIUM CHLORIDE: 526; 502; 368; 37; 30 INJECTION, SOLUTION INTRAVENOUS at 13:36

## 2024-12-26 RX ADMIN — Medication 20 MG: at 15:32

## 2024-12-26 RX ADMIN — FENTANYL CITRATE 100 MCG: 50 INJECTION INTRAMUSCULAR; INTRAVENOUS at 13:36

## 2024-12-26 RX ADMIN — Medication 10 MG: at 16:16

## 2024-12-26 RX ADMIN — Medication 50 MG: at 13:38

## 2024-12-26 RX ADMIN — ACETAMINOPHEN 650 MG: 325 TABLET, FILM COATED ORAL at 08:44

## 2024-12-26 RX ADMIN — PHENYLEPHRINE HYDROCHLORIDE 200 MCG: 10 INJECTION INTRAVENOUS at 14:33

## 2024-12-26 RX ADMIN — Medication 2 G: at 13:45

## 2024-12-26 RX ADMIN — SENNOSIDES AND DOCUSATE SODIUM 1 TABLET: 8.6; 5 TABLET ORAL at 19:58

## 2024-12-26 RX ADMIN — PHENYLEPHRINE HYDROCHLORIDE 150 MCG: 10 INJECTION INTRAVENOUS at 13:48

## 2024-12-26 RX ADMIN — SODIUM CHLORIDE: 9 INJECTION, SOLUTION INTRAVENOUS at 20:09

## 2024-12-26 RX ADMIN — SODIUM CHLORIDE, SODIUM GLUCONATE, SODIUM ACETATE, POTASSIUM CHLORIDE AND MAGNESIUM CHLORIDE: 526; 502; 368; 37; 30 INJECTION, SOLUTION INTRAVENOUS at 14:44

## 2024-12-26 RX ADMIN — SODIUM CHLORIDE: 9 INJECTION, SOLUTION INTRAVENOUS at 09:52

## 2024-12-26 RX ADMIN — APREPITANT 40 MG: 40 CAPSULE ORAL at 12:51

## 2024-12-26 RX ADMIN — Medication 200 MG: at 17:20

## 2024-12-26 RX ADMIN — ROSUVASTATIN CALCIUM 10 MG: 10 TABLET, FILM COATED ORAL at 08:44

## 2024-12-26 RX ADMIN — SODIUM CHLORIDE, SODIUM GLUCONATE, SODIUM ACETATE, POTASSIUM CHLORIDE AND MAGNESIUM CHLORIDE: 526; 502; 368; 37; 30 INJECTION, SOLUTION INTRAVENOUS at 15:44

## 2024-12-26 RX ADMIN — Medication 20 MG: at 16:42

## 2024-12-26 RX ADMIN — DEXAMETHASONE SODIUM PHOSPHATE 8 MG: 4 INJECTION, SOLUTION INTRA-ARTICULAR; INTRALESIONAL; INTRAMUSCULAR; INTRAVENOUS; SOFT TISSUE at 13:36

## 2024-12-26 RX ADMIN — Medication 20 MG: at 14:11

## 2024-12-26 RX ADMIN — ONDANSETRON 4 MG: 2 INJECTION INTRAMUSCULAR; INTRAVENOUS at 16:56

## 2024-12-26 RX ADMIN — PHENYLEPHRINE HYDROCHLORIDE 150 MCG: 10 INJECTION INTRAVENOUS at 14:24

## 2024-12-26 RX ADMIN — PROPOFOL 150 MCG/KG/MIN: 10 INJECTION, EMULSION INTRAVENOUS at 13:36

## 2024-12-26 RX ADMIN — SODIUM CHLORIDE: 9 INJECTION, SOLUTION INTRAVENOUS at 06:54

## 2024-12-26 RX ADMIN — ACETAMINOPHEN 975 MG: 325 TABLET, FILM COATED ORAL at 19:58

## 2024-12-26 RX ADMIN — AMLODIPINE BESYLATE 5 MG: 5 TABLET ORAL at 08:44

## 2024-12-26 RX ADMIN — PHENYLEPHRINE HYDROCHLORIDE 100 MCG: 10 INJECTION INTRAVENOUS at 13:45

## 2024-12-26 RX ADMIN — PANTOPRAZOLE SODIUM 40 MG: 40 TABLET, DELAYED RELEASE ORAL at 08:44

## 2024-12-26 RX ADMIN — LISINOPRIL 40 MG: 40 TABLET ORAL at 08:44

## 2024-12-26 RX ADMIN — EPHEDRINE SULFATE 10 MG: 5 INJECTION INTRAVENOUS at 14:41

## 2024-12-26 RX ADMIN — PHENYLEPHRINE HYDROCHLORIDE 150 MCG: 10 INJECTION INTRAVENOUS at 13:53

## 2024-12-26 ASSESSMENT — ACTIVITIES OF DAILY LIVING (ADL)
ADLS_ACUITY_SCORE: 40

## 2024-12-26 NOTE — PROVIDER NOTIFICATION
Provider notified (name): Silviano JUNG  Reason for notification: PT Temp 102.6   Recommendation/request given to provider: prn tylenol given   Response from provider: give tylenol and bolus NS

## 2024-12-26 NOTE — ANESTHESIA PROCEDURE NOTES
Airway       Patient location during procedure: OR       Procedure Start/Stop Times: 12/26/2024 1:40 PM  Staff -        CRNA: Binh Beauchamp APRN CRNA       Performed By: CRNA  Consent for Airway        Urgency: elective  Indications and Patient Condition       Indications for airway management: nicole-procedural       Induction type:intravenous       Mask difficulty assessment: 2 - vent by mask + OA or adjuvant +/- NMBA    Final Airway Details       Final airway type: endotracheal airway       Successful airway: ETT - single  Endotracheal Airway Details        ETT size (mm): 7.5       Cuffed: yes       Successful intubation technique: video laryngoscopy       VL Blade Size: Glidescope 3       Grade View of Cords: 1       Adjucts: stylet       Measured from: gums/teeth       Secured at (cm): 22       Bite block used: None    Post intubation assessment        Placement verified by: capnometry, equal breath sounds and chest rise        Number of attempts at approach: 1       Number of other approaches attempted: 0       Secured with: tape       Ease of procedure: easy       Dentition: Intact and Unchanged    Medication(s) Administered   Medication Administration Time: 12/26/2024 1:40 PM    Additional Comments       Atraumatic intubation. +ETCO2 and bilateral breath sounds. Anterior airway.

## 2024-12-26 NOTE — PROGRESS NOTES
"CLINICAL NUTRITION SERVICES - ASSESSMENT NOTE     Nutrition Prescription    RECOMMENDATIONS FOR MDs/PROVIDERS TO ORDER:  When diet is advanced recommend ensure enlive 1-2 cartons daily     Malnutrition Status:    Unable to determine due to incomplete NFPE     Future/Additional Recommendations:  -- monitor diet advancement and need for nutritional supplements  -- obtain NFPE/nutrition history as able      REASON FOR ASSESSMENT  Vinh Mccarty is a/an 69 year old male assessed by the dietitian for Admission Nutrition Risk Screen for positive    Per chart review patient with a past medical history of 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 Per chart review patient with decreased appetite recently and recent weight loss.     CURRENT NUTRITION ORDERS  Diet: NPO    LABS  Labs reviewed  (12/26): Na 134 mmol/L (L), BUN 10.6 mg/dL, Cr 0.66 mg/dL (L)  (12/25): CRP 6.58 mg/L (H)    MEDICATIONS  Medications reviewed    ANTHROPOMETRICS  Height: 180.3 cm (5' 11\")  Most Recent Weight: 73.9 kg (163 lb)    IBW: 78.2 kg  BMI: Normal BMI   Weight History:   Wt Readings from Last 10 Encounters:   12/25/24 73.9 kg (163 lb)   10/29/24 74.6 kg (164 lb 7.4 oz)   07/10/24 77.1 kg (170 lb)   09/16/19 85.1 kg (187 lb 9.6 oz)   07/25/19 83.3 kg (183 lb 10.3 oz)   07/24/19 82.6 kg (182 lb)   06/17/19 86.8 kg (191 lb 6.4 oz)   05/05/14 83 kg (183 lb)   04/08/14 86.2 kg (190 lb)   02/05/14 82.6 kg (182 lb)   4.2% weight loss in 5 months - not significant   Dosing Weight: 74 kg (admission weight)    ASSESSED NUTRITION NEEDS  Estimated Energy Needs: 6118-5400 kcals/day (25 - 30 kcals/kg)  Justification: Maintenance  Estimated Protein Needs:  grams protein/day (1.2 - 1.5 grams of pro/kg)  Justification: Increased needs  Estimated Fluid Needs: (1 mL/kcal)   Justification: Maintenance    PHYSICAL FINDINGS  See malnutrition section below.    MALNUTRITION  % Intake: Unable to assess  % Weight Loss: " Weight loss does not meet criteria  Subcutaneous Fat Loss: Unable to assess  Muscle Loss: Unable to assess  Fluid Accumulation/Edema: None noted - per chart review   Malnutrition Diagnosis: Unable to determine due to incomplete NFPE     NUTRITION DIAGNOSIS  Unintended weight loss related to decreased appetite as evidenced by 4.2% weight loss in the last 5 months and chart review      INTERVENTIONS  Implementation  Nutrition Education: Unable to complete due to patient not available      Goals  Diet adv v nutrition support within 2-3 days.     Monitoring/Evaluation  Progress toward goals will be monitored and evaluated per protocol.  Heavenly Angulo MS/RD/LD/CNSC  Available on [employer]   M-F (7am-3:30pm) - 6A Clinical Dietitian  Weekend/Holiday Dietitian (7am-3:30pm)    ** Clinical Dietitians no longer available on pager

## 2024-12-26 NOTE — PLAN OF CARE
"Goal Outcome Evaluation:    Status: Admitted d/t fever and continued clear drainage from previous R meningioma resection site. Per pt, this site had been \"draining for weeks.\"   Found to be positive for Influenza A, placed on droplet precautions for 7 days. Started Tamiflu today.  Hx of HTN, HLD, meningioma r/x in 2019 and 10/28/2024.  Vitals: VSS on RA ex temp 101.0, MD aware PRN tylenol given x1.   Neuros: A&Ox4. R peripheral vision in R eye cloudy. [employer] [place] bilaterally.  IV: R PIV SL. Will start IVF at midnight, pump ordered.  Labs/Electrolytes: WNL,  WBC 7.7  Resp/trach: on RA. Denies SOB.  Diet: NPO changed to regular diet this shift. Tolerated meals and is aware of NPO at [facility] status  Bowel status: LBM PTA.  : self caths, supplies put in room. Per wife, it seems coude tip is what he uses at home. Cath'd self x1 with minimal assist (d/t tube being slightly different than as at home)cat 1300 for 400cc ramesh, slightly cloudy urine.  Skin: previous incision behind R ear  covered with 4x4's. Dressings saturated with mod to large amount clear fluid x4 today. Pillow case changed x3 and chux placed under pt's head. MD aware.  Pain: denies  Activity: up SBA  Social: patient wife in room with pt, supportive. Staying at nearby hotel  Plan: given obs handout, continue obs goals. Monitor for drainage and fever. NPO and IVF at [facility] for OR tomorrow, time unknown.                    "

## 2024-12-26 NOTE — ANESTHESIA CARE TRANSFER NOTE
Patient: Vinh Mccarty    Procedure: Procedure(s):  wound revision right head, combined-repar cerebrospinal fluid leak repair, abdominal fat graft, lumbar drain placement       Diagnosis: CSF leak [G96.00]  Diagnosis Additional Information: No value filed.    Anesthesia Type:   General     Note:    Oropharynx: oropharynx clear of all foreign objects and spontaneously breathing  Level of Consciousness: drowsy  Oxygen Supplementation: face mask  Level of Supplemental Oxygen (L/min / FiO2): 4  Independent Airway: airway patency satisfactory and stable  Dentition: dentition unchanged  Vital Signs Stable: post-procedure vital signs reviewed and stable  Report to RN Given: handoff report given  Patient transferred to: PACU    Handoff Report: Identifed the Patient, Identified the Reponsible Provider, Reviewed the pertinent medical history, Discussed the surgical course, Reviewed Intra-OP anesthesia mangement and issues during anesthesia, Set expectations for post-procedure period and Allowed opportunity for questions and acknowledgement of understanding      Vitals:  Vitals Value Taken Time   /70 12/26/24 1745   Temp     Pulse 77 12/26/24 1745   Resp 7 12/26/24 1745   SpO2 98 % 12/26/24 1745   Vitals shown include unfiled device data.    Electronically Signed By: Ulises Cao MD  December 26, 2024  5:45 PM

## 2024-12-26 NOTE — BRIEF OP NOTE
[facility]    Brief Operative Note    Pre-operative diagnosis: CSF leak [G96.00]  Post-operative diagnosis Same as pre-operative diagnosis    Procedure: wound revision right head, combined-repar cerebrospinal fluid leak repair, abdominal fat graft, lumbar drain placement, N/A - Head    Surgeon: Surgeons and Role:     * Matthew Fox MD - Primary     * Malaika Powell MD - Resident - Assisting  Anesthesia: General   Estimated Blood Loss: 50cc    Drains: Lumbar drain  Specimens: * No specimens in log *  Findings:   Small leak at the superior aspect of the suture line .  Complications: None.  Implants:   Implant Name Type Inv. Item Serial No.  Lot No. LRB No. Used Action   IMP SCR SYN MATRIX LOW PRO [id number] SELF DRILL [id number] - [employer] IMP SCR SYN MATRIX LOW PRO 1.5X04MM SELF DRILL [id number] NA SYNTHES-STRATEC  Right 5 Explanted   IMP SCR SYN MATRIX LOW PRO 1.5X04MM SELF DRILL [id number] - [employer] IMP SCR SYN MATRIX LOW PRO 1.5X04MM SELF DRILL [id number] NA [employer] NA Right 5 Implanted

## 2024-12-26 NOTE — PROGRESS NOTES
This patient returns with intermittent drainage from the right retroauricular incision. He does not have meningitis at this time.  We shall take him back to the OR for wound revision and repair of the CSF fistula in order to reduce the risk of infection. He and his wife understand the risks of infection, hemorrhage, need for additional surgery, and agree to proceed.  SHAHLA Fox MD

## 2024-12-26 NOTE — PLAN OF CARE
"Goal Outcome Evaluation:       /85 (BP Location: Right arm)   Pulse 82   Temp 99  F (37.2  C) (Oral)   Resp 20   Ht 1.803 m (5' 11\")   Wt 73.9 kg (163 lb)   SpO2 97%   BMI 22.73 kg/m      Activity: Up SBA  Neuros: A&O X4, R peripheral vision to R eye cloudy, [place] bilaterally.   Respiratory: on R/, no SOB or resp distress noted.   GI/:self cath, voided x2, no bm noted on this shift.    Diet: NPO @ midnight for schedule procedure.   Skin: previous  incision behind R ear covered with dry guaze, no clear fluids noted.   Lines: R PIV infusing NS @ 75  Pain/nausea: did c/o H/A, prn tylenol and schedule tylenol administer.   Plan: NPO for possibly lumbar drain.                  "

## 2024-12-26 NOTE — CONSULTS
"Care Management    Length of Stay (days): 1    Expected Discharge Date: 12/27/2024     Concerns to be Addressed:  Social Determinants of Health - Housing stability     Patient plan of care discussed at interdisciplinary rounds: Yes    Anticipated Discharge Disposition:  Not applicable at this time     Anticipated Discharge Services:  Not applicable at this time  Anticipated Discharge DME:  Not applicable at this time    Patient/family educated on Medicare website which has current facility and service quality ratings:  Not applicable at this time  Education Provided on the Discharge Plan:  Not applicable at this time  Patient/Family in Agreement with the Plan:  Not applicable at this time    Referrals Placed by CM/SW:  Not applicable at this time  Private pay costs discussed: Not applicable at this time    Discussed  Partnership in Safe Discharge Planning  document with patient/family: Not applicable at this time    Handoff Completed: Not applicable at this time    Additional Information:  SW received MD referral to see pt in regards to Social Determinants of Health - Housing stability.      Upon admit to [facility], nursing staff completed Social Determinants of Health questions and pt offered the following response in regards to housing stability:    Do you have housing?  No  Are you worried about losing your housing?  No.    IDANIA met with pt and introduced role of SW.  SW spoke with pt about his \"No\" response to whether or not pt has housing.  Pt states that this response was inaccurate.  Pt states that he owns a home, the home is paid for and housing is stable.  Pt states that he lives with his wife, step daughter and grand daughter.  Pt denies having any concerns in regards to housing stability.     Next Steps: SW to sign off.  SW available should add'l needs arise.    NINOSKA Henderson  Social Work, 6A  Phone:  [phone]  Pager:  [phone]  12/26/2024       JOSE ALEJANDRO Whitmore     "

## 2024-12-26 NOTE — ANESTHESIA PREPROCEDURE EVALUATION
Anesthesia Pre-Procedure Evaluation    Patient: Vinh Mccarty   MRN: [id number] : 1955        Procedure : Procedure(s):  Irrigation and debridement head, combined-repar csf leak-poss abd fat graft-poss lumbar drain placement          Past Medical History:   Diagnosis Date    HTN (hypertension) 10/10/2011    hyperlipidemia 10/10/2011      Past Surgical History:   Procedure Laterality Date    ARTHROSCOPY KNEE      LT; torn menicus knee    bakers cyst / removed      LT    COLONOSCOPY  2011    CRANIOTOMY, EXCISE TUMOR COMPLEX, COMBINED Right 2019    Procedure: Right Frontotemporal Craniotomy And Resection Of Tumor;  Surgeon: Matthew Fox MD;  Location: [facility] OR    CRANIOTOMY, EXCISE TUMOR COMPLEX, COMBINED Right 10/28/2024    Procedure: Right retrosigmoid approach for resection of meningioma;  Surgeon: Matthew Fox MD;  Location: [facility] OR    ELBOW SURGERY      Removal of pin    GLAUCOMA SURGERY      PROCURE GRAFT FAT Right 10/28/2024    Procedure: SURGICAL PROCUREMENT, Abdominal FAT GRAFT;  Surgeon: Matthew Fox MD;  Location: [facility] OR    surgically repaired/pins  1979    rt elbow fx      Allergies   Allergen Reactions    Cats      congestion    Horse Protein      Per allergy testing    Penicillins     Prabhakar Grasses       Social History     Tobacco Use    Smoking status: Never    Smokeless tobacco: Never   Substance Use Topics    Alcohol use: Yes     Comment: socially      Wt Readings from Last 1 Encounters:   24 73.9 kg (163 lb)        Anesthesia Evaluation   Pt has had prior anesthetic.     History of anesthetic complications  - PONV.      ROS/MED HX  ENT/Pulmonary:     (+) sleep apnea, doesn't use CPAP,                                      Neurologic: Comment: History of meningiomas, resected most recently on 10/28/24. Has had ongoing clear fluid drainage from surgical site after this surgery    Vision loss of right eye      Cardiovascular:      (+) Dyslipidemia hypertension- -   -  - -                                      METS/Exercise Tolerance: >4 METS    Hematologic:  - neg hematologic  ROS     Musculoskeletal:  - neg musculoskeletal ROS     GI/Hepatic:     (+) GERD,                   Renal/Genitourinary: Comment: Hx of urinary retention. Straight caths himself 4 times per day       Endo:  - neg endo ROS     Psychiatric/Substance Use:  - neg psychiatric ROS     Infectious Disease:  - neg infectious disease ROS     Malignancy:       Other: Comment: Glaucoma - neg other ROS          Physical Exam    Airway        Mallampati: II   TM distance: > 3 FB   Neck ROM: full   Mouth opening: > 3 cm    Respiratory Devices and Support         Dental       (+) Minor Abnormalities - some fillings, tiny chips      Cardiovascular   cardiovascular exam normal          Pulmonary   pulmonary exam normal                OUTSIDE LABS:  CBC:   Lab Results   Component Value Date    WBC 7.7 12/25/2024    WBC 6.9 12/25/2024    HGB 13.3 12/25/2024    HGB 15.0 12/25/2024    HCT 37.9 (L) 12/25/2024    HCT 43.6 12/25/2024     12/25/2024     12/25/2024     BMP:   Lab Results   Component Value Date     12/25/2024     12/25/2024    POTASSIUM 3.9 12/25/2024    POTASSIUM 3.7 12/25/2024    CHLORIDE 103 12/25/2024    CHLORIDE 101 12/25/2024    CO2 20 (L) 12/25/2024    CO2 22 12/25/2024    BUN 10.9 12/25/2024    BUN 11.0 12/25/2024    CR 0.63 (L) 12/25/2024    CR 0.78 12/25/2024     (H) 12/26/2024     (H) 12/25/2024     COAGS:   Lab Results   Component Value Date    PTT 28 12/25/2024    INR 1.12 12/25/2024     POC:   Lab Results   Component Value Date     (H) 07/26/2019     HEPATIC:   Lab Results   Component Value Date    ALT 34 07/26/2013     OTHER:   Lab Results   Component Value Date    BRAD 8.7 (L) 12/25/2024    PHOS 2.8 11/01/2024    MAG 2.1 11/01/2024    TSH 0.64 05/05/2014    SED 18 12/25/2024       Anesthesia Plan    ASA Status:  3     NPO Status:  NPO Appropriate    Anesthesia Type: General.     - Airway: ETT   Induction: Intravenous, Propofol.   Maintenance: Balanced.   Techniques and Equipment:     - Lines/Monitors: 2nd IV, Arterial Line     Consents    Anesthesia Plan(s) and associated risks, benefits, and realistic alternatives discussed. Questions answered and patient/representative(s) expressed understanding.     - Discussed: Risks, Benefits and Alternatives for BOTH SEDATION and the PROCEDURE were discussed     - Discussed with:  Patient       Use of blood products discussed: Yes.     - Discussed with: Patient.     Postoperative Care    Pain management: Multi-modal analgesia, IV analgesics, Oral pain medications.   PONV prophylaxis: Ondansetron (or other 5HT-3), Dexamethasone or Solumedrol, Background Propofol Infusion     Comments:               Ulises Cao MD    I have reviewed the pertinent notes and labs in the chart from the past 30 days and (re)examined the patient.  Any updates or changes from those notes are reflected in this note.               # Hypertension: Noted on problem list

## 2024-12-27 ENCOUNTER — APPOINTMENT (OUTPATIENT)
Dept: PHYSICAL THERAPY | Facility: CLINIC | Age: 69
End: 2024-12-27
Payer: MEDICARE

## 2024-12-27 LAB
ANION GAP SERPL CALCULATED.3IONS-SCNC: 10 MMOL/L (ref 7–15)
BUN SERPL-MCNC: 11.2 MG/DL (ref 8–23)
CALCIUM SERPL-MCNC: 8.3 MG/DL (ref 8.8–10.4)
CHLORIDE SERPL-SCNC: 107 MMOL/L (ref 98–107)
CREAT SERPL-MCNC: 0.5 MG/DL (ref 0.67–1.17)
EGFRCR SERPLBLD CKD-EPI 2021: >90 ML/MIN/1.73M2
ERYTHROCYTE [DISTWIDTH] IN BLOOD BY AUTOMATED COUNT: 13 % (ref 10–15)
GLUCOSE SERPL-MCNC: 135 MG/DL (ref 70–99)
HCO3 SERPL-SCNC: 21 MMOL/L (ref 22–29)
HCT VFR BLD AUTO: 35.7 % (ref 40–53)
HGB BLD-MCNC: 12.2 G/DL (ref 13.3–17.7)
MCH RBC QN AUTO: 30.2 PG (ref 26.5–33)
MCHC RBC AUTO-ENTMCNC: 34.2 G/DL (ref 31.5–36.5)
MCV RBC AUTO: 88 FL (ref 78–100)
PLATELET # BLD AUTO: 142 10E3/UL (ref 150–450)
POTASSIUM SERPL-SCNC: 4 MMOL/L (ref 3.4–5.3)
RBC # BLD AUTO: 4.04 10E6/UL (ref 4.4–5.9)
SODIUM SERPL-SCNC: 138 MMOL/L (ref 135–145)
WBC # BLD AUTO: 7.4 10E3/UL (ref 4–11)

## 2024-12-27 PROCEDURE — 82435 ASSAY OF BLOOD CHLORIDE: CPT

## 2024-12-27 PROCEDURE — 999N000111 HC STATISTIC OT IP EVAL DEFER: Performed by: OCCUPATIONAL THERAPIST

## 2024-12-27 PROCEDURE — 120N000002 HC R&B MED SURG/OB UMMC

## 2024-12-27 PROCEDURE — 97161 PT EVAL LOW COMPLEX 20 MIN: CPT | Mod: GP

## 2024-12-27 PROCEDURE — 120N000003 HC R&B IMCU UMMC

## 2024-12-27 PROCEDURE — 85018 HEMOGLOBIN: CPT

## 2024-12-27 PROCEDURE — 250N000013 HC RX MED GY IP 250 OP 250 PS 637

## 2024-12-27 PROCEDURE — 97530 THERAPEUTIC ACTIVITIES: CPT | Mod: GP

## 2024-12-27 PROCEDURE — 258N000003 HC RX IP 258 OP 636

## 2024-12-27 PROCEDURE — 36415 COLL VENOUS BLD VENIPUNCTURE: CPT

## 2024-12-27 PROCEDURE — 97116 GAIT TRAINING THERAPY: CPT | Mod: GP

## 2024-12-27 PROCEDURE — 250N000013 HC RX MED GY IP 250 OP 250 PS 637: Performed by: NEUROLOGICAL SURGERY

## 2024-12-27 RX ORDER — TAMSULOSIN HYDROCHLORIDE 0.4 MG/1
0.4 CAPSULE ORAL DAILY
COMMUNITY

## 2024-12-27 RX ADMIN — SODIUM CHLORIDE: 9 INJECTION, SOLUTION INTRAVENOUS at 23:05

## 2024-12-27 RX ADMIN — ACETAMINOPHEN 975 MG: 325 TABLET, FILM COATED ORAL at 19:50

## 2024-12-27 RX ADMIN — ACETAMINOPHEN 975 MG: 325 TABLET, FILM COATED ORAL at 04:04

## 2024-12-27 RX ADMIN — SENNOSIDES AND DOCUSATE SODIUM 1 TABLET: 8.6; 5 TABLET ORAL at 19:49

## 2024-12-27 RX ADMIN — AMLODIPINE BESYLATE 5 MG: 5 TABLET ORAL at 07:56

## 2024-12-27 RX ADMIN — SODIUM CHLORIDE: 9 INJECTION, SOLUTION INTRAVENOUS at 10:08

## 2024-12-27 RX ADMIN — PANTOPRAZOLE SODIUM 40 MG: 40 TABLET, DELAYED RELEASE ORAL at 15:30

## 2024-12-27 RX ADMIN — PANTOPRAZOLE SODIUM 40 MG: 40 TABLET, DELAYED RELEASE ORAL at 07:56

## 2024-12-27 RX ADMIN — LISINOPRIL 40 MG: 40 TABLET ORAL at 07:56

## 2024-12-27 RX ADMIN — ROSUVASTATIN CALCIUM 10 MG: 10 TABLET, FILM COATED ORAL at 07:55

## 2024-12-27 RX ADMIN — ACETAMINOPHEN 975 MG: 325 TABLET, FILM COATED ORAL at 12:26

## 2024-12-27 RX ADMIN — SENNOSIDES AND DOCUSATE SODIUM 1 TABLET: 8.6; 5 TABLET ORAL at 07:55

## 2024-12-27 ASSESSMENT — ACTIVITIES OF DAILY LIVING (ADL)
ADLS_ACUITY_SCORE: 39
ADLS_ACUITY_SCORE: 41
ADLS_ACUITY_SCORE: 39
ADLS_ACUITY_SCORE: 41
ADLS_ACUITY_SCORE: 40
ADLS_ACUITY_SCORE: 39
ADLS_ACUITY_SCORE: 40
ADLS_ACUITY_SCORE: 41
ADLS_ACUITY_SCORE: 39
ADLS_ACUITY_SCORE: 40
ADLS_ACUITY_SCORE: 39
ADLS_ACUITY_SCORE: 41
ADLS_ACUITY_SCORE: 41
ADLS_ACUITY_SCORE: 39
ADLS_ACUITY_SCORE: 41
ADLS_ACUITY_SCORE: 41
ADLS_ACUITY_SCORE: 39
ADLS_ACUITY_SCORE: 39
ADLS_ACUITY_SCORE: 41

## 2024-12-27 NOTE — PLAN OF CARE
Goal Outcome Evaluation:      Plan of Care Reviewed With: patient    Overall Patient Progress: improvingOverall Patient Progress: improving    Outcome Evaluation: Pt in OR for most of the shift for LD placement    Status: Admitted d/t clear drainage from previous R petrous ride meningioma resection site. Found to be positive for Influenza A, placed on droplet precautions for 7 days. Hx of HTN, HLD, meningioma r/x in 2019 and 10/28/2024   Vitals: VSS on RA except fever of 100.3 F  Neuros: A&OX4. Strengths 5/5  IV: PIV running 75 mL/h NS before surgery  Resp/trach: WDL. Denies SOB  Diet: NPO before surgery  Bowel status: LBM 12/26 per pt  : Self caths. Supplies in room  Skin: Previous incision behind R ear. Covered with dry gauze. No CSF drainage noted this shift  Pain: 1/10 before procedure. Managed with scheduled Tylenol  Activity: SBA  Social: Wife at bedside   Plan: LD placed in OR this shift. Droplet precautions in place for positive Flu A. Continue to monitor and follow POC  Updates this shift: Pt left for surgery around 09:30

## 2024-12-27 NOTE — ANESTHESIA POSTPROCEDURE EVALUATION
Patient: Vinh Mccarty    Procedure: Procedure(s):  wound revision right head, combined-repar cerebrospinal fluid leak repair, abdominal fat graft, lumbar drain placement       Anesthesia Type:  General    Note:  Disposition: Inpatient   Postop Pain Control: Uneventful            Sign Out: Well controlled pain   PONV: No   Neuro/Psych: Uneventful            Sign Out: Acceptable/Baseline neuro status (Lumbar drain in situ; patent)   Airway/Respiratory: Uneventful            Sign Out: Acceptable/Baseline resp. status   CV/Hemodynamics: Uneventful            Sign Out: Acceptable CV status; No obvious hypovolemia; No obvious fluid overload   Other NRE: NONE   DID A NON-ROUTINE EVENT OCCUR? No           Last vitals:  Vitals Value Taken Time   BP 99/63 12/26/24 1800   Temp 36.6  C (97.9  F) 12/26/24 1737   Pulse 73 12/26/24 1807   Resp 18 12/26/24 1807   SpO2 94 % 12/26/24 1807   Vitals shown include unfiled device data.    Electronically Signed By: Chitra Prado MD  December 26, 2024  6:08 PM

## 2024-12-27 NOTE — PLAN OF CARE
Goal Outcome Evaluation:      Plan of Care Reviewed With: patient    Overall Patient Progress: improvingOverall Patient Progress: improving    Outcome Evaluation: Walked around unit x2    Status: S/p wound revision right head, combined-repar cerebrospinal fluid leak repair, abdominal fat graft, lumbar drain placement 12/27. Found to be positive for Influenza A, placed on droplet precautions for 7 days.   Vitals: VSS ex bradycardic.   Neuros: [place]. Vision in R eye cloudy.   IV: R PIV running NS at 75ml/hr. L PIV SL  Labs/Electrolytes: .  Resp/trach: LSC on RA  Diet: Regular  Bowel status: BM this shift   : Self caths self at baseline w/ Rns assistance.   Skin: R abd graft site;covered with primapore., LD dressing-CDI, R incision covered with primapore;CDI  Pain: Mild Ha managed with scheduled Tylenol   Activity: SBA/ GB  Social: Wife here, very supportive  Updates/Plan: Continue LD draining 10mL/hr. Monitor bandage for CSF leak.

## 2024-12-27 NOTE — ANESTHESIA POSTPROCEDURE EVALUATION
Patient: Vinh Mccarty    Procedure: Procedure(s):  wound revision right head, combined-repar cerebrospinal fluid leak repair, abdominal fat graft, lumbar drain placement       Anesthesia Type:  General    Note:  Disposition: Inpatient   Postop Pain Control: Uneventful            Sign Out: Well controlled pain   PONV: No   Neuro/Psych: Uneventful            Sign Out: Acceptable/Baseline neuro status   Airway/Respiratory: Uneventful            Sign Out: Acceptable/Baseline resp. status   CV/Hemodynamics: Uneventful            Sign Out: Acceptable CV status; No obvious hypovolemia; No obvious fluid overload   Other NRE: NONE   DID A NON-ROUTINE EVENT OCCUR? No           Last vitals:  Vitals Value Taken Time   /69 12/26/24 1845   Temp 36.6  C (97.8  F) 12/26/24 1830   Pulse 73 12/26/24 1859   Resp 26 12/26/24 1859   SpO2 98 % 12/26/24 1859   Vitals shown include unfiled device data.    Electronically Signed By: Dany Ruiz MD  December 26, 2024  7:51 PM

## 2024-12-27 NOTE — PROVIDER NOTIFICATION
Pt pillow case damp with clear  fluid, pt didn't know if it was sweat or leak.     Provider assessed at bedside - changed primapore. No new orders.

## 2024-12-27 NOTE — PHARMACY-ADMISSION MEDICATION HISTORY
Pharmacist Admission Medication History    Admission medication history is complete. The information provided in this note is only as accurate as the sources available at the time of the update.    Information Source(s): Family member, Patient's pharmacy, and CareEverywhere/SureScripts via phone    Pertinent Information: Medication reconciliation completed over the phone with patient's spouse (Brea) and preferred pharmacy ([place], [place] Phone # [phone]. Of note:  Patient had recent surgery (craniotomy) late October and was prescribed Oxycodone for breakthrough pain. He still has some on hand but will use tylenol first-line, of which, has managed pain well.   APAP, omeprazole (acid reflux), Systane Ultra eye drops (dry eyes), and Ocean nasal spray are PRN.  Has bladder study appointment 1/02/2025 and was told by provider to continue tamsulosin for urinary retention until further notice.     Changes made to PTA medication list:  Added:   Tamsulosin (FLOMAX) 0.4 mg capsule - Take 1 capsule by mouth daily  Deleted: None  Changed: None    Allergies reviewed with patient and updates made in EHR: yes    Medication History Completed By: Danny Mckeon, PGY-1 Pharmacy Resident  12/27/2024 10:21 AM    PTA Med List   Medication Sig Last Dose/Taking    acetaminophen (TYLENOL) 325 MG tablet Take 2 tablets (650 mg) by mouth every 4 hours as needed for other (multimodal surgical pain management along with NSAIDS and opioid medication as indicated based on pain control and physical function.) Unknown    amLODIPine (NORVASC) 5 MG tablet Take 5 mg by mouth daily. 12/24/2024 Morning    CRESTOR 10 MG tablet TAKE 1 TABLET (10 MG) BY MOUTH DAILY 12/24/2024 Morning    omeprazole (PRILOSEC) 40 MG DR capsule TAKE 1 CAPSULE (40MG) BY MOUTH ONCE DAILY AS NEEDED FOR REFLUX Unknown    polyethylene glycol-propylene glycol (SYSTANE ULTRA) 0.4-0.3 % SOLN ophthalmic solution Place 1 drop into both eyes every hour as needed for dry  eyes. Unknown    ramipril (ALTACE) 10 MG capsule Take 10 mg by mouth daily. 12/24/2024 Morning    sodium chloride (OCEAN) 0.65 % nasal spray Spray 1 spray in nostril daily as needed. Unknown    tamsulosin (FLOMAX) 0.4 MG capsule Take 0.4 mg by mouth daily. 12/24/2024 Morning

## 2024-12-27 NOTE — PROGRESS NOTES
"   12/27/24 0800   Appointment Info   Signing Clinician's Name / Credentials (PT) Catalina Spence PT, DPT   Rehab Comments (PT) LD   Living Environment   People in Home spouse;child(rubi), adult;grandchild(rubi)   Current Living Arrangements house   Home Accessibility stairs to enter home;stairs within home   Number of Stairs, Main Entrance 3   Stair Railings, Main Entrance railings on both sides of stairs   Number of Stairs, Within Home, Primary greater than 10 stairs   Stair Railings, Within Home, Primary railings safe and in good condition   Transportation Anticipated family or friend will provide   Living Environment Comments Lives in house with wife, daughter, and 13 y.o. granddaughter. 3 PIET[address], has basement but all needs met on main level   Self-Care   Usual Activity Tolerance good   Current Activity Tolerance moderate   Equipment Currently Used at Home walker, rolling;cane, straight   Fall history within last six months no   Activity/Exercise/Self-Care Comment Previously IND with all mobility and ADLs, uses no AD at baseline. 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 After surgery, his vision remained poor. 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 Given the interval growht, surgical intervention was performed on 10/28/2024 (with Dr. Powell from Neuro-Otology, and Dr. Fox, Neurosurgery). His post-operative course was complicated by asymptomatic bradycardia and an asympomtatic sinus thrombosis managed with IV fluids. " "On 11/19, he had a telephone visit with neurosurgery to assess post-operative recovery. At this time, he complained of some clear drainage from the surgical incision, which was slowly improving but persistent. He additionally complained of ongoing urinary retention requiring a smiley catheter. At this time he was asked to present to the ED if he developed worsening drianage, swelling, eryhthema or fevers. Today, he reported the development of a flu with ongoing drainage from his incision. He initially presented to a local ED, however, was asked to [facility] to the [facility]. On interview, he endorses a mild headache, but denies any focal weakness, numbness. He currently has a dry incision site. He reports his flu-like symptoms started a few days ago and have included upper respiratory congestion and headaches. He denies any neck rigidity or tenderness. He denies any erythema surrounding the incision, or any focal tenderness. He endorses ongoing clear drainage from the site. Now s/p lumbar drain placement 12/26/2024.\"   General Observations activity, up with assist   Cognition   Affect/Mental Status (Cognition) WFL   Pain Assessment   Patient Currently in Pain Yes, see Vital Sign flowsheet   Integumentary/Edema   Integumentary/Edema no deficits were identifed   Posture    Posture Forward head position   Range of Motion (ROM)   Range of Motion ROM is WFL   Strength (Manual Muscle Testing)   Strength (Manual Muscle Testing) strength is WFL   Strength Comments did not formally assess but pt demo antigravity strength by completing STS with no additional physical assist   Bed Mobility   Comment, (Bed Mobility) supine>sit SBA   Transfers   Comment, (Transfers) STS with IV pole and SBA   Gait/Stairs (Locomotion)   Comment, (Gait/Stairs) amb with IV pole and CGA-SBA   Balance   Balance Comments good static standing balance, good dynamic balance with IV pole   Sensory Examination   Sensory Perception patient reports no sensory " changes   Clinical Impression   Criteria for Skilled Therapeutic Intervention Yes, treatment indicated   PT Diagnosis (PT) impaired functional mobility   Influenced by the following impairments impaired balance, decreased activity tolerance   Functional limitations due to impairments community amb, stairs, higher level balance   Clinical Presentation (PT Evaluation Complexity) stable   Clinical Presentation Rationale clinical reasoning   Clinical Decision Making (Complexity) low complexity   Planned Therapy Interventions (PT) balance training;bed mobility training;gait training;home exercise program;motor coordination training;neuromuscular re-education;patient/family education;postural re-education;ROM (range of motion);stair training;strengthening;stretching;transfer training;progressive activity/exercise;risk factor education;home program guidelines   Risk & Benefits of therapy have been explained care plan/treatment goals reviewed;evaluation/treatment results reviewed;risks/benefits reviewed;current/potential barriers reviewed;participants voiced agreement with care plan;participants included;patient   Clinical Impression Comments Pt is slightly below IND mobility baseline, limited by impaired balance and decreased activity tolerance. Will benefit from skilled PT during LOS to improve impairments and progress back to IND baseline.   PT Total Evaluation Time   PT Arturo, Low Complexity Minutes (30668) 9   Physical Therapy Goals   PT Frequency 6x/week   PT Predicted Duration/Target Date for Goal Attainment 01/16/25   PT Goals Bed Mobility;Transfers;Gait;Stairs;Aerobic Activity   PT: Bed Mobility Independent;Supine to/from sit   PT: Transfers Independent;Sit to/from stand;Bed to/from chair   PT: Gait Independent;Greater than 200 feet   PT: Stairs Modified independent;3 stairs;Rail on both sides   PT: Perform aerobic activity with stable cardiovascular response continuous activity;10 minutes;15  minutes;ambulation;NuStep   PT Discharge Planning   PT Plan gait IND with no AD, stairs, dynamic balance with no AD   PT Discharge Recommendation (DC Rec) home with assist   PT Rationale for DC Rec Pt is slightly below IND mobility baseline, limited by impaired balance and decreased activity tolerance, needs assist with lines currently. Has good support from family. Anticipate safe d/c home with assist once medically appropriate.   PT Brief overview of current status SBA with IV pole, amb 3-4x/day with RN   Physical Therapy Time and Intention   Total Session Time (sum of timed and untimed services) 9

## 2024-12-27 NOTE — PLAN OF CARE
Status: : Admitted d/t clear drainage from previous R petrous ride meningioma resection site. Found to be positive for Influenza A, placed on droplet precautions for 7 days. Hx of HTN, HLD, meningioma r/x in 2019 and 10/28/2024   Vitals: Pee in mid and high 40's at baseline - MD notified. VSS on RA. CCM  Neuros: Aox4. 5/5 strengths throughout. [place].  IV: R PIV running NS at 75ml/hr. L piv SL  Labs/Electrolytes: Na low 12/26.   Resp/trach: LSC on RA  Diet: Regular  Bowel status: LBM PTA  : Last self cath was at 0630 450ml  Skin: R abd graft site, LD, R incision covered with primapore.  Pain: Managed with scheduled tylenol  Activity: SBA/ GB  Social: Wife here in pm, very supportive  Plan: Cont POC  Updates this shift: Clear drainage found on pillow x2- Provider assessed at bedside, changed primapore, stated to keep an eye on it. Drainage slightly darker - provider notified.     Goal Outcome Evaluation:      Plan of Care Reviewed With: patient    Overall Patient Progress: improvingOverall Patient Progress: improving

## 2024-12-27 NOTE — PLAN OF CARE
OT order received and chart reviewed.  Per discussion with evaluating PT, no skilled OT needs identified.  Patient is SBA/CGA for transfers/mobility, and has assist at home.  PT will follow for activity progression. Defer discharge recommendations to PT and medical team.  Will complete orders.

## 2024-12-28 LAB
ALBUMIN SERPL BCG-MCNC: 3.1 G/DL (ref 3.5–5.2)
ANION GAP SERPL CALCULATED.3IONS-SCNC: 9 MMOL/L (ref 7–15)
BUN SERPL-MCNC: 11.3 MG/DL (ref 8–23)
CALCIUM SERPL-MCNC: 7.7 MG/DL (ref 8.8–10.4)
CHLORIDE SERPL-SCNC: 111 MMOL/L (ref 98–107)
CREAT SERPL-MCNC: 0.55 MG/DL (ref 0.67–1.17)
EGFRCR SERPLBLD CKD-EPI 2021: >90 ML/MIN/1.73M2
ERYTHROCYTE [DISTWIDTH] IN BLOOD BY AUTOMATED COUNT: 13.2 % (ref 10–15)
GLUCOSE SERPL-MCNC: 87 MG/DL (ref 70–99)
HCO3 SERPL-SCNC: 20 MMOL/L (ref 22–29)
HCT VFR BLD AUTO: 34.9 % (ref 40–53)
HGB BLD-MCNC: 12 G/DL (ref 13.3–17.7)
MCH RBC QN AUTO: 30.6 PG (ref 26.5–33)
MCHC RBC AUTO-ENTMCNC: 34.4 G/DL (ref 31.5–36.5)
MCV RBC AUTO: 89 FL (ref 78–100)
PLAT MORPH BLD: NORMAL
PLATELET # BLD AUTO: 143 10E3/UL (ref 150–450)
POTASSIUM SERPL-SCNC: 3.4 MMOL/L (ref 3.4–5.3)
RBC # BLD AUTO: 3.92 10E6/UL (ref 4.4–5.9)
RBC MORPH BLD: NORMAL
SODIUM SERPL-SCNC: 140 MMOL/L (ref 135–145)
WBC # BLD AUTO: 7.2 10E3/UL (ref 4–11)

## 2024-12-28 PROCEDURE — 120N000002 HC R&B MED SURG/OB UMMC

## 2024-12-28 PROCEDURE — 250N000013 HC RX MED GY IP 250 OP 250 PS 637: Performed by: STUDENT IN AN ORGANIZED HEALTH CARE EDUCATION/TRAINING PROGRAM

## 2024-12-28 PROCEDURE — 250N000013 HC RX MED GY IP 250 OP 250 PS 637: Performed by: NEUROLOGICAL SURGERY

## 2024-12-28 PROCEDURE — 80048 BASIC METABOLIC PNL TOTAL CA: CPT

## 2024-12-28 PROCEDURE — 250N000013 HC RX MED GY IP 250 OP 250 PS 637

## 2024-12-28 PROCEDURE — 36415 COLL VENOUS BLD VENIPUNCTURE: CPT

## 2024-12-28 PROCEDURE — 82040 ASSAY OF SERUM ALBUMIN: CPT | Performed by: NEUROLOGICAL SURGERY

## 2024-12-28 PROCEDURE — 250N000011 HC RX IP 250 OP 636

## 2024-12-28 PROCEDURE — 120N000003 HC R&B IMCU UMMC

## 2024-12-28 PROCEDURE — 85027 COMPLETE CBC AUTOMATED: CPT

## 2024-12-28 RX ORDER — CALCIUM CARBONATE 500(1250)
500 TABLET ORAL 2 TIMES DAILY WITH MEALS
Status: DISCONTINUED | OUTPATIENT
Start: 2024-12-28 | End: 2024-12-30 | Stop reason: HOSPADM

## 2024-12-28 RX ORDER — CALCIUM GLUCONATE 20 MG/ML
2 INJECTION, SOLUTION INTRAVENOUS ONCE
Status: DISCONTINUED | OUTPATIENT
Start: 2024-12-28 | End: 2024-12-28

## 2024-12-28 RX ORDER — HEPARIN SODIUM 5000 [USP'U]/.5ML
5000 INJECTION, SOLUTION INTRAVENOUS; SUBCUTANEOUS EVERY 8 HOURS
Status: DISCONTINUED | OUTPATIENT
Start: 2024-12-28 | End: 2024-12-30 | Stop reason: HOSPADM

## 2024-12-28 RX ADMIN — SENNOSIDES AND DOCUSATE SODIUM 1 TABLET: 8.6; 5 TABLET ORAL at 08:07

## 2024-12-28 RX ADMIN — PANTOPRAZOLE SODIUM 40 MG: 40 TABLET, DELAYED RELEASE ORAL at 16:26

## 2024-12-28 RX ADMIN — HEPARIN SODIUM 5000 UNITS: 5000 INJECTION, SOLUTION INTRAVENOUS; SUBCUTANEOUS at 23:55

## 2024-12-28 RX ADMIN — ACETAMINOPHEN 975 MG: 325 TABLET, FILM COATED ORAL at 04:01

## 2024-12-28 RX ADMIN — CALCIUM 500 MG: 500 TABLET ORAL at 17:57

## 2024-12-28 RX ADMIN — POLYETHYLENE GLYCOL 3350 17 G: 17 POWDER, FOR SOLUTION ORAL at 08:07

## 2024-12-28 RX ADMIN — SENNOSIDES AND DOCUSATE SODIUM 1 TABLET: 8.6; 5 TABLET ORAL at 20:09

## 2024-12-28 RX ADMIN — ACETAMINOPHEN 975 MG: 325 TABLET, FILM COATED ORAL at 12:06

## 2024-12-28 RX ADMIN — ACETAMINOPHEN 975 MG: 325 TABLET, FILM COATED ORAL at 20:09

## 2024-12-28 RX ADMIN — AMLODIPINE BESYLATE 5 MG: 5 TABLET ORAL at 08:08

## 2024-12-28 RX ADMIN — LISINOPRIL 40 MG: 40 TABLET ORAL at 08:09

## 2024-12-28 RX ADMIN — HEPARIN SODIUM 5000 UNITS: 5000 INJECTION, SOLUTION INTRAVENOUS; SUBCUTANEOUS at 09:48

## 2024-12-28 RX ADMIN — HEPARIN SODIUM 5000 UNITS: 5000 INJECTION, SOLUTION INTRAVENOUS; SUBCUTANEOUS at 16:26

## 2024-12-28 RX ADMIN — ROSUVASTATIN CALCIUM 10 MG: 10 TABLET, FILM COATED ORAL at 08:08

## 2024-12-28 ASSESSMENT — ACTIVITIES OF DAILY LIVING (ADL)
ADLS_ACUITY_SCORE: 40
ADLS_ACUITY_SCORE: 39
ADLS_ACUITY_SCORE: 41
ADLS_ACUITY_SCORE: 39
ADLS_ACUITY_SCORE: 41
ADLS_ACUITY_SCORE: 40
ADLS_ACUITY_SCORE: 41
ADLS_ACUITY_SCORE: 40
ADLS_ACUITY_SCORE: 41
ADLS_ACUITY_SCORE: 40
ADLS_ACUITY_SCORE: 41
ADLS_ACUITY_SCORE: 41

## 2024-12-28 NOTE — PLAN OF CARE
Status: : Admitted d/t clear drainage from previous R petrous ride meningioma resection site. Found to be positive for Influenza A, placed on droplet precautions for 7 days. Hx of HTN, HLD, meningioma r/x in 2019 and 10/28/2024   Vitals: Pee in mid and high 40's at baseline - MD aware. VSS on RA. CCM  Neuros: Aox4. 5/5 strengths throughout. [place].  IV: R PIV running NS at 75ml/hr. L PIV SL  Labs/Electrolytes: Ca low 8.3   Resp/trach: LSC on RA  Diet: Regular  Bowel status: LBM 12/27  : Last self cath w RN assistance at 0600 was 750ml  Skin: R abd graft site - covered with primapore. LD dressing-CDI, R incision covered with primapore - CDI   Pain: Managed with scheduled tylenol  Activity: SBA/ GB  Plan: Cont POC    Goal Outcome Evaluation:      Plan of Care Reviewed With: patient    Overall Patient Progress: improvingOverall Patient Progress: improving

## 2024-12-28 NOTE — PROGRESS NOTES
"[facility], [place]   12/27/2024  Neurosurgery Progress Note:    Interval History: No acute events overnight. Concern for leakage brought up by nursing team, dressing dry and no active leakage observed from the wound.     Objective:   Temp:  [97.7  F (36.5  C)-98.1  F (36.7  C)] 97.8  F (36.6  C)  Pulse:  [50-73] 58  Resp:  [16-18] 16  BP: (102-118)/(71-82) 109/75  SpO2:  [95 %-100 %] 96 %  I/O last 3 completed shifts:  In: 2600 [P.O.:600; I.V.:2000]  Out: 3115 [Urine:2955; Drains:160]    Physical exam:   Blood pressure 121/74, pulse 84, temperature 99.8  F (37.7  C), temperature source Oral, resp. rate 18, height 1.803 m (5' 11\"), weight 73.9 kg (163 lb), SpO2 97%.  General: Well appearing, NAD  HEENT: atraumatic, normocephalic. Incision with central area of mild opening. Currently dry and intact.   PULM: Unlabored respirations  MSK: Appropriate range of motion  NEUROLOGIC:  -- Awake; Alert; oriented x 3  -- Follows commands briskly  -- Speech fluent, spontaneous. No aphasia or dysarthria.  -- no gaze preference. No apparent hemineglect.  Cranial Nerves:  -- visual fields full to confrontation, PERRL 3-2mm bilat and brisk, extraocular movements intact  -- Left eye is able to perceive light and shapes, left eye VA is 20/100 on bedside exam  -- face symmetrical, tongue midline  -- sensory V1-V3 intact bilaterally  -- palate elevates symmetrically, uvula midline  -- hearing grossly intact bilat  -- Trapezii 5/5 strength bilat symmetric  -- Cerebellar: Finger nose finger without dysmetria, intact rapid alternating motions bilaterally     Motor:  Normal bulk and tone; no tremor, rigidity, or bradykinesia.  No Pronator Drift    Delt Bi Tri Hand Flexion/  Extension Iliopsoas Quadriceps Hamstrings Tibialis Anterior Gastroc     C5 C6 C7 C8/T1 L2 L3 L4-S1 L4 S1   R 5 5 5 5 5 5 5 5 5   L 5 5 5 5 5 5 5 5 5      Sensory:  intact to LT x 4 extremities to light touch  Reflexes: no clonus, reflexes " 2+/4 in bicep, patella and ankle.  Gait: deferred     LABS:  Recent Labs   Lab 12/27/24  0732 12/26/24  1036 12/26/24  0613 12/26/24  0027 12/25/24  0649     --  134*  --  135   POTASSIUM 4.0  --  3.7  --  3.9   CHLORIDE 107  --  102  --  103   CO2 21*  --  21*  --  20*   ANIONGAP 10  --  11  --  12   * 97 95   < > 110*   BUN 11.2  --  10.6  --  10.9   CR 0.50*  --  0.66*  --  0.63*   BRAD 8.3*  --  8.4*  --  8.7*    < > = values in this interval not displayed.     Recent Labs   Lab 12/27/24  0732   WBC 7.4   RBC 4.04*   HGB 12.2*   HCT 35.7*   MCV 88   MCH 30.2   MCHC 34.2   RDW 13.0   *     IMAGING:  Recent Results (from the past 24 hours)   CT Head w/o Contrast    Narrative    CT HEAD W/O CONTRAST 12/26/2024 10:08 PM    History: s/p CSF leak repair   ICD-10:    Comparison: 12/25/2024 MRI    Technique: Using multidetector thin collimation helical acquisition  technique, axial, coronal and sagittal CT images from the skull base  to the vertex were obtained without intravenous contrast.   (topogram) image(s) also obtained and reviewed.    Findings: Postoperative changes of right lateral suboccipital  craniectomy and right frontal craniectomy. Stable extra-axial fluid  collection resection site. 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 Small amount of  pneumocephalus along the right tentorium. There is no intracranial  hemorrhage, mass effect, or midline shift. Gray/white matter  differentiation in both cerebral hemispheres is preserved. Ventricles  are proportionate to the cerebral sulci. The basal cisterns are clear.    The bony calvaria and the bones of the skull base are otherwise  normal. The visualized portions of the paranasal sinuses and mastoid  air cells are clear.      Impression    Impression:  1. Postoperative changes of right lateral suboccipital craniectomy and  right frontal craniectomy. Stable extra-axial fluid collection  at  resection site.  2. 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    I have personally reviewed the examination and initial interpretation  and I agree with the findings.    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 Fox on 12/26/2024. Doing well overall after surgery. Neurological exam is stable. No apparent leakage was observed from the new incision    Plan :  - Serial neuro exams  - Pain control  - HOB > 30 degrees  - Advance diet as tolerated  - Bowel regimen  - PRN antiemetics  - PT/OT   - Lumbar drain at 10 cc/hr   - Monitor incision    -----------------------------------  Theodore Spears MD, PGY-1  Department of Neurosurgery  Pager: [phone]    Please contact neurosurgery resident on call with questions.    Dial * * [phone], enter [phone] when prompted.   -----------------------------------

## 2024-12-28 NOTE — PROGRESS NOTES
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     He is now:     POD 2 from CSF leak repair and lumbar drain placement with Dr. Fox on 12/28/2024. Doing well overall after surgery. Neurological exam is stable. No apparent leakage was observed from the new incision.      Plan:  Continue LD 10/hr, will clamp in AM 12/29/2024  Serial neuro exams  Pain control  HOB > 30 degrees  Advance diet as tolerated  Bowel regimen  PRN antiemetics  IVF until taking adequate PO  PT/OT  SCDs for DVT proph, [facility]    Staff: Dr. Thomas/Ruddy    -----------------------------------  Silviano Ryder MD  Neurosurgery  Pager: [phone]  Please page/[email or web address] on-call neurosurgery with questions  -----------------------------------    Objective:   Temp:  [97.8  F (36.6  C)-98.3  F (36.8  C)] 98  F (36.7  C)  Pulse:  [55-66] 59  Resp:  [14-17] 16  BP: (107-126)/(74-85) 126/85  SpO2:  [95 %-100 %] 97 %  I/O last 3 completed shifts:  In: 1100 [P.O.:1100]  Out: 1855 [Urine:1625; Drains:230]    Gen: Appears comfortable, NAD  Incision: clean, dry, intact  Neurologic:  Alert & Oriented to person, place, time, and situation  Follows commands briskly  Speech fluent, spontaneous. No aphasia or dysarthria.  R VA is shapes/light only, R VA is 20/80  No gaze preference. No apparent hemineglect.  PERRL, EOMI  Face symmetric with sensation intact to light touch  Palate elevates symmetrically, uvula midline, tongue protrudes midline  Trapezii muscles 5/5 bilaterally  No pronator drift     Del Tr Bi WE WF Gr   R 5 5 5 5 5 5   L 5 5 5 5 5 5    HF KE KF DF PF EHL    R 5 5 5 5 5 5   L 5 5 5 5 5 5     Reflexes 2+ throughout  Sensation intact and symmetric to light touch throughout    LABS:  Recent Labs   Lab 12/27/24  0732 12/26/24  1036 12/26/24  0613 12/26/24  0027 12/25/24  0649     --  134*  --  135   POTASSIUM 4.0  --  3.7  --  3.9   CHLORIDE 107  --  102  --  103   CO2 21*  --  21*  --  20*   ANIONGAP 10  --  11  --  12   * 97 95   < > 110*   BUN 11.2  --  10.6  --  10.9   CR 0.50*  --  0.66*  --  0.63*   BRAD 8.3*  --  8.4*  --  8.7*    < > = values in this interval not displayed.     Recent Labs   Lab 12/27/24  0732   WBC 7.4   RBC 4.04*   HGB 12.2*   HCT 35.7*   MCV 88   MCH 30.2   MCHC 34.2   RDW 13.0   *       IMAGING:  No results found for this or any previous visit (from the past 24 hours).

## 2024-12-28 NOTE — PLAN OF CARE
"Status: Admitted d/t clear drainage from previous R petrous ride meningioma resection site. Found to be positive for Influenza A, placed on droplet precautions for 7 days. Hx of HTN, HLD, meningioma r/x in 2019 and 10/28/2024   Vitals: VSS ex MD xiomy aware  Neuros: A&Ox4, 5/5 t/o, denies n/t, baseline \"cloudy\" vision to R eye, [place]  IV: PIV x2 SL  Labs/electrolytes: calcium 7.7 this AM- MD aware  Resp/trach: mild cough, denies SOB, LSC  Diet: regular, good appetite  Bowel status: LBM 12/27  : self caths with assistance, last voided 650 at 1200  Skin: R posterior head incision and R ABD graft site JAMES, WNL. LD dressing CDI  Pain: denies pain  Activity: SBA, walked around halls x1 w/ wife  Social: wife at bedside, supportive  Plan/updates: continue draining 10ml/hr, plan to clamp 12/29 AM  "

## 2024-12-28 NOTE — OP NOTE
Date of surgery: 12/26/2024  Vinh Mccarty.  Male, 69 year old, 1955  MRN: [id number]    Preoperative diagnosis:  1.  Right lateral suboccipital postoperative CSF fistula    Postoperative diagnosis:  1.  Same    Title of operation:  1.  Repair of right lateral suboccipital CSF fistula  2.  Right retroauricular wound incision and debridement  3.  [place] of right lower abdominal autologous fat  4.  Lumbar drain placement  5.  Intraoperative use of microscope    Anesthesia: STEFFEN    Attending Surgeon: Matthew Fox MD    Resident surgeons:  1.  Malaika Powell MD  2.  Theodore Spears MD    History present illness: Mr. 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  He was recovering reasonably well but he has developed intermittent fullness under the right retroauricular incision and intermittent clear drainage.  He was recently admitted to the hospital for malaise and is recovering from influenza infection.  He does not have meningitis.  The right retroauricular incision continues to drain, and we have determined that wound revision and repair of the CSF fistula is indicated.  He presents for the above-stated procedures.    Description of operation: Upon intubation and induction of general anesthesia, the patient was placed in the lateral position with the right side up.  A gel roll was placed under the right axilla and his head was supported on a foam doughnut.  The lower back was prepared and draped in usual sterile fashion.  A timeout was performed.    A 14-gauge Touhy needle was advanced in the midline through the lower lumbar interspinous space until there was brisk return of CSF.  The lumbar drain catheter was then advanced through the needle and the needle was removed.  The catheter was then secured to the skin and  connected to a close drainage system.  However, there was no subsequent return from the drainage catheter.  We released the sutures and aspirated from the catheter with no return.  The catheter was removed and we repeated the above steps after advancing the 14-gauge Touhy needle at the adjacent superior interspinous space.  We obtained blood-tinged CSF return and we advanced the catheter over a wire without resistance.  The wire and needle were removed and there was continued return of CSF from the catheter.  The catheter was secured to the skin and connected to the close drainage system.  A sterile dressing was applied.  The patient was kept in the lateral position with the right side up.    The right retroauricular incision and right lower abdominal incision were identified.  The scalp and neck and abdomen were prepared and draped in usual sterile fashion.  Another timeout was performed.  The scalp incision was reopened using scalpel and monopolar cautery.  The dissection was carried down through the suboccipital fascia until the titanium cranioplasty mesh was identified.  The Synthes titanium screws were removed laterally and the mesh was then elevated and retracted laterally.  There was minimal autologous fat in the epidural space.  We identified the dural graft and the dural suture line.  There was a discrete defect in the superior third of the dural suture line with CSF return.  The operative microscope was brought into the field.    Under high magnification, we closed the dural defect with multiple interrupted 6-0 Prolene sutures.  The suture was reinforced with suboccipital fascial pledgets.  We applied a thin layer of DuraSeal to the suture line.    We reopened the right lower abdominal incision and dissected down to the subcutaneous fat.  We harvested approximately 10 cc of fat.  The fat was then applied to the dural suture line and an additional layer of DuraSeal was applied.  The Synthes titanium mesh  was then repositioned and secured laterally and superiorly with new Synthes titanium screws.  Both wounds were irrigated and bleeding points were secured.  The scalp was closed in 2 layers.  Prior to closing the skin, we perform subgaleal release to mobilize the medial aspect of the suboccipital scalp.  We then debrided the poorly viable skin edges in the middle third of the incision.  The skin was then closed without tension using 3-0 nylon.  We closed the abdominal incision in 2 layers with running 3-0 nylon for the skin.  Sterile dressings were applied to both wounds.  The patient was then returned to the supine position.  He was extubated and transported to the recovery room without incident.  Blood loss was approximately 50 cc.  Sponge and needle counts were correct at the end of the case.     I was present for the key portions and immediately available for the entire operation.    Matthew Fox MD  Department of Neurosurgery

## 2024-12-29 ENCOUNTER — APPOINTMENT (OUTPATIENT)
Dept: PHYSICAL THERAPY | Facility: CLINIC | Age: 69
End: 2024-12-29
Payer: MEDICARE

## 2024-12-29 LAB
ANION GAP SERPL CALCULATED.3IONS-SCNC: 9 MMOL/L (ref 7–15)
BUN SERPL-MCNC: 13.2 MG/DL (ref 8–23)
CALCIUM SERPL-MCNC: 8.7 MG/DL (ref 8.8–10.4)
CHLORIDE SERPL-SCNC: 109 MMOL/L (ref 98–107)
CREAT SERPL-MCNC: 0.52 MG/DL (ref 0.67–1.17)
EGFRCR SERPLBLD CKD-EPI 2021: >90 ML/MIN/1.73M2
ERYTHROCYTE [DISTWIDTH] IN BLOOD BY AUTOMATED COUNT: 13.2 % (ref 10–15)
GLUCOSE SERPL-MCNC: 94 MG/DL (ref 70–99)
HCO3 SERPL-SCNC: 22 MMOL/L (ref 22–29)
HCT VFR BLD AUTO: 37.3 % (ref 40–53)
HGB BLD-MCNC: 12.6 G/DL (ref 13.3–17.7)
MCH RBC QN AUTO: 29.7 PG (ref 26.5–33)
MCHC RBC AUTO-ENTMCNC: 33.8 G/DL (ref 31.5–36.5)
MCV RBC AUTO: 88 FL (ref 78–100)
PLATELET # BLD AUTO: 153 10E3/UL (ref 150–450)
POTASSIUM SERPL-SCNC: 3.7 MMOL/L (ref 3.4–5.3)
RBC # BLD AUTO: 4.24 10E6/UL (ref 4.4–5.9)
SODIUM SERPL-SCNC: 140 MMOL/L (ref 135–145)
WBC # BLD AUTO: 6.3 10E3/UL (ref 4–11)

## 2024-12-29 PROCEDURE — 250N000013 HC RX MED GY IP 250 OP 250 PS 637: Performed by: STUDENT IN AN ORGANIZED HEALTH CARE EDUCATION/TRAINING PROGRAM

## 2024-12-29 PROCEDURE — 250N000013 HC RX MED GY IP 250 OP 250 PS 637

## 2024-12-29 PROCEDURE — 82435 ASSAY OF BLOOD CHLORIDE: CPT

## 2024-12-29 PROCEDURE — 250N000013 HC RX MED GY IP 250 OP 250 PS 637: Performed by: NEUROLOGICAL SURGERY

## 2024-12-29 PROCEDURE — 80048 BASIC METABOLIC PNL TOTAL CA: CPT

## 2024-12-29 PROCEDURE — 36415 COLL VENOUS BLD VENIPUNCTURE: CPT

## 2024-12-29 PROCEDURE — 97116 GAIT TRAINING THERAPY: CPT | Mod: GP

## 2024-12-29 PROCEDURE — 120N000002 HC R&B MED SURG/OB UMMC

## 2024-12-29 PROCEDURE — 85014 HEMATOCRIT: CPT

## 2024-12-29 RX ORDER — LIDOCAINE HYDROCHLORIDE AND EPINEPHRINE 10; 10 MG/ML; UG/ML
5 INJECTION, SOLUTION INFILTRATION; PERINEURAL ONCE
Status: DISCONTINUED | OUTPATIENT
Start: 2024-12-29 | End: 2024-12-30 | Stop reason: HOSPADM

## 2024-12-29 RX ADMIN — PANTOPRAZOLE SODIUM 40 MG: 40 TABLET, DELAYED RELEASE ORAL at 08:24

## 2024-12-29 RX ADMIN — SENNOSIDES AND DOCUSATE SODIUM 1 TABLET: 8.6; 5 TABLET ORAL at 20:43

## 2024-12-29 RX ADMIN — LISINOPRIL 40 MG: 40 TABLET ORAL at 08:23

## 2024-12-29 RX ADMIN — CALCIUM 500 MG: 500 TABLET ORAL at 08:24

## 2024-12-29 RX ADMIN — AMLODIPINE BESYLATE 5 MG: 5 TABLET ORAL at 08:24

## 2024-12-29 RX ADMIN — PANTOPRAZOLE SODIUM 40 MG: 40 TABLET, DELAYED RELEASE ORAL at 15:43

## 2024-12-29 RX ADMIN — ACETAMINOPHEN 975 MG: 325 TABLET, FILM COATED ORAL at 11:36

## 2024-12-29 RX ADMIN — SENNOSIDES AND DOCUSATE SODIUM 1 TABLET: 8.6; 5 TABLET ORAL at 08:24

## 2024-12-29 RX ADMIN — CALCIUM 500 MG: 500 TABLET ORAL at 18:06

## 2024-12-29 RX ADMIN — ROSUVASTATIN CALCIUM 10 MG: 10 TABLET, FILM COATED ORAL at 08:23

## 2024-12-29 RX ADMIN — ACETAMINOPHEN 975 MG: 325 TABLET, FILM COATED ORAL at 04:02

## 2024-12-29 ASSESSMENT — ACTIVITIES OF DAILY LIVING (ADL)
ADLS_ACUITY_SCORE: 41

## 2024-12-29 NOTE — PROGRESS NOTES
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     He is now:     POD#3 from CSF leak repair and lumbar drain placement with Dr. Fox. Doing well overall after surgery. Neurological exam is stable. No apparent leakage was observed from the new incision.    Plan:  Lumbar drain clamped, SQH held, will remove tonight   Serial neuro exams  Pain control  HOB > 30 degrees  Advance diet as tolerated  Bowel regimen  PRN antiemetics  IVF until taking adequate PO  PT/OT  SCDs and SQH for DVT prophylaxis, on hold for LD removal     Staff: Dr. Thomas/Ruddy  -----------------------------------  Theodore Spears MD, PGY-1  Department of Neurosurgery  Pager: [phone]    Please contact neurosurgery resident on call with questions.    Dial * * [phone], enter [phone] when prompted.     -----------------------------------    Objective:   Temp:  [98  F (36.7  C)-99.1  F (37.3  C)] 98.5  F (36.9  C)  Pulse:  [61-70] 70  Resp:  [16-18] 16  BP: (113-128)/(75-87) 128/85  SpO2:  [96 %-98 %] 98 %  I/O last 3 completed shifts:  In: -   Out: 3415 [Urine:3175; Drains:240]    Gen: Appears comfortable, NAD  Incision: clean, dry, intact, no leak   Neurologic:  Alert & Oriented to person, place, time, and situation  Follows commands briskly  Speech fluent, spontaneous. No aphasia or dysarthria.  R VA is shapes/light only, R VA is 20/80  No gaze preference. No apparent hemineglect.  PERRL, EOMI  Face symmetric with sensation intact to light touch  Palate elevates symmetrically, uvula midline, tongue protrudes  midline  Trapezii muscles 5/5 bilaterally  No pronator drift     Del Tr Bi WE WF Gr   R 5 5 5 5 5 5   L 5 5 5 5 5 5    HF KE KF DF PF EHL   R 5 5 5 5 5 5   L 5 5 5 5 5 5     Reflexes 2+ throughout  Sensation intact and symmetric to light touch throughout    LABS:  Recent Labs   Lab 12/29/24  0708 12/28/24  0757 12/27/24  0732    140 138   POTASSIUM 3.7 3.4 4.0   CHLORIDE 109* 111* 107   CO2 22 20* 21*   ANIONGAP 9 9 10   GLC 94 87 135*   BUN 13.2 11.3 11.2   CR 0.52* 0.55* 0.50*   BRAD 8.7* 7.7* 8.3*     Recent Labs   Lab 12/29/24  0708   WBC 6.3   RBC 4.24*   HGB 12.6*   HCT 37.3*   MCV 88   MCH 29.7   MCHC 33.8   RDW 13.2          IMAGING:  No results found for this or any previous visit (from the past 24 hours).

## 2024-12-29 NOTE — PLAN OF CARE
"Goal Outcome Evaluation:      Plan of Care Reviewed With: patient    Overall Patient Progress: no changeOverall Patient Progress: no change     Status: Hx of petroclival meningioma resection with retrosigmoid crani with clear leakage from incision site for past 2 months intermittently. Now s/p CSF leak repair, abdominal fat graft, wound revision, and LD placement on 12/26  Vitals: VSS ex bradycardic   Neuros: A&Ox4, strengths 5/5 throughout, denies N/T, bilateral blurry vision, wears glasses, \"cloudy\" vision and decreased peripheral vision in R eye at baseline, [place]    IV: PIV SL   Labs/Electrolytes: WNL   Resp/trach: LSC on RA   Diet: Regular, good appetite   Bowel status: LBM 12/27, BS+  : Self straight caths with minimal assistance from staff.   Skin: R posterior neck/head incision JAMES with erythema and swelling, no drainage noted this shift, R abdominal fat graft site JAMES, LD site CDI    Pain: Denies   Activity: SBA   Social: Significant other at bedside during visiting hours  Plan/Updates this shift: LD clamped at 0700 this morning. Continue to monitor.       "

## 2024-12-29 NOTE — PLAN OF CARE
Physical Therapy Discharge Summary    Reason for therapy discharge:    All goals and outcomes met, no further needs identified.    Progress towards therapy goal(s). See goals on Care Plan in [facility] electronic health record for goal details.  Goals met    Therapy recommendation(s):    Continue home exercise program.

## 2024-12-29 NOTE — PLAN OF CARE
Status: Hx of petroclival meningioma resection with retrosigmoid crani with clear leakage from incision site for past 2 months intermittently. Now s/p CSF leak repair, abdominal fat graft, wound revision, and LD placement on 12/26   Vitals: VSS  Neuros: Intact, ex bilateral blurry/cloudy vision at baseline, wears glasses, [place]. Decreased peripheral vision to R eye at baseline.  IV: PIV SL  Labs/Electrolytes: WNL  Resp/trach: LS CTA, RA  Diet: Regular, good intake, needs assist to order d/t eyesight/wife able to order when she is here.  Bowel status: [facility] 12/28  : Voids w/ self straight cath, minimal assist from RN  Skin: R posterior neck/head incision JAMES w/ erythema/swelling, no drainage. R abd fat graft. LD c/d/I, LD clamped, no longer draining hourly.  Pain: Denies, occasional headache when coughing.  Activity: Up w/ sba, steady.  Social: Wife at bedside, involved w/ cares.  Plan: Continue current POC.  Updates this shift: LD clamped, plan to remove this evening then by flat bedrest for 3 hours. Patient planning to straight cath prior to LD removal.      Goal Outcome Evaluation:      Plan of Care Reviewed With: patient, spouse    Overall Patient Progress: improvingOverall Patient Progress: improving    Outcome Evaluation: LD clamped, plan to take out later in evening.

## 2024-12-29 NOTE — PLAN OF CARE
"Goal Outcome Evaluation:      Plan of Care Reviewed With: patient    Overall Patient Progress: improvingOverall Patient Progress: improving    Outcome Evaluation: LD at 10 mL/hr, plan to clamp LD tomorrow morning 12/29    Status: Hx of petroclival meningioma resection with retrosigmoid crani with clear leakage from incision site for past 2 months intermittently. Now s/p CSF leak repair, abdominal fat graft, wound revision, and LD placement on 12/26  Vitals: VSS ex bradycardic   Neuros: A&Ox4, strengths 5/5 throughout, denies N/T, bilateral blurry vision, wears glasses, \"cloudy\" vision and decreased peripheral vision in R eye at baseline, [place]    IV: PIV SL   Labs/Electrolytes: WNL   Resp/trach: LSC on RA   Diet: Regular, good appetite   Bowel status: LBM 12/27, BS+  : Self straight caths with minimal assistance from staff, last done at 2015 for 650 mL   Skin: R posterior neck/head incision JAMES with erythema and swelling, no drainage noted this shift, R abdominal fat graft site JAMES, LD site CDI    Pain: Denies   Activity: SBA   Social: Significant other visited this shift, supportive   Plan/Updates this shift: LD at 10 mL/hr, plan to clamp tomorrow morning, continue with POC.     "

## 2024-12-30 VITALS
BODY MASS INDEX: 22.82 KG/M2 | TEMPERATURE: 97.9 F | HEART RATE: 87 BPM | HEIGHT: 71 IN | RESPIRATION RATE: 15 BRPM | OXYGEN SATURATION: 95 % | WEIGHT: 163 LBS | SYSTOLIC BLOOD PRESSURE: 122 MMHG | DIASTOLIC BLOOD PRESSURE: 85 MMHG

## 2024-12-30 LAB
ANION GAP SERPL CALCULATED.3IONS-SCNC: 10 MMOL/L (ref 7–15)
BUN SERPL-MCNC: 13.1 MG/DL (ref 8–23)
CALCIUM SERPL-MCNC: 9.1 MG/DL (ref 8.8–10.4)
CHLORIDE SERPL-SCNC: 103 MMOL/L (ref 98–107)
CREAT SERPL-MCNC: 0.71 MG/DL (ref 0.67–1.17)
EGFRCR SERPLBLD CKD-EPI 2021: >90 ML/MIN/1.73M2
ERYTHROCYTE [DISTWIDTH] IN BLOOD BY AUTOMATED COUNT: 13.1 % (ref 10–15)
GLUCOSE SERPL-MCNC: 95 MG/DL (ref 70–99)
HCO3 SERPL-SCNC: 25 MMOL/L (ref 22–29)
HCT VFR BLD AUTO: 41.3 % (ref 40–53)
HGB BLD-MCNC: 14.3 G/DL (ref 13.3–17.7)
MCH RBC QN AUTO: 30.2 PG (ref 26.5–33)
MCHC RBC AUTO-ENTMCNC: 34.6 G/DL (ref 31.5–36.5)
MCV RBC AUTO: 87 FL (ref 78–100)
PLATELET # BLD AUTO: 178 10E3/UL (ref 150–450)
POTASSIUM SERPL-SCNC: 4 MMOL/L (ref 3.4–5.3)
RBC # BLD AUTO: 4.74 10E6/UL (ref 4.4–5.9)
SODIUM SERPL-SCNC: 138 MMOL/L (ref 135–145)
WBC # BLD AUTO: 5.3 10E3/UL (ref 4–11)

## 2024-12-30 PROCEDURE — 36415 COLL VENOUS BLD VENIPUNCTURE: CPT

## 2024-12-30 PROCEDURE — 250N000013 HC RX MED GY IP 250 OP 250 PS 637

## 2024-12-30 PROCEDURE — 250N000013 HC RX MED GY IP 250 OP 250 PS 637: Performed by: NEUROLOGICAL SURGERY

## 2024-12-30 PROCEDURE — 250N000013 HC RX MED GY IP 250 OP 250 PS 637: Performed by: STUDENT IN AN ORGANIZED HEALTH CARE EDUCATION/TRAINING PROGRAM

## 2024-12-30 PROCEDURE — 85018 HEMOGLOBIN: CPT

## 2024-12-30 PROCEDURE — 80048 BASIC METABOLIC PNL TOTAL CA: CPT

## 2024-12-30 PROCEDURE — 80051 ELECTROLYTE PANEL: CPT

## 2024-12-30 RX ORDER — AMOXICILLIN 250 MG
1 CAPSULE ORAL 2 TIMES DAILY
Qty: 60 TABLET | Refills: 0 | Status: SHIPPED | OUTPATIENT
Start: 2024-12-30

## 2024-12-30 RX ORDER — POLYETHYLENE GLYCOL 3350 17 G/17G
17 POWDER, FOR SOLUTION ORAL DAILY
Qty: 510 G | Refills: 0 | Status: SHIPPED | OUTPATIENT
Start: 2024-12-30

## 2024-12-30 RX ORDER — METHOCARBAMOL 500 MG/1
500 TABLET, FILM COATED ORAL EVERY 6 HOURS PRN
Qty: 28 TABLET | Refills: 0 | Status: SHIPPED | OUTPATIENT
Start: 2024-12-30 | End: 2025-01-13

## 2024-12-30 RX ADMIN — ACETAMINOPHEN 975 MG: 325 TABLET, FILM COATED ORAL at 12:34

## 2024-12-30 RX ADMIN — AMLODIPINE BESYLATE 5 MG: 5 TABLET ORAL at 08:28

## 2024-12-30 RX ADMIN — LISINOPRIL 40 MG: 40 TABLET ORAL at 08:29

## 2024-12-30 RX ADMIN — PANTOPRAZOLE SODIUM 40 MG: 40 TABLET, DELAYED RELEASE ORAL at 08:28

## 2024-12-30 RX ADMIN — ROSUVASTATIN CALCIUM 10 MG: 10 TABLET, FILM COATED ORAL at 08:28

## 2024-12-30 RX ADMIN — CALCIUM 500 MG: 500 TABLET ORAL at 08:28

## 2024-12-30 RX ADMIN — SENNOSIDES AND DOCUSATE SODIUM 1 TABLET: 8.6; 5 TABLET ORAL at 08:28

## 2024-12-30 ASSESSMENT — ACTIVITIES OF DAILY LIVING (ADL)
ADLS_ACUITY_SCORE: 41
ADLS_ACUITY_SCORE: 45
ADLS_ACUITY_SCORE: 45
ADLS_ACUITY_SCORE: 41

## 2024-12-30 NOTE — PLAN OF CARE
"Goal Outcome Evaluation:      Plan of Care Reviewed With: patient    Overall Patient Progress: no changeOverall Patient Progress: no change       Status: Hx of petroclival meningioma resection with retrosigmoid crani with clear leakage from incision site for past 2 months intermittently. Now s/p CSF leak repair, abdominal fat graft, wound revision, and LD placement on 12/26 and removed 12/29  Vitals: VSS ex bradycardic   Neuros: A&Ox4, strengths 5/5 throughout, denies N/T, bilateral blurry vision, wears glasses, \"cloudy\" vision and decreased peripheral vision in R eye at baseline, [facility]    IV: PIV SL   Labs/Electrolytes: WNL   Resp/trach: LSC on RA   Diet: Regular, good appetite   Bowel status: LBM 12/28, BS+  : Self straight caths with minimal assistance from staff.   Skin: R posterior neck/head incision JAMES with erythema and swelling, no drainage noted this shift, R abdominal fat graft site JAMES, old LD site CDI    Pain: Denies   Activity: SBA   Social: Significant other at bedside during visiting hours  Plan/Updates this shift: LD removed 12/29. Discharge home today    "

## 2024-12-30 NOTE — PROGRESS NOTES
Neurosurgery Brief Progress Note    Lumbar drain removal    Drain not deemed to be necessary given successful lumbar drain clamp trial. Drain site was prepped in usual sterile fashion with chloraprep. The drain was taken off suction. The sutures securing the drain were cut and the site was re-prepped. The drain was removed with tip intact. A single figure of 8 stitch with 4-0 monocryl was placed with adequate hemostasis. No immediate complication was observed and the patient tolerated the procedure well. Please reach out to the on-call resident for further questions.     Silviano Ryder MD  Neurosurgery  Pager: [phone]

## 2024-12-30 NOTE — PROGRESS NOTES
"Discharge    Patient discharged to home via wheel chair with transport staff via personal vehicle with spouse and son.   Pt is ambulatory, /85 (BP Location: Right arm, Patient Position: Left side, Cuff Size: Adult Regular)   Pulse 87   Temp 97.9  F (36.6  C) (Oral)   Resp 15   Ht 1.803 m (5' 11\")   Wt 73.9 kg (163 lb)   SpO2 95%   BMI 22.73 kg/m  remains on RA.     Listed belongings gathered and given to patient. Yes  Care Plan and Patient education resolved: Yes  Prescriptions if needed, hard copies sent with patient  NA  Medication Bin checked and emptied on discharge Yes        "

## 2024-12-30 NOTE — PLAN OF CARE
"Status: Hx of petroclival meningioma resection with retrosigmoid crani with clear leakage from incision site for past 2 months intermittently. Now s/p CSF leak repair, abdominal fat graft, wound revision, and LD placement on 12/26 and removed 12/29   Vitals: /85 (BP Location: Right arm, Patient Position: Left side, Cuff Size: Adult Regular)   Pulse 87   Temp 97.9  F (36.6  C) (Oral)   Resp 15   Ht 1.803 m (5' 11\")   Wt 73.9 kg (163 lb)   SpO2 95%   BMI 22.73 kg/m    Neuros: Alert and oriented x4.   IV: PIV taken out during the shift.  Labs/Electrolytes: See labs tab for updated results and values.   Resp: RA, denies SOB and chest pain.   Diet: Reg diet tolerated eating meals denies N/V.   GI/: Pt reported passing gas BS active no BM. Voiding spontaneously in restroom.   Skin: Skin intact no new  issue, midline lower back drain site CDI. Pt showered, and changed clothing.   Pain: Denies pain and discomfort effective with scheduled medications.   Activity: SBA/IND in room.   Social: Spouse at bedside and supportive.   Plan: Plan to discharge later today. Awaiting ride and final orders.    Intake/Output Summary (Last 24 hours) at 12/30/2024 1331  Last data filed at 12/30/2024 1015  Gross per 24 hour   Intake 433 ml   Output 1200 ml   Net -767 ml         Goal Outcome Evaluation: No Change       Plan of Care Reviewed With: patient    Overall Patient Progress: no change  "

## 2025-01-08 ENCOUNTER — TELEPHONE (OUTPATIENT)
Dept: NEUROSURGERY | Facility: CLINIC | Age: 70
End: 2025-01-08
Payer: COMMERCIAL

## 2025-01-08 ENCOUNTER — TELEPHONE (OUTPATIENT)
Dept: FAMILY MEDICINE | Facility: OTHER | Age: 70
End: 2025-01-08

## 2025-01-08 NOTE — TELEPHONE ENCOUNTER
Patients wife came to [facility] to schedule suture removal for this patient.  He had a procedure done in the [facility] and needs them removed 1/9.  I took a copy of the after care info.  Patient stated that the visit scheduled tomorrow 1/9 is virtual, I informed her the post op info says wound check and suture removal and that it is IN PERSON.  She was going to call them to straighten it out.  She stated they were not going but that patients PCP retired and his clinic wont take them out, that they were instructed to come to us to have this done.  I brought the info to management and was told that the patient will have to complete their post op visit tomorrow with the surgeon and the surgeons office will have to call the PCP clinic and give the warm hand off for suture removal to be done there.  I called the patient and explained this, he was frustrated as that clinic has already told him no since his PCP is not there any longer but stated he would let the surgeon know. I called the [facility] and relayed info to them as well.

## 2025-01-08 NOTE — TELEPHONE ENCOUNTER
Health Call Center    Phone Message    May a detailed message be left on voicemail: yes     Reason for Call: Other: Gianfranco GARCIA from [facility] called in regards to removing patients sutures and wound check. Patient is not coming to appointment on 1/9/2025 due to they don't drive that far and nobody to drive them to the [facility]. Patient is not sure how they are going to get the sutures removed. Please call Gianfranco back [phone].     Action Taken: Message routed to:  [facility] ([facility]     Travel Screening: Not Applicable     Date of Service:

## 2025-01-08 NOTE — TELEPHONE ENCOUNTER
Health Call Center    Phone Message    May a detailed message be left on voicemail: yes     Reason for Call: Other: Gianfranco GARCIA from [facility] called in regards to removing patients sutures and wound check. Patient is not coming to appointment on 1/9/2025 due to they don't drive that far and nobody to drive them to the [facility]. Patient is not sure how they are going to get the sutures removed. Please call Gianfranco back [phone].     Action Taken: Message routed to:  [facility] ([facility] [place] [facility]     Travel Screening: Not Applicable     Date of Service:

## 2025-01-08 NOTE — TELEPHONE ENCOUNTER
Outbound call to Gianfranco, phone number not working at this time.  Patient calling.  States was at [facility] ED on 12/24 for Draining post operative wound.  Discharged on 12/30/24.   Discharge Instructions and Follow-Up:      Discharge diet: Regular   Discharge activity: You may advance activity as tolerated. No strenuous exercise or heavy lifting greater than 10 lbs for 4 weeks or until seen and cleared in clinic.     Discharge follow-up: Follow-up with Neurosurgery DAVID in 2 weeks for wound check/post-hospital evaluation.  Okay to perform wound check at local PCP if unable to come to [facility]. Please notify neurosurgery if any drainage from incision      Wound care: Ok to shower,however no scrubbing of the wound and no soaking of the wound, meaning no bathtubs or swimming pools. Pat dry only. Leave wound open to air.     Patient to have wound checked 2 weeks following surgery.    Wound location: Right lower abdomen, right retroauricular region  Closure technique: Abdominal incision closed with nylon, retroauricular region incision closed with nylon  Dressing needs: Open to air  Post-op care at follow-up: Keep dry and clean.  Sutures must be removed in 2 weeks.     Patient stating wound is healing well, no drainage and would like to have wound check and suture removal near home in [facility].    Will check with Dr Fox and get back to [facility].  Patient canceled his appointment with JIMI Casper NP for 1/'9/25.  Patient states unable to get transportation to [facility], [place].

## 2025-01-08 NOTE — TELEPHONE ENCOUNTER
Per Dr Ruddy aguilera for suture removal and wound check near home.  Will call patient with information.

## 2025-01-09 NOTE — TELEPHONE ENCOUNTER
"Spoke with Vinh, explained \"Gianfranco\" at  Clinic below is not a working number, I have tried numerous times. Vinh asked me to call [facility] , returned call and got voice mail and numerous options. To set up appointment for suture removal.  I called and did not know which clinic to request.  Return call to Vinh again, they will need to find out what clinic he should call for appointment. I can then call and send FAX order and Discharge paperwork to them.    Vinh to work on what clinic he wants suture removal and callback.          "

## 2025-01-09 NOTE — TELEPHONE ENCOUNTER
Spoke with patient and Gianfranco on my Cell phone.  I was able to connect with Gianfranco at office on my cell.  I was passed along to JOSE Bennett Supervisor that will callback .  Gianfranco would not give me any information if the patient is accepted for suture removal and wound care.    Vinh called and made aware.  He also has a call into JOSE Bennett Supervisor who said she would call patient back.      Will verify again tomorrow morning with patient if any one near his area will remove sutures.

## 2025-01-28 ENCOUNTER — VIRTUAL VISIT (OUTPATIENT)
Dept: NEUROSURGERY | Facility: CLINIC | Age: 70
End: 2025-01-28
Payer: COMMERCIAL

## 2025-01-28 VITALS — HEIGHT: 71 IN | BODY MASS INDEX: 22.82 KG/M2 | WEIGHT: 163 LBS

## 2025-01-28 DIAGNOSIS — G97.82 POSTOPERATIVE CSF LEAK: Primary | ICD-10-CM

## 2025-01-28 DIAGNOSIS — G96.00 POSTOPERATIVE CSF LEAK: Primary | ICD-10-CM

## 2025-01-28 DIAGNOSIS — D32.0 CEREBRAL MENINGIOMA (H): ICD-10-CM

## 2025-01-28 PROCEDURE — 99024 POSTOP FOLLOW-UP VISIT: CPT | Mod: 93 | Performed by: NEUROLOGICAL SURGERY

## 2025-01-28 ASSESSMENT — PAIN SCALES - GENERAL: PAINLEVEL_OUTOF10: NO PAIN (0)

## 2025-01-28 NOTE — LETTER
1/28/2025       RE: Vinh Mccarty  [address]  [facility] [address]     Dear Colleague,    Thank you for referring your patient, Vinh Mccarty, to the [facility]IC [place] [facility]. Please see a copy of my visit note below.    Virtual Visit Details  Date of service: 1/28/2025  Type of service:  Telephone Visit   Phone call duration: 15 minutes   Originating Location (pt. Location): Home    Distant Location (provider location):  On-site  Telephone visit completed due to the patient did not have access to video, while the distant provider did.    We spoke to Mr. Mccarty as part of a telephone follow-up in cranial neurosurgery clinic today.  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  He had his sutures removed recently in the emergency department.  There was a photograph in the chart, and the right retroauricular wound appeared intact and healing well.    In general, he is feeling well.  He is having some intermittent frontal headaches he has had previously and attributes to sinus congestion.  He lost a fair amount of weight during the recovery from the 2 surgeries.  He has regained about 8 pounds.  The other main problem he is having is urinary retention.  The indwelling urinary catheter was removed.  He also completed a course of antibiotics for a UTI.  He is now able to void voluntarily but he is straight catheterizing after voiding to check residuals.  He has a follow-up with Dr. Harris in the urology clinic in the near future.  He will be establishing care with a new primary provider soon.    Over the phone, he sounded well.  His speech, language, and phonation were normal.    There were no new images to review.  His last MRI was in December 2024, and this showed no obvious residual tumor at either surgical  site (right anterior clinoid and right petrous ridge).    He can gradually increase his activity levels starting in mid February.  He is eager to do some outdoor work including chopping wood.  We will repeat an MRI of the brain with contrast towards the end of this calendar year to check for any tumor recurrence.    Matthew Fox MD  Department of Neurosurgery        Again, thank you for allowing me to participate in the care of your patient.      Sincerely,    Matthew Fox MD

## 2025-01-28 NOTE — NURSING NOTE
Current patient location: [address] [place] [address] [address]  [facility] [address]    Is the patient currently in the state of [place]? YES    Visit mode: TELEPHONE    If the visit is dropped, the patient can be reconnected by:TELEPHONE VISIT: Phone number: [phone]    Will anyone else be joining the visit? NO  (If patient encounters technical issues they should call [phone]    Are changes needed to the allergy or medication list? No    Are refills needed on medications prescribed by this physician? NO    Rooming Documentation:  Questionnaire(s) not pre-assigned    Reason for visit: Follow Up    Miriam VALENCIA

## 2025-01-28 NOTE — PROGRESS NOTES
Virtual Visit Details  Date of service: 1/28/2025  Type of service:  Telephone Visit   Phone call duration: 15 minutes   Originating Location (pt. Location): Home    Distant Location (provider location):  On-site  Telephone visit completed due to the patient did not have access to video, while the distant provider did.    We spoke to Mr. Mccarty as part of a telephone follow-up in cranial neurosurgery clinic today.  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  He had his sutures removed recently in the emergency department.  There was a photograph in the chart, and the right retroauricular wound appeared intact and healing well.    In general, he is feeling well.  He is having some intermittent frontal headaches he has had previously and attributes to sinus congestion.  He lost a fair amount of weight during the recovery from the 2 surgeries.  He has regained about 8 pounds.  The other main problem he is having is urinary retention.  The indwelling urinary catheter was removed.  He also completed a course of antibiotics for a UTI.  He is now able to void voluntarily but he is straight catheterizing after voiding to check residuals.  He has a follow-up with Dr. Harris in the urology clinic in the near future.  He will be establishing care with a new primary provider soon.    Over the phone, he sounded well.  His speech, language, and phonation were normal.    There were no new images to review.  His last MRI was in December 2024, and this showed no obvious residual tumor at either surgical site (right anterior clinoid and right petrous ridge).    He can gradually increase his activity levels starting in mid February.  He is eager to do some outdoor work including chopping wood.  We will repeat an MRI of the brain with contrast towards the end of this calendar year to check for any tumor recurrence.    Matthew  MD Ruddy  Department of Neurosurgery

## 2025-01-28 NOTE — PATIENT INSTRUCTIONS
You may start increasing your physical activity around February 20, 2025    Repeat MRI brain with contrast in December 2025 at [facility]    Follow up with urology as scheduled

## (undated) DEVICE — BUR STRK CARBIDE ROUND 5.0MM 5820-110-050C

## (undated) DEVICE — PIN SKULL MAYFIELD ADULT TITANIUM 3/PK A1120

## (undated) DEVICE — PAD CHUX UNDERPAD 23X24" 7136

## (undated) DEVICE — SOL NACL 0.9% IRRIG 1000ML BOTTLE 2F7124

## (undated) DEVICE — BLADE KNIFE BEAVER MICROSHARP GREEN 377515

## (undated) DEVICE — DRSG PRIMAPORE 03 1/8X6" 66000318

## (undated) DEVICE — DECANTER VIAL 2006S

## (undated) DEVICE — TUBING ANSPACH GI IRRIG SGL PACK 05.001.178.01S

## (undated) DEVICE — TUBING IRR CLIP FOR SHORT ATTACH ANSPACH IRR-CLIP-10

## (undated) DEVICE — DRAPE MICROSCOPE LEICA 54X120" 09-MK653

## (undated) DEVICE — PREP CHLORAPREP BD CLEAR 1ML 930480

## (undated) DEVICE — SLEEVE IRRIGATION MIS 13.0CM 5/PKG 5407-010-950

## (undated) DEVICE — DRSG GAUZE 4X4" TRAY 6939

## (undated) DEVICE — SOL WATER IRRIG 1000ML BOTTLE 2F7114

## (undated) DEVICE — PREP CHLORAPREP CLEAR 3ML 930400

## (undated) DEVICE — SPONGE COTTONOID 1/2X1 1/2" 20-06S

## (undated) DEVICE — DECANTER BAG 2002S

## (undated) DEVICE — SOL RINGERS LACTATED 1000ML BAG 07953-09

## (undated) DEVICE — IOM SUPPLIES/CASE FEE

## (undated) DEVICE — SUCTION MANIFOLD DORNOCH ULTRA CART UL-CL500

## (undated) DEVICE — BONE WAX 2.5GM W31G

## (undated) DEVICE — ESU CORD BIPOLAR AND IRR TUBING AESCULAP US355

## (undated) DEVICE — PERFORATOR CRANIAL DGR-O SURGICAL 11-14MM PEDS 200-271

## (undated) DEVICE — SU PROLENE 6-0 RB-2DA 30" 8711H

## (undated) DEVICE — LINEN TOWEL PACK X30 5481

## (undated) DEVICE — SU NUROLON 4-0 TF CR 8X18" C584D

## (undated) DEVICE — RX VISTASEAL FIBRIN SEALANT W/THROMBIN 10ML VST10

## (undated) DEVICE — RETR ELASTIC STAYS LONE STAR BLUNT DUAL LEAD 3550-1G

## (undated) DEVICE — ESU CORD BIPOLAR GREEN 10-4000

## (undated) DEVICE — SU VICRYL 3-0 SH 8X18" UND J864D

## (undated) DEVICE — WIPES FOLEY CARE SURESTEP PROVON DFC100

## (undated) DEVICE — DRAPE IOBAN INCISE 23X17" 6650EZ

## (undated) DEVICE — SYR 10ML LL W/O NDL 302995

## (undated) DEVICE — SPONGE COTTONOID 1/2X3" 20-07S

## (undated) DEVICE — SU VICRYL 2-0 CT-2 CR 8X18" J726D

## (undated) DEVICE — LINEN TOWEL PACK X5 5464

## (undated) DEVICE — SOL RINGERS LACTATED 500ML BAG 2B2323QA

## (undated) DEVICE — SPONGE SURGIFOAM 01GM POWDER 1978

## (undated) DEVICE — DRAPE SHEET REV FOLD 3/4 9349

## (undated) DEVICE — BUR BALL 3MM CARBIDE EXT LENGTH ANSPACH S-3B-C-G1

## (undated) DEVICE — CRANIOTOME ADULT ANSPACH A-CRN

## (undated) DEVICE — DRAPE POUCH INSTRUMENT 1018

## (undated) DEVICE — LABEL MEDICATION SYSTEM 3303-P

## (undated) DEVICE — ESU GROUND PAD ADULT W/CORD E7507

## (undated) DEVICE — SUCTION MANIFOLD NEPTUNE 2 SYS 4 PORT 0702-020-000

## (undated) DEVICE — RX BACITRACIN OINTMENT 0.9G 1/32OZ CUR001109

## (undated) DEVICE — SPONGE COTTONOID 1/2X1/2" 20-04S

## (undated) DEVICE — SPONGE COTTONOID 1X3" 20-10S

## (undated) DEVICE — PREP CHLORAPREP CLEAR 3ML 260400

## (undated) DEVICE — NDL ANGIOCATH 14GA 1.25" 4048

## (undated) DEVICE — LINEN TOWEL PACK X6 WHITE 5487

## (undated) DEVICE — DRAPE MAYO STAND 23X54 8337

## (undated) DEVICE — DRSG STERI STRIP 1/2X4" R1547

## (undated) DEVICE — SURGICEL HEMOSTAT 4X8" 1952

## (undated) DEVICE — PREP POVIDONE-IODINE 10% SOLUTION 4OZ BOTTLE MDS093944

## (undated) DEVICE — BUR STRK ROUND DIAMOND 2.0MM COARSE 8450-012-020DC

## (undated) DEVICE — DRSG PRIMAPORE 02X3" 7133

## (undated) DEVICE — DRAPE SHEET MED 44X70" 9355

## (undated) DEVICE — DRAPE POUCH IRR 1016

## (undated) DEVICE — MARKER SPHERES PASSIVE MEDT PACK 5 8801075

## (undated) DEVICE — TIP NAVIGATOR STR MICRO SONOPET 25KHZ 5450-800-309

## (undated) DEVICE — BUR BALL 3MM DIAMOND COARSE EXT ANSPACH S-3DC-G1

## (undated) DEVICE — ADH LIQUID MASTISOL TOPICAL VIAL 2-3ML 0523-48

## (undated) DEVICE — PREP POVIDONE IODINE SCRUB 7.5% 4OZ APL82212

## (undated) DEVICE — ESU FCP CODMAN 9"X1.0MM VERSATRU 9009100SL

## (undated) DEVICE — TUBING STRYKER IRRIGATION CASSETTE 5400-050-001

## (undated) DEVICE — SU MONOCRYL 3-0 PS-1 27" Y936H

## (undated) DEVICE — ESU ELEC BLADE 2.75" COATED/INSULATED E1455

## (undated) DEVICE — BUR BALL 2MM DIAMOND COARSE EXT ANSPACH S-2DC-G1

## (undated) DEVICE — PREP POVIDONE-IODINE 7.5% SCRUB 4OZ BOTTLE MDS093945

## (undated) DEVICE — SYR 30ML LL W/O NDL 302832

## (undated) DEVICE — CATH TRAY FOLEY SURESTEP 16FR W/TMP PRB STLK LATEX A319416AM

## (undated) DEVICE — SPONGE COTTONOID 1/4X1/4" 20-01S

## (undated) DEVICE — RX SURGIFLO HEMOSTATIC MATRIX W/THROMBIN 8ML 2994

## (undated) DEVICE — NDL BLUNT 18GA 1" W/O FILTER 305181

## (undated) DEVICE — SPONGE SURGIFOAM 12 1972

## (undated) DEVICE — PREP SKIN SCRUB TRAY 4461A

## (undated) DEVICE — PACK CRANIOTOMY

## (undated) DEVICE — PERFORATOR 14MM CODMAN

## (undated) DEVICE — CATH LUMBAR 60CM 46419

## (undated) DEVICE — SU ETHILON 3-0 PS-1 18" 1663H

## (undated) DEVICE — GLOVE BIOGEL PI MICRO SZ 7.0 48570

## (undated) DEVICE — DRAPE CORETEMP FLUID WARM SYSTEM 62X56IN CTD200

## (undated) DEVICE — SYR 10ML FINGER CONTROL W/O NDL 309695

## (undated) DEVICE — TUBING SET ASPIRATION W/EXT SONOPET  LF 5450-850-003

## (undated) DEVICE — PACK NEURO MINOR UMMC SNE32MNMU4

## (undated) DEVICE — DRAPE CRANIOTOMY W/POUCH 9450

## (undated) DEVICE — BUR ROUTER 1.4X12.8MM ANSPACH S-1R

## (undated) DEVICE — ESU HOLSTER PLASTIC DISP E2400

## (undated) DEVICE — SYR BULB IRRIG DOVER 60 ML LATEX FREE 67000

## (undated) DEVICE — DRAPE MICROSCOPE LEICA 54X150" AR8033650

## (undated) DEVICE — CLIPS HOSE ANSPACH G1 HOSECLIPS-GI

## (undated) DEVICE — PREP POVIDONE IODINE SOLUTION 10% 4OZ

## (undated) DEVICE — BUR STRK 2.3MM TAPERED ROUTER - FA2 5407-FA2-023

## (undated) DEVICE — STRAP UNIVERSAL POSITIONING 2-PIECE 4X47X76" 91-287

## (undated) DEVICE — PREP CHLORAPREP 26ML TINTED HI-LITE ORANGE 930815

## (undated) DEVICE — SPONGE SURGIFOAM 100 1974

## (undated) DEVICE — SYR 20ML LL W/O NDL 302830

## (undated) DEVICE — DRAPE EYE SHEET 8441

## (undated) RX ORDER — DEXAMETHASONE SODIUM PHOSPHATE 4 MG/ML
INJECTION, SOLUTION INTRA-ARTICULAR; INTRALESIONAL; INTRAMUSCULAR; INTRAVENOUS; SOFT TISSUE
Status: DISPENSED
Start: 2024-10-28

## (undated) RX ORDER — CLINDAMYCIN PHOSPHATE 900 MG/50ML
INJECTION, SOLUTION INTRAVENOUS
Status: DISPENSED
Start: 2024-10-28

## (undated) RX ORDER — PROPOFOL 10 MG/ML
INJECTION, EMULSION INTRAVENOUS
Status: DISPENSED
Start: 2024-12-26

## (undated) RX ORDER — ONDANSETRON 2 MG/ML
INJECTION INTRAMUSCULAR; INTRAVENOUS
Status: DISPENSED
Start: 2024-12-26

## (undated) RX ORDER — FENTANYL CITRATE 50 UG/ML
INJECTION, SOLUTION INTRAMUSCULAR; INTRAVENOUS
Status: DISPENSED
Start: 2019-07-25

## (undated) RX ORDER — CEFAZOLIN SODIUM/WATER 2 G/20 ML
SYRINGE (ML) INTRAVENOUS
Status: DISPENSED
Start: 2024-12-26

## (undated) RX ORDER — HYDROMORPHONE HYDROCHLORIDE 1 MG/ML
INJECTION, SOLUTION INTRAMUSCULAR; INTRAVENOUS; SUBCUTANEOUS
Status: DISPENSED
Start: 2019-07-25

## (undated) RX ORDER — PAPAVERINE HYDROCHLORIDE 30 MG/ML
INJECTION INTRAMUSCULAR; INTRAVENOUS
Status: DISPENSED
Start: 2024-10-28

## (undated) RX ORDER — SODIUM CHLORIDE 9 MG/ML
INJECTION, SOLUTION INTRAVENOUS
Status: DISPENSED
Start: 2019-07-25

## (undated) RX ORDER — FENTANYL CITRATE 50 UG/ML
INJECTION, SOLUTION INTRAMUSCULAR; INTRAVENOUS
Status: DISPENSED
Start: 2024-12-26

## (undated) RX ORDER — ACETAMINOPHEN 325 MG/1
TABLET ORAL
Status: DISPENSED
Start: 2019-07-25

## (undated) RX ORDER — APREPITANT 40 MG/1
CAPSULE ORAL
Status: DISPENSED
Start: 2024-12-26

## (undated) RX ORDER — IOPAMIDOL 510 MG/ML
INJECTION, SOLUTION INTRAVASCULAR
Status: DISPENSED
Start: 2024-10-28

## (undated) RX ORDER — FENTANYL CITRATE 50 UG/ML
INJECTION, SOLUTION INTRAMUSCULAR; INTRAVENOUS
Status: DISPENSED
Start: 2024-10-28

## (undated) RX ORDER — ACETAMINOPHEN 325 MG/1
TABLET ORAL
Status: DISPENSED
Start: 2024-10-28

## (undated) RX ORDER — FENTANYL CITRATE-0.9 % NACL/PF 10 MCG/ML
PLASTIC BAG, INJECTION (ML) INTRAVENOUS
Status: DISPENSED
Start: 2024-10-28

## (undated) RX ORDER — FENTANYL CITRATE-0.9 % NACL/PF 10 MCG/ML
PLASTIC BAG, INJECTION (ML) INTRAVENOUS
Status: DISPENSED
Start: 2024-12-26

## (undated) RX ORDER — HYDROMORPHONE HCL IN WATER/PF 6 MG/30 ML
PATIENT CONTROLLED ANALGESIA SYRINGE INTRAVENOUS
Status: DISPENSED
Start: 2024-10-28

## (undated) RX ORDER — MEPERIDINE HYDROCHLORIDE 25 MG/ML
INJECTION INTRAMUSCULAR; INTRAVENOUS; SUBCUTANEOUS
Status: DISPENSED
Start: 2024-10-28

## (undated) RX ORDER — BACITRACIN 50000 [IU]/1
INJECTION, POWDER, FOR SOLUTION INTRAMUSCULAR
Status: DISPENSED
Start: 2019-07-25

## (undated) RX ORDER — DEXAMETHASONE SODIUM PHOSPHATE 4 MG/ML
INJECTION, SOLUTION INTRA-ARTICULAR; INTRALESIONAL; INTRAMUSCULAR; INTRAVENOUS; SOFT TISSUE
Status: DISPENSED
Start: 2024-12-26

## (undated) RX ORDER — BUPIVACAINE HYDROCHLORIDE AND EPINEPHRINE 2.5; 5 MG/ML; UG/ML
INJECTION, SOLUTION EPIDURAL; INFILTRATION; INTRACAUDAL; PERINEURAL
Status: DISPENSED
Start: 2024-10-28

## (undated) RX ORDER — ONDANSETRON 2 MG/ML
INJECTION INTRAMUSCULAR; INTRAVENOUS
Status: DISPENSED
Start: 2024-10-28

## (undated) RX ORDER — HYDROMORPHONE HYDROCHLORIDE 1 MG/ML
INJECTION, SOLUTION INTRAMUSCULAR; INTRAVENOUS; SUBCUTANEOUS
Status: DISPENSED
Start: 2024-12-26

## (undated) RX ORDER — CLINDAMYCIN PHOSPHATE 900 MG/50ML
INJECTION, SOLUTION INTRAVENOUS
Status: DISPENSED
Start: 2019-07-25

## (undated) RX ORDER — SODIUM CHLORIDE 9 MG/ML
INJECTION, SOLUTION INTRAVENOUS
Status: DISPENSED
Start: 2024-10-28

## (undated) RX ORDER — EPHEDRINE SULFATE 50 MG/ML
INJECTION, SOLUTION INTRAMUSCULAR; INTRAVENOUS; SUBCUTANEOUS
Status: DISPENSED
Start: 2024-12-26

## (undated) RX ORDER — PROPOFOL 10 MG/ML
INJECTION, EMULSION INTRAVENOUS
Status: DISPENSED
Start: 2024-10-28

## (undated) RX ORDER — BUPIVACAINE HYDROCHLORIDE AND EPINEPHRINE 5; 5 MG/ML; UG/ML
INJECTION, SOLUTION EPIDURAL; INTRACAUDAL; PERINEURAL
Status: DISPENSED
Start: 2019-07-25